# Patient Record
Sex: FEMALE | Race: WHITE | NOT HISPANIC OR LATINO | Employment: UNEMPLOYED | URBAN - METROPOLITAN AREA
[De-identification: names, ages, dates, MRNs, and addresses within clinical notes are randomized per-mention and may not be internally consistent; named-entity substitution may affect disease eponyms.]

---

## 2018-03-01 ENCOUNTER — AMBULATORY CONVERSION ENCOUNTER (OUTPATIENT)
Dept: FAMILY MEDICINE | Facility: CLINIC | Age: 53
End: 2018-03-01

## 2018-03-02 ENCOUNTER — AMBULATORY CONVERSION ENCOUNTER (OUTPATIENT)
Dept: FAMILY MEDICINE | Facility: CLINIC | Age: 53
End: 2018-03-02

## 2018-06-18 RX ORDER — PANTOPRAZOLE SODIUM 40 MG/1
TABLET, DELAYED RELEASE ORAL
Qty: 30 TABLET | Refills: 3 | Status: SHIPPED | OUTPATIENT
Start: 2018-06-18 | End: 2018-10-29 | Stop reason: SDUPTHER

## 2018-06-22 RX ORDER — POTASSIUM CHLORIDE 750 MG/1
10 TABLET, EXTENDED RELEASE ORAL DAILY
Qty: 90 TABLET | Refills: 1 | Status: SHIPPED | OUTPATIENT
Start: 2018-06-22 | End: 2018-12-14 | Stop reason: SDUPTHER

## 2018-06-22 RX ORDER — POTASSIUM CHLORIDE 750 MG/1
10 TABLET, EXTENDED RELEASE ORAL
COMMUNITY
Start: 2017-08-02 | End: 2018-06-22 | Stop reason: SDUPTHER

## 2018-07-09 ENCOUNTER — OFFICE VISIT (OUTPATIENT)
Dept: INTERNAL MEDICINE | Facility: CLINIC | Age: 53
End: 2018-07-09
Payer: MEDICARE

## 2018-07-09 VITALS
SYSTOLIC BLOOD PRESSURE: 100 MMHG | HEART RATE: 76 BPM | HEIGHT: 58 IN | OXYGEN SATURATION: 96 % | RESPIRATION RATE: 18 BRPM | WEIGHT: 111 LBS | TEMPERATURE: 98.3 F | DIASTOLIC BLOOD PRESSURE: 60 MMHG | BODY MASS INDEX: 23.3 KG/M2

## 2018-07-09 DIAGNOSIS — G62.9 NEUROPATHY: ICD-10-CM

## 2018-07-09 DIAGNOSIS — E78.5 HYPERLIPIDEMIA, UNSPECIFIED HYPERLIPIDEMIA TYPE: Primary | ICD-10-CM

## 2018-07-09 DIAGNOSIS — R73.9 HYPERGLYCEMIA: ICD-10-CM

## 2018-07-09 DIAGNOSIS — K21.9 GASTROESOPHAGEAL REFLUX DISEASE, ESOPHAGITIS PRESENCE NOT SPECIFIED: ICD-10-CM

## 2018-07-09 DIAGNOSIS — E55.9 VITAMIN D DEFICIENCY: ICD-10-CM

## 2018-07-09 PROCEDURE — 99214 OFFICE O/P EST MOD 30 MIN: CPT | Performed by: INTERNAL MEDICINE

## 2018-07-09 RX ORDER — GABAPENTIN 300 MG/1
900 CAPSULE ORAL 3 TIMES DAILY
COMMUNITY
Start: 2013-03-25

## 2018-07-09 RX ORDER — FLUTICASONE PROPIONATE 50 MCG
SPRAY, SUSPENSION (ML) NASAL
COMMUNITY
Start: 2017-04-03 | End: 2018-10-10 | Stop reason: SDUPTHER

## 2018-07-09 RX ORDER — VIT C/E/ZN/COPPR/LUTEIN/ZEAXAN 250MG-90MG
1000 CAPSULE ORAL DAILY
COMMUNITY
Start: 2017-12-26

## 2018-07-09 RX ORDER — HYDROCHLOROTHIAZIDE 12.5 MG/1
12.5 CAPSULE ORAL DAILY
COMMUNITY
Start: 2006-12-20 | End: 2019-02-04 | Stop reason: SDUPTHER

## 2018-07-09 RX ORDER — CETIRIZINE HYDROCHLORIDE 10 MG/1
1 TABLET ORAL DAILY
COMMUNITY
End: 2023-10-03

## 2018-07-09 RX ORDER — MULTIVIT WITH MINERALS/HERBS
400 TABLET ORAL DAILY
COMMUNITY
Start: 2018-01-02 | End: 2023-05-12

## 2018-07-09 RX ORDER — VALACYCLOVIR HYDROCHLORIDE 500 MG/1
500 TABLET, FILM COATED ORAL
COMMUNITY
Start: 2018-03-29 | End: 2019-02-04

## 2018-07-09 RX ORDER — MELOXICAM 7.5 MG/1
7.5 TABLET ORAL AS NEEDED
COMMUNITY
Start: 2018-01-02 | End: 2023-10-03

## 2018-07-09 ASSESSMENT — ENCOUNTER SYMPTOMS
CHILLS: 0
ABDOMINAL PAIN: 0
SORE THROAT: 0
BACK PAIN: 1
DIZZINESS: 0
NAUSEA: 0
COUGH: 0
EYE PAIN: 0
FATIGUE: 0
BLOOD IN STOOL: 0
NECK PAIN: 1
FEVER: 0
HEMATURIA: 0
VOMITING: 0
BRUISES/BLEEDS EASILY: 0
PALPITATIONS: 0
NECK STIFFNESS: 1
DYSURIA: 0
SHORTNESS OF BREATH: 0
HEADACHES: 0
NUMBNESS: 0
DIARRHEA: 0
CONFUSION: 0

## 2018-07-09 NOTE — PATIENT INSTRUCTIONS
PLAN      Get blood work in 2 months  Watch the diet and walk regularly  Follow up with Dr sumner  See me in 6  months

## 2018-07-09 NOTE — PROGRESS NOTES
Subjective      Patient ID: Rajwinder Wynne is a 52 y.o. female     HPI     Here for follow up on chronic conditions  Has been stressed as her father passed away from pulmonary fibrosis  She is staying with her mother and is still grieving  Has not been compliant with diet   Did not get blood work done  She saw her gyn dr sumner and will start HRT    Past Medical History:   Diagnosis Date   • Allergic rhinitis    • Cervical myelopathy (CMS/HCC) (HCC)    • Cervical radiculopathy    • Cervicogenic headache    • Cricopharyngeal dysphagia    • Food allergy    • GERD (gastroesophageal reflux disease)    • Lipid disorder    • Nephrolithiasis    • Osteoporosis        Past Surgical History:   Procedure Laterality Date   • ANTERIOR FUSION CERVICAL SPINE      c6-7   • CRICOPHARYNGEAL MYOTOMY     • LUMBAR FUSION      L5-S1   • MYOMECTOMY         Family History   Problem Relation Age of Onset   • Hyperlipidemia Mother    • Hypertension Mother    • Pulmonary fibrosis Father    • Colon cancer Maternal Grandfather    • Breast cancer Mother's Sister    • Ovarian cancer Father's Sister        Social History     Social History   • Marital status:      Spouse name: N/A   • Number of children: N/A   • Years of education: N/A     Occupational History   • Not on file.     Social History Main Topics   • Smoking status: Never Smoker   • Smokeless tobacco: Never Used   • Alcohol use Not on file   • Drug use: Unknown   • Sexual activity: Not on file     Other Topics Concern   • Not on file     Social History Narrative   • No narrative on file       Allergies   Allergen Reactions   • Shellfish Containing Products      Pumpkin seeds   • Adhesive      Steri strips   • Banana      also allergic to almonds, berries   • Erythromycin    • Erythromycin Base      Other reaction(s): GI side effects   • Iodine    • Levonorgestrel-Ethinyl Estrad    • Naprelan    • Naproxen    • Naproxen Sodium      Other reaction(s): lump in throat   • Penicillin  "G    • Penicillin V Potassium Hives   • Pumpkin Seed      Other reaction(s): throat gets tight   • Shellfish Derived Hives       Current Outpatient Prescriptions   Medication Sig Dispense Refill   • b complex vitamins tablet Take 400 mg by mouth daily.     • cetirizine (ZyrTEC) 10 mg tablet Take 1 tablet by mouth daily.     • cholecalciferol, vitamin D3, (VITAMIN D3) 1,000 unit capsule take 1 tablet by oral route  every day     • fluticasone (FLONASE) 50 mcg/actuation nasal spray SPRAY 2 BY NASAL ROUTE EVERY DAY IN EACH NOSTRIL     • gabapentin (NEURONTIN) 300 mg capsule Take 300 mg by mouth 3 (three) times a day.     • hydrochlorothiazide (MICROZIDE) 12.5 mg capsule Take 12.5 mg by mouth daily.     • meloxicam (MOBIC) 7.5 mg tablet Take 7.5 mg by mouth daily.     • mv-min-FA-Qg-Bq-gpgtqze-lutein 0.4-162-18 mg tablet Take 1 tablet by mouth daily.     • pantoprazole (PROTONIX) 40 mg EC tablet TAKE 1 TABLET (40MG) BY ORAL ROUTE EVERY DAY 30 tablet 3   • potassium chloride (KLOR-CON 10) 10 mEq CR tablet Take 1 tablet (10 mEq total) by mouth daily. 90 tablet 1   • valACYclovir (VALTREX) 500 mg tablet Take 500 mg by mouth.       No current facility-administered medications for this visit.        /60   Pulse 76   Temp 36.8 °C (98.3 °F) (Oral)   Resp 18   Ht 1.461 m (4' 9.5\")   Wt 50.3 kg (111 lb)   SpO2 96%   BMI 23.60 kg/m²       The following have been reviewed and updated as appropriate in this visit:  Allergies  Meds  Problems       Review of Systems   Constitutional: Negative for chills, fatigue and fever.   HENT: Negative for ear pain and sore throat.    Eyes: Negative for pain and visual disturbance.   Respiratory: Negative for cough and shortness of breath.    Cardiovascular: Negative for chest pain, palpitations and leg swelling.   Gastrointestinal: Negative for abdominal pain, blood in stool, diarrhea, nausea and vomiting.   Genitourinary: Negative for dysuria and hematuria.   Musculoskeletal: " Positive for back pain, neck pain and neck stiffness.   Skin: Negative for rash.   Allergic/Immunologic: Negative for environmental allergies.   Neurological: Negative for dizziness, numbness and headaches.   Hematological: Does not bruise/bleed easily.   Psychiatric/Behavioral: Negative for confusion.       Objective       Physical Exam   Constitutional: She is oriented to person, place, and time. She appears well-developed and well-nourished.   HENT:   Head: Normocephalic and atraumatic.   Right Ear: External ear normal.   Left Ear: External ear normal.   Nose: Nose normal.   Mouth/Throat: Oropharynx is clear and moist.   Eyes: Conjunctivae and EOM are normal. Pupils are equal, round, and reactive to light.   Neck: Normal range of motion. Neck supple. No thyromegaly present.   Cardiovascular: Normal rate, regular rhythm, normal heart sounds and intact distal pulses.    Pulmonary/Chest: Effort normal and breath sounds normal. No respiratory distress. She exhibits no tenderness.   Abdominal: Soft. Bowel sounds are normal. There is no tenderness. There is no guarding.   Musculoskeletal: Normal range of motion. She exhibits no tenderness.   Tenderness lumbar muscles  Tenderness and tightness cervical muscles   Lymphadenopathy:     She has no cervical adenopathy.   Neurological: She is alert and oriented to person, place, and time. No cranial nerve deficit or sensory deficit.   Skin: Skin is warm and dry. No rash noted.   Psychiatric: She has a normal mood and affect. Her behavior is normal.   Nursing note and vitals reviewed.      Assessment/Plan     Hyperlipidemia  Not controlled  Check fasting labs  Discussed diet changes  May need to be on statin    Hyperglycemia  Check fasting blood sugar and A1C    Neuropathy (CMS/HCC)  Persisting sx  She is on gabapentin 300 mg three times daily  Follow up with Dr Arnett    GERD (gastroesophageal reflux disease)  Fair control  Discussed diet and lifestyle changes    Vitamin D  deficiency  Check labs  Cont vitamin D3,1000 units daily        Maya Tesfaye MD  7/9/2018  9:36 PM

## 2018-07-10 ENCOUNTER — APPOINTMENT (OUTPATIENT)
Dept: LAB | Facility: HOSPITAL | Age: 53
End: 2018-07-10
Attending: PODIATRIST
Payer: MEDICARE

## 2018-07-10 ENCOUNTER — TRANSCRIBE ORDERS (OUTPATIENT)
Dept: LAB | Facility: HOSPITAL | Age: 53
End: 2018-07-10

## 2018-07-10 DIAGNOSIS — B35.1 TINEA UNGUIUM: ICD-10-CM

## 2018-07-10 LAB
ALT SERPL-CCNC: 17 IU/L (ref 11–54)
AST SERPL-CCNC: 21 IU/L (ref 15–41)
BASOPHILS # BLD: 0.07 K/UL (ref 0.01–0.1)
BASOPHILS NFR BLD: 0.9 %
DIFFERENTIAL METHOD BLD: ABNORMAL
EOSINOPHIL # BLD: 0.41 K/UL (ref 0.04–0.36)
EOSINOPHIL NFR BLD: 5.1 %
ERYTHROCYTE [DISTWIDTH] IN BLOOD BY AUTOMATED COUNT: 12.7 % (ref 11.7–14.4)
HCT VFR BLDCO AUTO: 38.7 % (ref 35–45)
HGB BLD-MCNC: 13.4 G/DL (ref 11.8–15.7)
IMM GRANULOCYTES # BLD AUTO: 0.01 K/UL (ref 0–0.08)
IMM GRANULOCYTES NFR BLD AUTO: 0.1 %
LYMPHOCYTES # BLD: 2.7 K/UL (ref 1.2–3.5)
LYMPHOCYTES NFR BLD: 33.4 %
MCH RBC QN AUTO: 33.7 PG (ref 28–33.2)
MCHC RBC AUTO-ENTMCNC: 34.6 G/DL (ref 32.2–35.5)
MCV RBC AUTO: 97.2 FL (ref 83–98)
MONOCYTES # BLD: 0.64 K/UL (ref 0.28–0.8)
MONOCYTES NFR BLD: 7.9 %
NEUTROPHILS # BLD: 4.25 K/UL (ref 1.7–7)
NEUTS SEG NFR BLD: 52.6 %
NRBC BLD-RTO: 0 %
PDW BLD AUTO: 12 FL (ref 9.4–12.3)
PLATELET # BLD AUTO: 358 K/UL (ref 150–369)
RBC # BLD AUTO: 3.98 M/UL (ref 3.93–5.22)
WBC # BLD AUTO: 8.08 K/UL (ref 3.8–10.5)

## 2018-07-10 PROCEDURE — 85025 COMPLETE CBC W/AUTO DIFF WBC: CPT

## 2018-07-10 PROCEDURE — 84450 TRANSFERASE (AST) (SGOT): CPT

## 2018-07-10 PROCEDURE — 36415 COLL VENOUS BLD VENIPUNCTURE: CPT

## 2018-07-10 PROCEDURE — 84460 ALANINE AMINO (ALT) (SGPT): CPT

## 2018-09-17 ENCOUNTER — DOCUMENTATION (OUTPATIENT)
Dept: INTERNAL MEDICINE | Facility: CLINIC | Age: 53
End: 2018-09-17

## 2018-09-17 PROBLEM — M81.0 AGE-RELATED OSTEOPOROSIS WITHOUT CURRENT PATHOLOGICAL FRACTURE: Status: ACTIVE | Noted: 2018-09-17

## 2018-09-17 NOTE — PROGRESS NOTES
Coding Clarification (ML) - [facility]  Note       DATE: 2018    RE: Rajwinder Wynne  : 1965      Under the direction of Maya Tesfaye MD, the following adjustments were made to this patients Problem List:      New Code: M81.0            Code Description: Age-related osteoporosis without current pathological fracture   Clarification Type EMR System Encounter Type Date of Service Provider   New Code [facility] Office Visit 2017 Maya Tesfaye   Rationale: The documentation from office visit 2017 states that the patient has age related osteoporosis without current pathological fracture (code M81.0).

## 2018-10-10 RX ORDER — FLUTICASONE PROPIONATE 50 MCG
2 SPRAY, SUSPENSION (ML) NASAL DAILY
Qty: 1 BOTTLE | Refills: 2 | Status: SHIPPED | OUTPATIENT
Start: 2018-10-10 | End: 2019-01-23 | Stop reason: SDUPTHER

## 2018-10-22 ENCOUNTER — TELEPHONE (OUTPATIENT)
Dept: INTERNAL MEDICINE | Facility: CLINIC | Age: 53
End: 2018-10-22

## 2018-10-22 DIAGNOSIS — R73.9 HYPERGLYCEMIA: ICD-10-CM

## 2018-10-22 DIAGNOSIS — E78.5 HYPERLIPIDEMIA, UNSPECIFIED HYPERLIPIDEMIA TYPE: Primary | ICD-10-CM

## 2018-10-22 DIAGNOSIS — E55.9 VITAMIN D DEFICIENCY: ICD-10-CM

## 2018-10-22 NOTE — TELEPHONE ENCOUNTER
PT says she was given some lab orders in July that she went to get done today, but the orders had  on 10.09.18.    PT requesting new orders faxed to number below.  She has a MAW scheduled w/ you on 18      CB# [phone]  [phone]

## 2018-10-23 ENCOUNTER — OFFICE VISIT (OUTPATIENT)
Dept: INTERNAL MEDICINE | Facility: CLINIC | Age: 53
End: 2018-10-23
Payer: MEDICARE

## 2018-10-23 VITALS
TEMPERATURE: 98.3 F | SYSTOLIC BLOOD PRESSURE: 100 MMHG | OXYGEN SATURATION: 98 % | HEIGHT: 58 IN | DIASTOLIC BLOOD PRESSURE: 60 MMHG | BODY MASS INDEX: 23.47 KG/M2 | WEIGHT: 111.8 LBS | HEART RATE: 78 BPM | RESPIRATION RATE: 18 BRPM

## 2018-10-23 DIAGNOSIS — E78.5 HYPERLIPIDEMIA, UNSPECIFIED HYPERLIPIDEMIA TYPE: ICD-10-CM

## 2018-10-23 DIAGNOSIS — J02.9 SORE THROAT: Primary | ICD-10-CM

## 2018-10-23 PROBLEM — M81.0 AGE-RELATED OSTEOPOROSIS WITHOUT CURRENT PATHOLOGICAL FRACTURE: Status: RESOLVED | Noted: 2018-09-17 | Resolved: 2018-10-23

## 2018-10-23 PROBLEM — E55.9 VITAMIN D DEFICIENCY: Status: RESOLVED | Noted: 2018-07-09 | Resolved: 2018-10-23

## 2018-10-23 PROBLEM — R73.9 HYPERGLYCEMIA: Status: RESOLVED | Noted: 2018-07-09 | Resolved: 2018-10-23

## 2018-10-23 PROBLEM — G62.9 NEUROPATHY: Status: RESOLVED | Noted: 2018-07-09 | Resolved: 2018-10-23

## 2018-10-23 LAB — S PYO AG THROAT QL: NEGATIVE

## 2018-10-23 PROCEDURE — 87880 STREP A ASSAY W/OPTIC: CPT | Mod: QW | Performed by: INTERNAL MEDICINE

## 2018-10-23 PROCEDURE — 99213 OFFICE O/P EST LOW 20 MIN: CPT | Performed by: INTERNAL MEDICINE

## 2018-10-23 RX ORDER — PROGESTERONE 100 MG/1
100 CAPSULE ORAL
Refills: 3 | COMMUNITY
Start: 2018-10-11 | End: 2019-06-18

## 2018-10-23 RX ORDER — ESTRADIOL 0.04 MG/D
0.04 FILM, EXTENDED RELEASE TRANSDERMAL
Refills: 3 | COMMUNITY
Start: 2018-10-07 | End: 2019-06-18

## 2018-10-23 RX ORDER — AZITHROMYCIN 250 MG/1
250 TABLET, FILM COATED ORAL DAILY
Qty: 6 TABLET | Refills: 0 | Status: SHIPPED | OUTPATIENT
Start: 2018-10-23 | End: 2018-12-28

## 2018-10-23 ASSESSMENT — ENCOUNTER SYMPTOMS
COUGH: 0
BRUISES/BLEEDS EASILY: 0
BLOOD IN STOOL: 0
FEVER: 0
DYSURIA: 0
HEMATURIA: 0
EYE PAIN: 0
BACK PAIN: 0
VOMITING: 0
CHILLS: 0
NUMBNESS: 0
SWOLLEN GLANDS: 1
DIARRHEA: 0
DIZZINESS: 0
CONFUSION: 0
PALPITATIONS: 0
SORE THROAT: 1
ABDOMINAL PAIN: 0
FATIGUE: 0
NAUSEA: 0
SHORTNESS OF BREATH: 0
HEADACHES: 1

## 2018-10-23 NOTE — PATIENT INSTRUCTIONS
PLAN    Start flonase nasal spray daily for 2 weeks  'take tylenol as needed  Salt water gargles   Take zpak if not better by next week  See me in dec

## 2018-10-23 NOTE — PROGRESS NOTES
Subjective      Patient ID: Rajwinder Wynne is a 52 y.o. female     Sore Throat    This is a new problem. The current episode started in the past 7 days. The problem has been unchanged. There has been no fever. The pain is at a severity of 5/10. Associated symptoms include headaches and swollen glands. Pertinent negatives include no abdominal pain, coughing, diarrhea, ear pain, shortness of breath or vomiting. She has tried acetaminophen for the symptoms.          Here with sore throat for a week and not getting better      Past Medical History:   Diagnosis Date   • Allergic rhinitis    • Cervical myelopathy (CMS/HCC) (HCC)    • Cervical radiculopathy    • Cervicogenic headache    • Cricopharyngeal dysphagia    • Food allergy    • GERD (gastroesophageal reflux disease)    • Lipid disorder    • Nephrolithiasis    • Osteoporosis        Past Surgical History:   Procedure Laterality Date   • ANTERIOR FUSION CERVICAL SPINE      c6-7   • CRICOPHARYNGEAL MYOTOMY     • LUMBAR FUSION      L5-S1   • MYOMECTOMY         Family History   Problem Relation Age of Onset   • Hyperlipidemia Mother    • Hypertension Mother    • Pulmonary fibrosis Father    • Colon cancer Maternal Grandfather    • Breast cancer Mother's Sister    • Ovarian cancer Father's Sister        Social History     Social History   • Marital status:      Spouse name: N/A   • Number of children: N/A   • Years of education: N/A     Occupational History   • Not on file.     Social History Main Topics   • Smoking status: Never Smoker   • Smokeless tobacco: Never Used   • Alcohol use Not on file   • Drug use: Unknown   • Sexual activity: Not on file     Other Topics Concern   • Not on file     Social History Narrative   • No narrative on file       Allergies   Allergen Reactions   • Shellfish Containing Products Anaphylaxis     Pumpkin seeds   • Adhesive      Steri strips   • [place] Other (see comments)   • Banana Other (see comments)     also allergic to  almonds, berries   • Berry Flavor Other (see comments)   • Erythromycin    • Erythromycin Base Other (see comments)     GI Upset  Other reaction(s): GI side effects   • Iodine Other (see comments)     shell fish allergy   • Latex Other (see comments)     patient not sure   • Levonorgestrel-Ethinyl Estrad    • Naprelan    • Naproxen Other (see comments)     dysphagia throat burning   • Naproxen Sodium      Other reaction(s): lump in throat   • Penicillin G    • Penicillin V Potassium Hives   • Penicillins Hives   • Pineapple Other (see comments)   • Pumpkin Seed      Other reaction(s): throat gets tight   • Shellfish Derived Hives       Current Outpatient Prescriptions   Medication Sig Dispense Refill   • b complex vitamins tablet Take 400 mg by mouth daily.     • cetirizine (ZyrTEC) 10 mg tablet Take 1 tablet by mouth daily.     • cholecalciferol, vitamin D3, (VITAMIN D3) 1,000 unit capsule take 1 tablet by oral route  every day     • estradiol (VIVELLE-DOT) 0.0375 mg/24 hr 0.0375 patches.  3   • fluticasone (FLONASE) 50 mcg/actuation nasal spray Administer 2 sprays into each nostril daily. 1 Bottle 2   • folic acid/multivit,iron, (CENTRUM ORAL) Take by mouth.     • gabapentin (NEURONTIN) 300 mg capsule Take 300 mg by mouth 3 (three) times a day.     • hydrochlorothiazide (MICROZIDE) 12.5 mg capsule Take 12.5 mg by mouth daily.     • meloxicam (MOBIC) 7.5 mg tablet Take 7.5 mg by mouth daily.     • pantoprazole (PROTONIX) 40 mg EC tablet TAKE 1 TABLET (40MG) BY ORAL ROUTE EVERY DAY 30 tablet 3   • potassium chloride (KLOR-CON 10) 10 mEq CR tablet Take 1 tablet (10 mEq total) by mouth daily. 90 tablet 1   • progesterone (PROMETRIUM) 100 mg capsule Take 100 mg by mouth once daily.  3   • azithromycin (ZITHROMAX) 250 mg tablet Take 1 tablet (250 mg total) by mouth daily for 5 days. Take 2 tablets the first day, then 1 tablet daily for 4 days. 6 tablet 0   • mv-min-FA-Hj-Rl-ralfifn-lutein 0.4-162-18 mg tablet Take  "1 tablet by mouth daily.     • valACYclovir (VALTREX) 500 mg tablet Take 500 mg by mouth.       No current facility-administered medications for this visit.        /60   Pulse 78   Temp 36.8 °C (98.3 °F) (Oral)   Resp 18   Ht 1.461 m (4' 9.5\")   Wt 50.7 kg (111 lb 12.8 oz)   SpO2 98%   BMI 23.77 kg/m²       The following have been reviewed and updated as appropriate in this visit:       Review of Systems   Constitutional: Negative for chills, fatigue and fever.   HENT: Positive for sore throat. Negative for ear pain.    Eyes: Negative for pain and visual disturbance.   Respiratory: Negative for cough and shortness of breath.    Cardiovascular: Negative for chest pain, palpitations and leg swelling.   Gastrointestinal: Negative for abdominal pain, blood in stool, diarrhea, nausea and vomiting.   Genitourinary: Negative for dysuria and hematuria.   Musculoskeletal: Negative for back pain.   Skin: Negative for rash.   Allergic/Immunologic: Negative for environmental allergies.   Neurological: Positive for headaches. Negative for dizziness and numbness.   Hematological: Does not bruise/bleed easily.   Psychiatric/Behavioral: Negative for confusion.       Objective       Physical Exam   Constitutional: She is oriented to person, place, and time. She appears well-developed and well-nourished.   HENT:   Head: Normocephalic and atraumatic.   Right Ear: External ear normal.   Left Ear: External ear normal.   Nose: Nose normal.   Mouth/Throat: Oropharynx is clear and moist.   Eyes: Conjunctivae and EOM are normal. Pupils are equal, round, and reactive to light.   Neck: Normal range of motion. Neck supple. No thyromegaly present.   Mildly swollen and tender submandibular glands   Cardiovascular: Normal rate, regular rhythm, normal heart sounds and intact distal pulses.    Pulmonary/Chest: Effort normal and breath sounds normal. No respiratory distress. She exhibits no tenderness.   Abdominal: Soft. Bowel sounds " are normal. There is no tenderness. There is no guarding.   Musculoskeletal: Normal range of motion. She exhibits no tenderness.   Lymphadenopathy:     She has no cervical adenopathy.   Neurological: She is alert and oriented to person, place, and time. No cranial nerve deficit or sensory deficit.   Skin: Skin is warm and dry. No rash noted.   Psychiatric: She has a normal mood and affect. Her behavior is normal.   Nursing note and vitals reviewed.      Assessment/Plan     Sore throat  Persisting symptoms  Rapid strep is negative  Discussed with her that it is likely a viral illness  She will start Advil 400 mg twice daily with food for 2 days  Salt water gargles 3 times daily  Rest and drink lots of fluids  She was given a prescription for Z-Bereket to take next week if she is not better    Hyperlipidemia  Elevated lipids in the past  Labs printed for her to get blood work prior to next appointment        Maya Tesfaye MD  10/23/2018  10:27 PM

## 2018-10-24 NOTE — ASSESSMENT & PLAN NOTE
Persisting symptoms  Rapid strep is negative  Discussed with her that it is likely a viral illness  She will start Advil 400 mg twice daily with food for 2 days  Salt water gargles 3 times daily  Rest and drink lots of fluids  She was given a prescription for Z-Bereket to take next week if she is not better

## 2018-10-26 ENCOUNTER — APPOINTMENT (OUTPATIENT)
Dept: LAB | Facility: HOSPITAL | Age: 53
End: 2018-10-26
Attending: INTERNAL MEDICINE
Payer: MEDICARE

## 2018-10-26 DIAGNOSIS — E55.9 VITAMIN D DEFICIENCY: ICD-10-CM

## 2018-10-26 DIAGNOSIS — R73.9 HYPERGLYCEMIA: ICD-10-CM

## 2018-10-26 DIAGNOSIS — E78.5 HYPERLIPIDEMIA, UNSPECIFIED HYPERLIPIDEMIA TYPE: ICD-10-CM

## 2018-10-26 LAB
25(OH)D3 SERPL-MCNC: 48 NG/ML (ref 30–100)
ALBUMIN SERPL-MCNC: 4.1 G/DL (ref 3.4–5)
ALP SERPL-CCNC: 63 IU/L (ref 35–126)
ALT SERPL-CCNC: 15 IU/L (ref 11–54)
ANION GAP SERPL CALC-SCNC: 8 MEQ/L (ref 3–15)
AST SERPL-CCNC: 21 IU/L (ref 15–41)
BASOPHILS # BLD: 0.09 K/UL (ref 0.01–0.1)
BASOPHILS NFR BLD: 1.4 %
BILIRUB SERPL-MCNC: 0.6 MG/DL (ref 0.3–1.2)
BUN SERPL-MCNC: 14 MG/DL (ref 8–20)
CALCIUM SERPL-MCNC: 9.7 MG/DL (ref 8.9–10.3)
CHLORIDE SERPL-SCNC: 103 MEQ/L (ref 98–109)
CHOLEST SERPL-MCNC: 223 MG/DL
CO2 SERPL-SCNC: 29 MEQ/L (ref 22–32)
CREAT SERPL-MCNC: 0.7 MG/DL (ref 0.6–1.1)
CRP SERPL-MCNC: <=6 MG/L
DIFFERENTIAL METHOD BLD: ABNORMAL
EOSINOPHIL # BLD: 0.39 K/UL (ref 0.04–0.36)
EOSINOPHIL NFR BLD: 6.1 %
ERYTHROCYTE [DISTWIDTH] IN BLOOD BY AUTOMATED COUNT: 13.2 % (ref 11.7–14.4)
EST. AVERAGE GLUCOSE BLD GHB EST-MCNC: 100 MG/DL
GFR SERPL CREATININE-BSD FRML MDRD: >60 ML/MIN/1.73M*2
GLUCOSE SERPL-MCNC: 86 MG/DL (ref 70–99)
HBA1C MFR BLD HPLC: 5.1 %
HCT VFR BLDCO AUTO: 42.5 % (ref 35–45)
HDLC SERPL-MCNC: 55 MG/DL
HDLC SERPL: 4.1 {RATIO}
HGB BLD-MCNC: 14 G/DL (ref 11.8–15.7)
IMM GRANULOCYTES # BLD AUTO: 0.01 K/UL (ref 0–0.08)
IMM GRANULOCYTES NFR BLD AUTO: 0.2 %
LDLC SERPL CALC-MCNC: 137 MG/DL
LYMPHOCYTES # BLD: 2.96 K/UL (ref 1.2–3.5)
LYMPHOCYTES NFR BLD: 46.2 %
MCH RBC QN AUTO: 33.2 PG (ref 28–33.2)
MCHC RBC AUTO-ENTMCNC: 32.9 G/DL (ref 32.2–35.5)
MCV RBC AUTO: 100.7 FL (ref 83–98)
MONOCYTES # BLD: 0.47 K/UL (ref 0.28–0.8)
MONOCYTES NFR BLD: 7.3 %
NEUTROPHILS # BLD: 2.49 K/UL (ref 1.7–7)
NEUTS SEG NFR BLD: 38.8 %
NONHDLC SERPL-MCNC: 168 MG/DL
NRBC BLD-RTO: 0 %
PDW BLD AUTO: 12.2 FL (ref 9.4–12.3)
PLATELET # BLD AUTO: 356 K/UL (ref 150–369)
POTASSIUM SERPL-SCNC: 4.2 MEQ/L (ref 3.6–5.1)
PROT SERPL-MCNC: 6.4 G/DL (ref 6–8.2)
RBC # BLD AUTO: 4.22 M/UL (ref 3.93–5.22)
SODIUM SERPL-SCNC: 140 MEQ/L (ref 136–144)
TRIGL SERPL-MCNC: 153 MG/DL (ref 30–149)
TSH SERPL DL<=0.05 MIU/L-ACNC: 3.41 MIU/L (ref 0.34–5.6)
WBC # BLD AUTO: 6.41 K/UL (ref 3.8–10.5)

## 2018-10-26 PROCEDURE — 86140 C-REACTIVE PROTEIN: CPT

## 2018-10-26 PROCEDURE — 85025 COMPLETE CBC W/AUTO DIFF WBC: CPT

## 2018-10-26 PROCEDURE — 82306 VITAMIN D 25 HYDROXY: CPT

## 2018-10-26 PROCEDURE — 80053 COMPREHEN METABOLIC PANEL: CPT

## 2018-10-26 PROCEDURE — 84443 ASSAY THYROID STIM HORMONE: CPT

## 2018-10-26 PROCEDURE — 80061 LIPID PANEL: CPT

## 2018-10-26 PROCEDURE — 83036 HEMOGLOBIN GLYCOSYLATED A1C: CPT

## 2018-10-26 PROCEDURE — 36415 COLL VENOUS BLD VENIPUNCTURE: CPT

## 2018-10-29 RX ORDER — PANTOPRAZOLE SODIUM 40 MG/1
TABLET, DELAYED RELEASE ORAL
Qty: 30 TABLET | Refills: 3 | Status: SHIPPED | OUTPATIENT
Start: 2018-10-29 | End: 2019-02-18 | Stop reason: SDUPTHER

## 2018-10-30 ENCOUNTER — CLINICAL SUPPORT (OUTPATIENT)
Dept: INTERNAL MEDICINE | Facility: CLINIC | Age: 53
End: 2018-10-30
Payer: MEDICARE

## 2018-10-30 ENCOUNTER — TELEPHONE (OUTPATIENT)
Dept: INTERNAL MEDICINE | Facility: CLINIC | Age: 53
End: 2018-10-30

## 2018-10-30 DIAGNOSIS — Z23 NEED FOR INFLUENZA VACCINATION: Primary | ICD-10-CM

## 2018-10-30 PROCEDURE — G0008 ADMIN INFLUENZA VIRUS VAC: HCPCS | Performed by: INTERNAL MEDICINE

## 2018-10-30 PROCEDURE — 90686 IIV4 VACC NO PRSV 0.5 ML IM: CPT | Performed by: INTERNAL MEDICINE

## 2018-10-30 NOTE — TELEPHONE ENCOUNTER
spoke with the patient and reviewed the test results  Advised her to watch her diet and she will see me in December  She requested a copy be mailed to her

## 2018-10-30 NOTE — LETTER
October 30, 2018     Rajwinder OCTAVIO Sherrell  [address]   [id number]  [place] [id number]      Dear Ms. Wynne:    Below are the results from your recent visit:    Resulted Orders   Comprehensive metabolic panel   Result Value Ref Range    Sodium 140 136 - 144 mEQ/L    Potassium 4.2 3.6 - 5.1 mEQ/L    Chloride 103 98 - 109 mEQ/L    CO2 29 22 - 32 mEQ/L    BUN 14 8 - 20 mg/dL    Creatinine 0.7 0.6 - 1.1 mg/dL    Glucose 86 70 - 99 mg/dL    Calcium 9.7 8.9 - 10.3 mg/dL    AST (SGOT) 21 15 - 41 IU/L    ALT (SGPT) 15 11 - 54 IU/L    Alkaline Phosphatase 63 35 - 126 IU/L    Total Protein 6.4 6.0 - 8.2 g/dL    Albumin 4.1 3.4 - 5.0 g/dL    Bilirubin, Total 0.6 0.3 - 1.2 mg/dL    eGFR >60.0 >=60.0 mL/min/1.73m*2    Anion Gap 8 3 - 15 mEQ/L   Hemoglobin A1c   Result Value Ref Range    Hemoglobin A1C 5.1 <5.7 %    Estimated Ave Glucose 100 mg/dL      Comment:        Estimate of average glucose concentration continuously over 24 hours for previous 2 to 3 months(Per ADA Recommendation).   Lipid panel   Result Value Ref Range    Triglycerides 153 (H) 30 - 149 mg/dL    Cholesterol 223 (H) <=200 mg/dL    HDL 55 >=55 mg/dL      Comment:      2001 NCEP GUIDELINES  <40 mg/dL Low  >=60 mg/dL High    LDL Calculated 137 (H) <=100 mg/dL      Comment:      ATP III GUIDELINES  Optimal: <100 mg/dL  Near/Above Optimal: 100-129 mg/dL  Borderline High: 130-159 mg/dL  High: 160-189 mg/dL  Very High: >190 mg/dL      Non-HDL, Calculated 168 mg/dL    RISK 4.1 <=5.0      Comment:      RISK FEMALE ([place] STUDY DATA)  1/2 Average 3.3  Average 4.4  2X Average 7.1  3X Average 11.0   TSH   Result Value Ref Range    TSH 3.41 0.34 - 5.60 mIU/L   Vitamin D 25 hydroxy   Result Value Ref Range    Vit D, 25-Hydroxy 48 30 - 100 ng/mL      Comment:        INTERPRETATION  VITAMIN D STATUS   25 OH VITAMIN D    Deficiency          <20 ng/mL    Insufficiency     20-29 ng/mL    Sufficiency       ng/mL    Toxicity           >100 ng/mL  This test measures a  total of both 25 OH D2 and 25 OH D3.   C-reactive protein   Result Value Ref Range    CRP <=6.00 <=7.48 mg/L   CBC   Result Value Ref Range    WBC 6.41 3.80 - 10.50 K/uL    RBC 4.22 3.93 - 5.22 M/uL    Hemoglobin 14.0 11.8 - 15.7 g/dL    Hematocrit 42.5 35.0 - 45.0 %    .7 (H) 83.0 - 98.0 fL    MCH 33.2 28.0 - 33.2 pg    MCHC 32.9 32.2 - 35.5 g/dL    RDW 13.2 11.7 - 14.4 %    Platelets 356 150 - 369 K/uL    MPV 12.2 9.4 - 12.3 fL   Diff Count   Result Value Ref Range    Differential Type Auto     nRBC 0.0 <=0.0 %    Immature Granulocytes 0.2 %    Neutrophils 38.8 %    Lymphocytes 46.2 %    Monocytes 7.3 %    Eosinophils 6.1 %    Basophils 1.4 %    Immature Granulocytes, Absolute 0.01 0.00 - 0.08 K/uL    Neutrophils, Absolute 2.49 1.70 - 7.00 K/uL    Lymphocytes, Absolute 2.96 1.20 - 3.50 K/uL    Monocytes, Absolute 0.47 0.28 - 0.80 K/uL    Eosinophils, Absolute 0.39 (H) 0.04 - 0.36 K/uL    Basophils, Absolute 0.09 0.01 - 0.10 K/uL     As discussed this is a copy of the test results  Will discuss more at your  appointment in Decemberl.         Sincerely,        Maya Tesfaye MD

## 2018-11-01 ENCOUNTER — TRANSCRIBE ORDERS (OUTPATIENT)
Dept: SCHEDULING | Age: 53
End: 2018-11-01

## 2018-11-01 DIAGNOSIS — D25.9 LEIOMYOMA OF UTERUS: Primary | ICD-10-CM

## 2018-11-15 ENCOUNTER — HOSPITAL ENCOUNTER (OUTPATIENT)
Dept: RADIOLOGY | Facility: HOSPITAL | Age: 53
Discharge: HOME | End: 2018-11-15
Attending: OBSTETRICS & GYNECOLOGY
Payer: MEDICARE

## 2018-11-15 DIAGNOSIS — D25.9 LEIOMYOMA OF UTERUS: ICD-10-CM

## 2018-11-15 PROCEDURE — 76856 US EXAM PELVIC COMPLETE: CPT

## 2018-12-14 RX ORDER — POTASSIUM CHLORIDE 750 MG/1
TABLET, EXTENDED RELEASE ORAL
Qty: 90 TABLET | Refills: 1 | Status: SHIPPED | OUTPATIENT
Start: 2018-12-14 | End: 2019-06-28 | Stop reason: SDUPTHER

## 2018-12-14 RX ORDER — HYDROCHLOROTHIAZIDE 25 MG/1
12.5 TABLET ORAL DAILY
Qty: 45 TABLET | Refills: 1 | Status: SHIPPED | OUTPATIENT
Start: 2018-12-14 | End: 2019-04-25 | Stop reason: SDUPTHER

## 2018-12-14 NOTE — TELEPHONE ENCOUNTER
Refill request    Hydrochlorothiazide 12.5 mg, oral, daily, 3 mo supply    PT wanted to note that she normally gets 25 mg tablets and cuts them in half (saves $$ on insurance).    [id number]

## 2018-12-28 ENCOUNTER — TELEPHONE (OUTPATIENT)
Dept: CARDIOLOGY | Facility: CLINIC | Age: 53
End: 2018-12-28

## 2018-12-28 ENCOUNTER — OFFICE VISIT (OUTPATIENT)
Dept: INTERNAL MEDICINE | Facility: CLINIC | Age: 53
End: 2018-12-28
Attending: INTERNAL MEDICINE
Payer: MEDICARE

## 2018-12-28 VITALS
RESPIRATION RATE: 18 BRPM | HEIGHT: 58 IN | HEART RATE: 75 BPM | TEMPERATURE: 98.6 F | OXYGEN SATURATION: 98 % | BODY MASS INDEX: 24.05 KG/M2 | SYSTOLIC BLOOD PRESSURE: 100 MMHG | WEIGHT: 114.6 LBS | DIASTOLIC BLOOD PRESSURE: 60 MMHG

## 2018-12-28 DIAGNOSIS — J30.9 ALLERGIC RHINITIS, UNSPECIFIED SEASONALITY, UNSPECIFIED TRIGGER: ICD-10-CM

## 2018-12-28 DIAGNOSIS — E78.5 HYPERLIPIDEMIA, UNSPECIFIED HYPERLIPIDEMIA TYPE: ICD-10-CM

## 2018-12-28 DIAGNOSIS — M54.12 CERVICAL RADICULOPATHY: ICD-10-CM

## 2018-12-28 DIAGNOSIS — Z00.00 MEDICARE ANNUAL WELLNESS VISIT, SUBSEQUENT: Primary | ICD-10-CM

## 2018-12-28 DIAGNOSIS — M81.0 OSTEOPOROSIS, UNSPECIFIED OSTEOPOROSIS TYPE, UNSPECIFIED PATHOLOGICAL FRACTURE PRESENCE: ICD-10-CM

## 2018-12-28 PROBLEM — J02.9 SORE THROAT: Status: RESOLVED | Noted: 2018-10-23 | Resolved: 2018-12-28

## 2018-12-28 PROCEDURE — G0439 PPPS, SUBSEQ VISIT: HCPCS | Performed by: INTERNAL MEDICINE

## 2018-12-28 ASSESSMENT — MINI COG
COMPLETED: YES
TOTAL SCORE: 5

## 2018-12-28 ASSESSMENT — VISUAL ACUITY
OD_CC: 20/25
OS_CC: 20/25

## 2018-12-28 NOTE — ASSESSMENT & PLAN NOTE
Reviewed health maintenance measures  She is current on GYN exam and mammograms colonoscopy was in 2016 and will be due in 2021 last DEXA scan was in 2016 and she will schedule a DEXA scan and follow-up with Dr. Crow  Eye and  dental exams advised  Safety precautions reviewed

## 2018-12-28 NOTE — PATIENT INSTRUCTIONS
PLAN      Start fluticasone nasal spray daily for 2 weeks  Call Dr Gaona for the calcium score  Schedule DEXA scan  Follow up with Dr Crow.  Cont the same meds  See me in 6 months                             Women's Personalized Prevention Plan Services (PPPS)      Preventive Services Checklist (Assumes Average Risk Unless Otherwise Noted)  Preventive Service Target Population and Frequency Last Done Date Due   Abd Aortic Aneurysm Screening Family history of AAA (covered once)     Alcohol Misuse Screening As necessary for those at risk (covered annually)     Breast Cancer Screening Mammogram every 1-2yrs 50-71yo; every 1-2yrs 35-50yo if patient desires after weighing potential harms and benefits (covered once 35-38yo, annually >=41yo)     Cholesterol Screening Both risk factors: 1) >=21yo and 2)  increased risk coronary artery disease (covered every 5 years)     Colorectal Cancer Screening >=51yo: Colonoscopy every 10 years, FIT annually or Cologuard every 3 years (up to 84yo for Cologuard)     Diabetes Screening Any 1 risk factor: hypertension, dyslipidemia, obesity, high glucose; or Any 2 risk factors: >=66 yo, overweight, family history diabetes, history gestational diabetes or delivery of infant >9lbs (covered every 6 months)     Glaucoma Screening Any 1 risk factor: 1) diabetes, 2) family history glaucoma, 3)  >=51yo, 4)  American >=64yo (covered annually)     Hepatitis C Screening Any 1 risk factor: 1) born between 4654-3199, 2) blood transfusion before 1992, 3) current or past injection drug use (covered once for average risk, annually for high risk)     Lung Cancer Screening Low-dose chest CT if all 3 risk factors: 1) 55-76yo, 2) smoker or quit within last 15y, 3) >=30 pack years (covered annually)     Osteoporosis Screening >=64yo (covered every 24 months)  <64yo if any 1 risk factor: 1) estrogen deficient and at risk for osteoporosis; 2) established osteoporosis on treatment; 3)  "x-rays show possible osteoporosis, osteopenia or vertebral fractures; 4) on steroid or planning to start; 5) primary hyperparathyroidism (covered as medically necessary)     Sexually Transmitted Diseases (STDs) As necessary chlamydia, gonorrhea, syphilis, hepatitis B (covered annually if not pregnant, more often in pregnancy)  HIV if any 1 risk factor present: 1) <14yo or >64yo and at increased risk, 2) 15-64yo and ask for it, or 3) pregnant (covered annually if not pregnant, up to 3x in pregnancy)     Vaccine: Hepatitis B As necessary if medium or high risk (series covered once)     Vaccine: Influenza All patients without contraindications (covered annually.)     Vaccine: Pneumococcal Pneumovax + Prevnar (both covered, >=11 months apart)     Vaccine: Shingrix (Shingles) >= 49yo without contraindications             (2 doses, 2 months apart)     Other Services               Women's Personalized Prevention Plan Services (PPPS)              Prints to [facility]                                         Health Risk Factors and Interventions  \"x\" Indicates risk is associated with this patient Risk Factor Recommended Interventions     Obesity     Hypertension     Diabetes     Fall Risk     Tobacco Use     Other:          "

## 2018-12-28 NOTE — ASSESSMENT & PLAN NOTE
Fair control  Schedule DEXA scan for follow-up  Also make an appointment to see Dr. Crow  Continue the calcium and vitamin D3 as discussed and try to do weightbearing exercises few times a week

## 2018-12-28 NOTE — ASSESSMENT & PLAN NOTE
History of elevated lipids  Discussed in detail that she will schedule calcium score for risk stratification  Discussed diet and lifestyle changes also discussed that she may need to start a statin  She will call Dr. Gaona and get a prescription for calcium score

## 2018-12-28 NOTE — TELEPHONE ENCOUNTER
Pt called, was delayed with getting calcium score this year and would like a new order. She would like a new order placed for Michaela

## 2018-12-28 NOTE — PROGRESS NOTES
Subjective     Rajwinder Wynne is a 53 y.o. female who presents for a subsequent annual wellness visit.     Here for a wellness exam and review of chronic conditions  She is current on GYN exam with Dr. Boucher and mammograms with Dr. Alexander  She had a pelvic ultrasound recently which showed the fibroids  She saw Dr. Arnett in August for the chronic neck pain and neuropathy symptoms  She has had sinus and nasal congestion in the last few days  She saw Dr. Dominic Gaona in January and was advised to get a calcium score but did not get that as she was very stressed since her father's death      Comprehensive Medical and Social History  Patient Active Problem List   Diagnosis   • Hyperlipidemia   • Sore throat   • Medicare annual wellness visit, subsequent     Past Medical History:   Diagnosis Date   • Allergic rhinitis    • Cervical myelopathy (CMS/HCC) ([facility])    • Cervical radiculopathy    • Cervicogenic headache    • Closed fracture of sesamoid bone of foot    • Cricopharyngeal dysphagia    • Fibroids    • Food allergy    • GERD (gastroesophageal reflux disease)    • Lipid disorder    • Nephrolithiasis    • Osteoporosis      Past Surgical History:   Procedure Laterality Date   • ANTERIOR FUSION CERVICAL SPINE      c6-7   • CRICOPHARYNGEAL MYOTOMY     • LUMBAR FUSION      L5-S1   • MYOMECTOMY       Allergies   Allergen Reactions   • Shellfish Containing Products Anaphylaxis     Pumpkin seeds   • Adhesive      Steri strips   • [place] Other (see comments)   • Banana Other (see comments)     also allergic to almonds, berries   • Berry Flavor Other (see comments)   • Erythromycin    • Erythromycin Base Other (see comments)     GI Upset  Other reaction(s): GI side effects   • Iodine Other (see comments)     shell fish allergy   • Latex Other (see comments)     patient not sure   • Levonorgestrel-Ethinyl Estrad    • Naprelan    • Naproxen Other (see comments)     dysphagia throat burning   • Naproxen Sodium      Other  reaction(s): lump in throat   • Penicillin G    • Penicillin V Potassium Hives   • Penicillins Hives   • Pineapple Other (see comments)   • Pumpkin Seed      Other reaction(s): throat gets tight   • Shellfish Derived Hives     Current Outpatient Prescriptions   Medication Sig Dispense Refill   • b complex vitamins tablet Take 400 mg by mouth daily.     • cetirizine (ZyrTEC) 10 mg tablet Take 1 tablet by mouth daily.     • cholecalciferol, vitamin D3, (VITAMIN D3) 1,000 unit capsule take 1 tablet by oral route  every day     • estradiol (VIVELLE-DOT) 0.0375 mg/24 hr 0.0375 patches.  3   • fluticasone (FLONASE) 50 mcg/actuation nasal spray Administer 2 sprays into each nostril daily. 1 Bottle 2   • folic acid/multivit,iron, (CENTRUM ORAL) Take by mouth.     • gabapentin (NEURONTIN) 300 mg capsule Take 2,700 mg by mouth 3 (three) times a day.       • hydrochlorothiazide (HYDRODIURIL) 25 mg tablet Take 0.5 tablets (12.5 mg total) by mouth daily. 45 tablet 1   • hydrochlorothiazide (MICROZIDE) 12.5 mg capsule Take 12.5 mg by mouth daily.     • KLOR-CON M10 10 mEq CR tablet TAKE 1 TABLET EVERY DAY 90 tablet 1   • meloxicam (MOBIC) 7.5 mg tablet Take 7.5 mg by mouth daily.     • mv-min-FA-Hh-Bc-ilagfuj-lutein 0.4-162-18 mg tablet Take 1 tablet by mouth daily.     • pantoprazole (PROTONIX) 40 mg EC tablet TAKE 1 TABLET BY MOUTH EVERY DAY 30 tablet 3   • progesterone (PROMETRIUM) 100 mg capsule Take 100 mg by mouth once daily.  3   • valACYclovir (VALTREX) 500 mg tablet Take 500 mg by mouth.       No current facility-administered medications for this visit.      Social History     Social History   • Marital status:      Spouse name: N/A   • Number of children: N/A   • Years of education: N/A     Social History Main Topics   • Smoking status: Never Smoker   • Smokeless tobacco: Never Used   • Alcohol use None   • Drug use: Unknown   • Sexual activity: Not Asked     Other Topics Concern   • None     Social History  "Narrative   • None       Objective   Vitals  Vitals:    12/28/18 0819   BP: 100/60   Pulse: 75   Resp: 18   Temp: 37 °C (98.6 °F)   TempSrc: Oral   SpO2: 98%   Weight: 52 kg (114 lb 9.6 oz)   Height: 1.461 m (4' 9.5\")     Body mass index is 24.37 kg/m².    Advanced Care Plan  Does patient have advance directive?: Yes       Patient has Advance Directive: Patient has Advance Directive, has not brought in                             PHQ  Over the Past 2 Weeks, Have You Felt Down, Depressed or Hopeless?: no  Over the Past 2 Weeks, Have You Felt Little Interest or Pleasure In Doing Things?: no                                          Mini Cog  Completed: Yes  Score: 5  Result: Negative    Get Up and Go  Result: Pass            STEADI Falls Risk  One or more falls in the last year: No           Has trouble stepping up onto a curb: No   Advised to use a cane or walker to get around safely: No   Often has to rush to the toilet: No   Feels unsteady when walking: No   Has lost some feeling in feet: No   Often feels sad or depressed: No   Steadies self on furniture while walking at home: No   Takes medication that makes him/her feel lightheaded or more tired than usual: No   Worried about falling: No   Takes medicine to sleep or improve mood: No   Needs to push with hands when rising from a chair: No   Falls screen completed: Yes       Hearing and Vision Screening   Visual Acuity Screening    Right eye Left eye Both eyes   Without correction:      With correction: 20/25 20/25 20/20         Assessment/Plan   Problem List Items Addressed This Visit        Other    Medicare annual wellness visit, subsequent - Primary     Reviewed health maintenance measures  She is current on GYN exam and mammograms colonoscopy was in 2016 and will be due in 2021 last DEXA scan was in 2016 and she will schedule a DEXA scan and follow-up with Dr. Crow  Eye and  dental exams advised  Safety precautions reviewed         Hyperlipidemia     History " of elevated lipids  Discussed in detail that she will schedule calcium score for risk stratification  Discussed diet and lifestyle changes also discussed that she may need to start a statin  She will call Dr. Gaona and get a prescription for calcium score         Osteoporosis     Fair control  Schedule DEXA scan for follow-up  Also make an appointment to see Dr. Crow  Continue the calcium and vitamin D3 as discussed and try to do weightbearing exercises few times a week         Relevant Orders    DEXA BONE DENSITY    Cervical radiculopathy     She has chronic pain from cervical radiculopathy and myelopathy  She sees Dr. Arnett  at [facility] and will continue to take the current meds         Allergic rhinitis     Fair control  She was advised to start fluticasone nasal spray 2 sprays daily for 2 weeks               See Patient Instructions (the written plan) which was given to the patient for PPPS and health risk factors with interventions.

## 2018-12-28 NOTE — ASSESSMENT & PLAN NOTE
She has chronic pain from cervical radiculopathy and myelopathy  She sees Dr. Arnett  at [facility] and will continue to take the current meds

## 2018-12-31 DIAGNOSIS — Z82.49 FAMILY HISTORY OF CORONARY ARTERIOSCLEROSIS: Primary | ICD-10-CM

## 2019-01-18 ENCOUNTER — HOSPITAL ENCOUNTER (OUTPATIENT)
Dept: RADIOLOGY | Facility: HOSPITAL | Age: 54
Discharge: HOME | End: 2019-01-18
Attending: INTERNAL MEDICINE
Payer: MEDICARE

## 2019-01-18 ENCOUNTER — TELEPHONE (OUTPATIENT)
Dept: CARDIOLOGY | Facility: CLINIC | Age: 54
End: 2019-01-18

## 2019-01-18 ENCOUNTER — HOSPITAL ENCOUNTER (OUTPATIENT)
Dept: RADIOLOGY | Facility: HOSPITAL | Age: 54
Discharge: HOME | End: 2019-01-18
Attending: NURSE PRACTITIONER
Payer: MEDICARE

## 2019-01-18 DIAGNOSIS — M81.0 OSTEOPOROSIS, UNSPECIFIED OSTEOPOROSIS TYPE, UNSPECIFIED PATHOLOGICAL FRACTURE PRESENCE: ICD-10-CM

## 2019-01-18 DIAGNOSIS — Z82.49 FAMILY HISTORY OF CORONARY ARTERIOSCLEROSIS: ICD-10-CM

## 2019-01-18 PROCEDURE — 75571 CT HRT W/O DYE W/CA TEST: CPT

## 2019-01-18 PROCEDURE — 77080 DXA BONE DENSITY AXIAL: CPT

## 2019-01-18 NOTE — TELEPHONE ENCOUNTER
I s/w pt.  Her CCS was 0 on recent study, low risk.    1.2 cm hepatic hypodensity noted, ? Cyst.  I asked that she f/u w/ her PMD and will forward the results.

## 2019-01-21 ENCOUNTER — TELEPHONE (OUTPATIENT)
Dept: INTERNAL MEDICINE | Facility: CLINIC | Age: 54
End: 2019-01-21

## 2019-01-21 ENCOUNTER — HOSPITAL ENCOUNTER (OUTPATIENT)
Dept: RADIOLOGY | Facility: HOSPITAL | Age: 54
Discharge: HOME | End: 2019-01-21
Attending: INTERNAL MEDICINE
Payer: MEDICARE

## 2019-01-21 DIAGNOSIS — Z87.442 HISTORY OF KIDNEY STONES: ICD-10-CM

## 2019-01-21 DIAGNOSIS — R10.9 ABDOMINAL PAIN, UNSPECIFIED ABDOMINAL LOCATION: ICD-10-CM

## 2019-01-21 DIAGNOSIS — R10.9 ABDOMINAL PAIN, UNSPECIFIED ABDOMINAL LOCATION: Primary | ICD-10-CM

## 2019-01-21 PROCEDURE — 74176 CT ABD & PELVIS W/O CONTRAST: CPT

## 2019-01-21 NOTE — TELEPHONE ENCOUNTER
----- Message from MARY Lopez sent at 1/18/2019  3:23 PM EST -----  Dear Dr. Tesfaye:  I wanted to forward recent CCS results of 0.  Incidental finding of 1.2 cm hepatic hypodensity, ? Cyst.  She will follow up with you in that regard.  Thank you,  MARY Lopez  ----- Message -----  From: Interface, Radiology Results In  Sent: 1/18/2019   1:14 PM  To: MARY Lopez

## 2019-01-21 NOTE — TELEPHONE ENCOUNTER
Spoke with the patient and reviewed the calcium score results which also shows the questionable cyst in the liver  The patient told me that she has been having right-sided abdominal pain and has history of kidney stones  We discussed that she will schedule a CT scan of the abdomen and pelvis without contrast today I advised her to drink lots of fluids and give her contact information for urology  Dr. Morocho/Dr. Derick Espinoza  We also reviewed the DEXA scan results and she will follow-up with her rheumatologist Dr. Crow

## 2019-01-21 NOTE — LETTER
January 21, 2019     Rajwinder CUNNINGHAM Sherrell  [address]  [address]  [place] [id number]      Dear Ms. Wynne:    Below are the results from your recent visit:    Resulted Orders   CT ABDOMEN PELVIS WITHOUT IV CONTRAST    Narrative    CLINICAL HISTORY: Abdominal pain history of renal stones.    CT DOSE:  One or more dose reduction techniques (e.g. automated exposure  control, adjustment of the mA and/or kV according to patient size, use of  iterative reconstruction technique) utilized for this examination.    COMMENT:  TECHNIQUE:  Axial images were obtained through the abdomen and pelvis without IV  or oral contrast.  Sagittal and coronal reconstructions were performed.    Comparison: None    The lung bases are clear.  There are no pleural effusions.  The liver,  spleen-splenule, pancreas, small and large bowel are intact with a small right  hepatic cyst noted..  The gallbladder is fluid-filled.  No gallstones are seen.  The adrenal glands are normal in size.  Both kidneys are normal in size.  Several right lower pole renal stones are seen ranging in size from 1 to 3 mm, a  total of three are visualized..  Bilateral renal cysts are seen limited by  noncontrast technique..  There is no hydronephrosis.  No free fluid or free air  is seen.  Vascular structures are intact with calcification, and the osseous  structures demonstrate degenerative/postsurgical changes.  There is no  lymphadenopathy.    Uterus is present.  Bladder is normal.   Pelvic calcifications are present,  which appear outside the ureter and bladder.      Impression    IMPRESSION:  Both kidneys are normal in size.  Several right lower pole renal stones are seen  ranging in size from 1 to 3 mm, a total of three are visualized..  Bilateral  renal cysts are seen limited by noncontrast technique..       As discussed this is a copy of the test results  Sincerely,          Maya Tesfaye MD

## 2019-01-21 NOTE — TELEPHONE ENCOUNTER
Spoke with the patient and reviewed the results of the CT scan which shows the several right lower pole renal stones ranging in size from 1-3mm  We also discussed the bilateral renal cysts and a small hepatic cyst  She has discomfort mostly on the left side but it is tolerable we discussed that she will drink lots of fluids she has an appointment to see Dr. Ildefonso Lam  urology next week  Advised her to go to the ER for worsening pain she requested a copy be faxed over to her house

## 2019-01-23 RX ORDER — FLUTICASONE PROPIONATE 50 MCG
SPRAY, SUSPENSION (ML) NASAL
Qty: 16 G | Refills: 3 | Status: SHIPPED | OUTPATIENT
Start: 2019-01-23 | End: 2019-04-27 | Stop reason: SDUPTHER

## 2019-02-04 ENCOUNTER — OFFICE VISIT (OUTPATIENT)
Dept: INTERNAL MEDICINE | Facility: CLINIC | Age: 54
End: 2019-02-04
Payer: MEDICARE

## 2019-02-04 ENCOUNTER — TELEPHONE (OUTPATIENT)
Dept: INTERNAL MEDICINE | Facility: CLINIC | Age: 54
End: 2019-02-04

## 2019-02-04 VITALS
OXYGEN SATURATION: 97 % | RESPIRATION RATE: 16 BRPM | HEART RATE: 95 BPM | TEMPERATURE: 99.6 F | DIASTOLIC BLOOD PRESSURE: 64 MMHG | BODY MASS INDEX: 24.59 KG/M2 | SYSTOLIC BLOOD PRESSURE: 102 MMHG | HEIGHT: 57 IN | WEIGHT: 114 LBS

## 2019-02-04 DIAGNOSIS — J30.9 ALLERGIC RHINITIS, UNSPECIFIED SEASONALITY, UNSPECIFIED TRIGGER: ICD-10-CM

## 2019-02-04 DIAGNOSIS — R68.89 FLU-LIKE SYMPTOMS: Primary | ICD-10-CM

## 2019-02-04 LAB
RAPID INFLUENZA A AGN: NORMAL
RAPID INFLUENZA B AGN: NORMAL

## 2019-02-04 PROCEDURE — 87804 INFLUENZA ASSAY W/OPTIC: CPT | Mod: QW | Performed by: NURSE PRACTITIONER

## 2019-02-04 PROCEDURE — 99213 OFFICE O/P EST LOW 20 MIN: CPT | Performed by: NURSE PRACTITIONER

## 2019-02-04 ASSESSMENT — ENCOUNTER SYMPTOMS
SINUS PRESSURE: 0
FLANK PAIN: 0
EYE ITCHING: 0
JOINT SWELLING: 0
FLU SYMPTOMS: 1
BLOOD IN STOOL: 0
NUMBNESS: 0
CONSTIPATION: 0
EYE REDNESS: 0
DYSURIA: 0
ANOREXIA: 0
SHORTNESS OF BREATH: 0
CHANGE IN BOWEL HABIT: 0
CHEST TIGHTNESS: 0
CHILLS: 1
RHINORRHEA: 1
FREQUENCY: 0
DIFFICULTY URINATING: 0
ARTHRALGIAS: 0
MYALGIAS: 1
DIARRHEA: 0
NECK STIFFNESS: 0
WHEEZING: 0
FEVER: 0
ABDOMINAL PAIN: 0
SEIZURES: 0
CONFUSION: 0
HEMATURIA: 0
BACK PAIN: 0
VISUAL CHANGE: 0
VERTIGO: 0
EYE DISCHARGE: 0
SINUS PAIN: 0
TREMORS: 0
NECK PAIN: 0
FATIGUE: 1
SWOLLEN GLANDS: 0
HEADACHES: 1
SORE THROAT: 1
PALPITATIONS: 0
VOMITING: 0
DIZZINESS: 0
NAUSEA: 1
COUGH: 1
WEAKNESS: 0
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
## 2019-02-04 NOTE — PROGRESS NOTES
Daily Progress Note      Subjective      Patient ID: Rajwinder Wynne is a 53 y.o. female who presents here for     Cough   Associated symptoms include chills, headaches, myalgias, postnasal drip, rhinorrhea and a sore throat. Pertinent negatives include no chest pain, ear pain, eye redness, fever, rash, shortness of breath or wheezing. There is no history of environmental allergies.   Fatigue   Associated symptoms include chills, congestion, coughing, fatigue, headaches, myalgias, nausea and a sore throat. Pertinent negatives include no abdominal pain, anorexia, arthralgias, change in bowel habit, chest pain, fever, joint swelling, neck pain, numbness, rash, swollen glands, urinary symptoms, vertigo, visual change, vomiting or weakness.   Flu Symptoms   This is a new problem. The current episode started yesterday. The problem occurs constantly. The problem has been unchanged. Associated symptoms include chills, congestion, coughing, fatigue, headaches, myalgias, nausea and a sore throat. Pertinent negatives include no abdominal pain, anorexia, arthralgias, change in bowel habit, chest pain, fever, joint swelling, neck pain, numbness, rash, swollen glands, urinary symptoms, vertigo, visual change, vomiting or weakness. She has tried NSAIDs for the symptoms. The treatment provided no relief.         Review of Systems   Constitutional: Positive for chills and fatigue. Negative for fever.   HENT: Positive for congestion, postnasal drip, rhinorrhea and sore throat. Negative for ear pain, sinus pain, sinus pressure, sneezing and tinnitus.    Eyes: Negative for discharge, redness and itching.   Respiratory: Positive for cough. Negative for chest tightness, shortness of breath and wheezing.    Cardiovascular: Negative for chest pain, palpitations and leg swelling.   Gastrointestinal: Positive for nausea. Negative for abdominal pain, anorexia, blood in stool, change in bowel habit, constipation, diarrhea and vomiting.    Endocrine: Negative for cold intolerance and heat intolerance.   Genitourinary: Negative for difficulty urinating, dysuria, flank pain, frequency, hematuria and urgency.   Musculoskeletal: Positive for myalgias. Negative for arthralgias, back pain, joint swelling, neck pain and neck stiffness.   Skin: Negative for rash.   Allergic/Immunologic: Negative for environmental allergies.   Neurological: Positive for headaches. Negative for dizziness, vertigo, tremors, seizures, weakness and numbness.   Psychiatric/Behavioral: Negative for confusion.       Current Outpatient Prescriptions   Medication Sig Dispense Refill   • b complex vitamins tablet Take 400 mg by mouth daily.     • cetirizine (ZyrTEC) 10 mg tablet Take 1 tablet by mouth daily.     • cholecalciferol, vitamin D3, (VITAMIN D3) 1,000 unit capsule take 1 tablet by oral route  every day     • estradiol (VIVELLE-DOT) 0.0375 mg/24 hr 0.0375 patches.  3   • fluticasone (FLONASE) 50 mcg/actuation nasal spray SPRAY 2 SPRAYS INTO EACH NOSTRIL EVERY DAY 16 g 3   • folic acid/multivit,iron, (CENTRUM ORAL) Take by mouth.     • gabapentin (NEURONTIN) 300 mg capsule Take 2,700 mg by mouth 3 (three) times a day.       • hydrochlorothiazide (HYDRODIURIL) 25 mg tablet Take 0.5 tablets (12.5 mg total) by mouth daily. 45 tablet 1   • KLOR-CON M10 10 mEq CR tablet TAKE 1 TABLET EVERY DAY 90 tablet 1   • pantoprazole (PROTONIX) 40 mg EC tablet TAKE 1 TABLET BY MOUTH EVERY DAY 30 tablet 3   • progesterone (PROMETRIUM) 100 mg capsule Take 100 mg by mouth once daily.  3   • meloxicam (MOBIC) 7.5 mg tablet Take 7.5 mg by mouth daily.     • mv-min-FA-Es-Lo-nrhajfk-lutein 0.4-162-18 mg tablet Take 1 tablet by mouth daily.       No current facility-administered medications for this visit.      Past Medical History:   Diagnosis Date   • Allergic rhinitis    • Cervical myelopathy (CMS/HCC) (H[facility])    • Cervical radiculopathy    • Cervicogenic headache    • Closed fracture of  sesamoid bone of foot    • Cricopharyngeal dysphagia    • Fibroids    • Food allergy    • GERD (gastroesophageal reflux disease)    • Lipid disorder    • Nephrolithiasis    • Osteoporosis      Family History   Problem Relation Age of Onset   • Hyperlipidemia Mother    • Hypertension Mother    • Pulmonary fibrosis Father    • Colon cancer Maternal Grandfather    • Breast cancer Mother's Sister    • Ovarian cancer Father's Sister      Past Surgical History:   Procedure Laterality Date   • ANTERIOR FUSION CERVICAL SPINE      c6-7   • CRICOPHARYNGEAL MYOTOMY     • LUMBAR FUSION      L5-S1   • MYOMECTOMY       Social History     Social History   • Marital status:      Spouse name: N/A   • Number of children: N/A   • Years of education: N/A     Occupational History   • Not on file.     Social History Main Topics   • Smoking status: Never Smoker   • Smokeless tobacco: Never Used   • Alcohol use Not on file   • Drug use: Unknown   • Sexual activity: Not on file     Other Topics Concern   • Not on file     Social History Narrative   • No narrative on file     Allergies   Allergen Reactions   • Shellfish Containing Products Anaphylaxis     Pumpkin seeds   • Adhesive      Steri strips   • [place] Other (see comments)   • Banana Other (see comments)     also allergic to almonds, berries   • Berry Flavor Other (see comments)   • Erythromycin    • Erythromycin Base Other (see comments)     GI Upset  Other reaction(s): GI side effects   • Iodine Other (see comments)     shell fish allergy   • Latex Other (see comments)     patient not sure   • Levonorgestrel-Ethinyl Estrad    • Naprelan    • Naproxen Other (see comments)     dysphagia throat burning   • Naproxen Sodium      Other reaction(s): lump in throat   • Penicillin G    • Penicillin V Potassium Hives   • Penicillins Hives   • Pineapple Other (see comments)   • Pumpkin Seed      Other reaction(s): throat gets tight   • Shellfish Derived Hives       Objective   Vitals:  "   02/04/19 1123   BP: 102/64   Pulse: 95   Resp: 16   Temp: 37.6 °C (99.6 °F)   TempSrc: Oral   SpO2: 97%   Weight: 51.7 kg (114 lb)   Height: 1.448 m (4' 9\")     Body mass index is 24.67 kg/m².      Physical Exam   Constitutional: She is oriented to person, place, and time. She appears well-developed and well-nourished.   HENT:   Head: Normocephalic.   Right Ear: Tympanic membrane, external ear and ear canal normal.   Left Ear: Tympanic membrane, external ear and ear canal normal.   Nose: Rhinorrhea present. Right sinus exhibits no maxillary sinus tenderness and no frontal sinus tenderness. Left sinus exhibits no maxillary sinus tenderness and no frontal sinus tenderness.   Mouth/Throat: Posterior oropharyngeal erythema present. No oropharyngeal exudate.   Eyes: Conjunctivae and EOM are normal. Pupils are equal, round, and reactive to light.   Neck: Normal range of motion. Neck supple.   Cardiovascular: Normal rate, regular rhythm and normal heart sounds.    Pulmonary/Chest: Effort normal and breath sounds normal.   Abdominal: Soft. Bowel sounds are normal.   Musculoskeletal: Normal range of motion.   Neurological: She is alert and oriented to person, place, and time.   Skin: Skin is warm and dry.   Psychiatric: She has a normal mood and affect. Her behavior is normal.       Assessment/Plan     Problem List Items Addressed This Visit        Other    Allergic rhinitis     Take zyrtec   Stay well hydrated           Other Visit Diagnoses     Flu-like symptoms    -  Primary    Rapid flu is neg  Mucinex for cough  Saline nasal spray  Warm salt water gargles  Push fluids  Call if no improvement      Relevant Orders    POCT Influenza A/B (Completed)            MARY Mcconnell  2/4/2019    "

## 2019-02-04 NOTE — PATIENT INSTRUCTIONS
Mucinex for cough  Take zyrtec   Saline nasal spray  Warm salt water gargles  Push fluids  Call if no improvement

## 2019-02-04 NOTE — TELEPHONE ENCOUNTER
Pt C/O of ear pain after her ear was examined by Amalia. She sated that she felt the slight discomfort in the office at her visit but she did not say anything. Pt wants to know if there is anything she could or should be doing. Pls advise.

## 2019-02-07 ENCOUNTER — TELEPHONE (OUTPATIENT)
Dept: INTERNAL MEDICINE | Facility: CLINIC | Age: 54
End: 2019-02-07

## 2019-02-07 NOTE — TELEPHONE ENCOUNTER
PT was seen earlier in the week w/ Amalia, requesting a phone call to follow-up.  Said her mucus has turned yellow-[id number], but otherwise she's feeling pretty much the same.    Cb# [phone]

## 2019-02-08 ENCOUNTER — OFFICE VISIT (OUTPATIENT)
Dept: INTERNAL MEDICINE | Facility: CLINIC | Age: 54
End: 2019-02-08
Payer: MEDICARE

## 2019-02-08 VITALS
SYSTOLIC BLOOD PRESSURE: 100 MMHG | RESPIRATION RATE: 18 BRPM | OXYGEN SATURATION: 98 % | WEIGHT: 114 LBS | BODY MASS INDEX: 24.59 KG/M2 | HEIGHT: 57 IN | HEART RATE: 89 BPM | DIASTOLIC BLOOD PRESSURE: 60 MMHG | TEMPERATURE: 98.3 F

## 2019-02-08 DIAGNOSIS — J06.9 UPPER RESPIRATORY TRACT INFECTION, UNSPECIFIED TYPE: Primary | ICD-10-CM

## 2019-02-08 PROBLEM — M81.0 OSTEOPOROSIS: Status: RESOLVED | Noted: 2018-12-28 | Resolved: 2019-02-08

## 2019-02-08 PROBLEM — E78.5 HYPERLIPIDEMIA: Status: RESOLVED | Noted: 2018-07-09 | Resolved: 2019-02-08

## 2019-02-08 PROBLEM — Z00.00 MEDICARE ANNUAL WELLNESS VISIT, SUBSEQUENT: Status: RESOLVED | Noted: 2018-12-28 | Resolved: 2019-02-08

## 2019-02-08 PROCEDURE — 99213 OFFICE O/P EST LOW 20 MIN: CPT | Performed by: INTERNAL MEDICINE

## 2019-02-08 RX ORDER — ALBUTEROL SULFATE 90 UG/1
2 INHALANT RESPIRATORY (INHALATION) EVERY 6 HOURS PRN
Qty: 1 INHALER | Refills: 0 | Status: SHIPPED | OUTPATIENT
Start: 2019-02-08 | End: 2019-03-26

## 2019-02-08 RX ORDER — BENZONATATE 100 MG/1
100 CAPSULE ORAL 2 TIMES DAILY PRN
Qty: 20 CAPSULE | Refills: 0 | Status: SHIPPED | OUTPATIENT
Start: 2019-02-08 | End: 2019-03-26

## 2019-02-08 RX ORDER — VALACYCLOVIR HYDROCHLORIDE 500 MG/1
TABLET, FILM COATED ORAL
COMMUNITY
Start: 2019-02-05 | End: 2019-06-18

## 2019-02-08 RX ORDER — AZITHROMYCIN 250 MG/1
250 TABLET, FILM COATED ORAL DAILY
Qty: 6 TABLET | Refills: 0 | Status: SHIPPED | OUTPATIENT
Start: 2019-02-08 | End: 2019-03-26

## 2019-02-08 ASSESSMENT — ENCOUNTER SYMPTOMS
PALPITATIONS: 0
FEVER: 0
DIZZINESS: 0
ABDOMINAL PAIN: 0
HEMATURIA: 0
BRUISES/BLEEDS EASILY: 0
DIARRHEA: 0
WHEEZING: 1
COUGH: 1
NAUSEA: 0
FATIGUE: 0
HEADACHES: 1
NUMBNESS: 0
DYSURIA: 0
CHILLS: 1
CONFUSION: 0
BLOOD IN STOOL: 0
BACK PAIN: 0
EYE PAIN: 0
SORE THROAT: 1
VOMITING: 0
SHORTNESS OF BREATH: 0

## 2019-02-08 NOTE — PROGRESS NOTES
Subjective      Patient ID: Rajwinder Wynne is a 53 y.o. female     Cough   This is a new problem. The current episode started 1 to 4 weeks ago. The problem has been gradually worsening. The cough is non-productive. Associated symptoms include chills, headaches, nasal congestion, postnasal drip, a sore throat and wheezing. Pertinent negatives include no chest pain, ear pain, fever, rash or shortness of breath. Associated symptoms comments: Flu like sx. The symptoms are aggravated by lying down. She has tried OTC cough suppressant for the symptoms. Her past medical history is significant for environmental allergies.     She was seen in the office on 2/4/19 for flulike symptoms and persisting cough and chest congestion negative for flu she was advised to take benzonatate Perles and Mucinex she has some improvement in the ear discomfort but continues to have cough and chest congestion and wheezing and feels that her symptoms are not getting better she feels tired and overwhelmed with cough      Past Medical History:   Diagnosis Date   • Allergic rhinitis    • Cervical myelopathy (CMS/HCC) (HCC)    • Cervical radiculopathy    • Cervicogenic headache    • Closed fracture of sesamoid bone of foot    • Cricopharyngeal dysphagia    • Fibroids    • Food allergy    • GERD (gastroesophageal reflux disease)    • Lipid disorder    • Nephrolithiasis    • Osteoporosis        Past Surgical History:   Procedure Laterality Date   • ANTERIOR FUSION CERVICAL SPINE      c6-7   • CRICOPHARYNGEAL MYOTOMY     • LUMBAR FUSION      L5-S1   • MYOMECTOMY         Family History   Problem Relation Age of Onset   • Hyperlipidemia Mother    • Hypertension Mother    • Pulmonary fibrosis Father    • Colon cancer Maternal Grandfather    • Breast cancer Mother's Sister    • Ovarian cancer Father's Sister        Social History     Social History   • Marital status:      Spouse name: N/A   • Number of children: N/A   • Years of education: N/A      Occupational History   • Not on file.     Social History Main Topics   • Smoking status: Never Smoker   • Smokeless tobacco: Never Used   • Alcohol use Not on file   • Drug use: Unknown   • Sexual activity: Not on file     Other Topics Concern   • Not on file     Social History Narrative   • No narrative on file       Allergies   Allergen Reactions   • Shellfish Containing Products Anaphylaxis     Pumpkin seeds   • Adhesive      Steri strips   • [place] Other (see comments)   • Banana Other (see comments)     also allergic to almonds, berries   • Berry Flavor Other (see comments)   • Erythromycin    • Erythromycin Base Other (see comments)     GI Upset  Other reaction(s): GI side effects   • Iodine Other (see comments)     shell fish allergy   • Latex Other (see comments)     patient not sure   • Levonorgestrel-Ethinyl Estrad    • Naprelan    • Naproxen Other (see comments)     dysphagia throat burning   • Naproxen Sodium      Other reaction(s): lump in throat   • Penicillin G    • Penicillin V Potassium Hives   • Penicillins Hives   • Pineapple Other (see comments)   • Pumpkin Seed      Other reaction(s): throat gets tight   • Shellfish Derived Hives       Current Outpatient Prescriptions   Medication Sig Dispense Refill   • b complex vitamins tablet Take 400 mg by mouth daily.     • cetirizine (ZyrTEC) 10 mg tablet Take 1 tablet by mouth daily.     • cholecalciferol, vitamin D3, (VITAMIN D3) 1,000 unit capsule take 1 tablet by oral route  every day     • estradiol (VIVELLE-DOT) 0.0375 mg/24 hr 0.0375 patches.  3   • fluticasone (FLONASE) 50 mcg/actuation nasal spray SPRAY 2 SPRAYS INTO EACH NOSTRIL EVERY DAY 16 g 3   • folic acid/multivit,iron, (CENTRUM ORAL) Take by mouth.     • gabapentin (NEURONTIN) 300 mg capsule Take 2,700 mg by mouth 3 (three) times a day.       • hydrochlorothiazide (HYDRODIURIL) 25 mg tablet Take 0.5 tablets (12.5 mg total) by mouth daily. 45 tablet 1   • KLOR-CON M10 10 mEq CR  "tablet TAKE 1 TABLET EVERY DAY 90 tablet 1   • meloxicam (MOBIC) 7.5 mg tablet Take 7.5 mg by mouth daily.     • mv-min-FA-Iw-Xg-bjutlzf-lutein 0.4-162-18 mg tablet Take 1 tablet by mouth daily.     • pantoprazole (PROTONIX) 40 mg EC tablet TAKE 1 TABLET BY MOUTH EVERY DAY 30 tablet 3   • progesterone (PROMETRIUM) 100 mg capsule Take 100 mg by mouth once daily.  3   • valACYclovir (VALTREX) 500 mg tablet      • albuterol HFA (VENTOLIN HFA) 90 mcg/actuation inhaler Inhale 2 puffs every 6 (six) hours as needed for wheezing. 1 Inhaler 0   • azithromycin (ZITHROMAX) 250 mg tablet Take 1 tablet (250 mg total) by mouth daily for 5 days. Take 2 tablets the first day, then 1 tablet daily for 4 days. 6 tablet 0   • benzonatate (TESSALON PERLES) 100 mg capsule Take 1 capsule (100 mg total) by mouth 2 (two) times a day as needed for cough for up to 10 days. 20 capsule 0     No current facility-administered medications for this visit.        /60   Pulse 89   Temp 36.8 °C (98.3 °F) (Oral)   Resp 18   Ht 1.448 m (4' 9\")   Wt 51.7 kg (114 lb)   SpO2 98%   BMI 24.67 kg/m²       The following have been reviewed and updated as appropriate in this visit:  Allergies  Meds  Problems       Review of Systems   Constitutional: Positive for chills. Negative for fatigue and fever.   HENT: Positive for postnasal drip and sore throat. Negative for ear pain.    Eyes: Negative for pain and visual disturbance.   Respiratory: Positive for cough and wheezing. Negative for shortness of breath.    Cardiovascular: Negative for chest pain, palpitations and leg swelling.   Gastrointestinal: Negative for abdominal pain, blood in stool, diarrhea, nausea and vomiting.   Genitourinary: Negative for dysuria and hematuria.   Musculoskeletal: Negative for back pain.   Skin: Negative for rash.   Allergic/Immunologic: Positive for environmental allergies.   Neurological: Positive for headaches. Negative for dizziness and numbness. "   Hematological: Does not bruise/bleed easily.   Psychiatric/Behavioral: Negative for confusion.       Objective       Physical Exam   Constitutional: She is oriented to person, place, and time. She appears well-developed and well-nourished.   Looks tired    HENT:   Head: Normocephalic and atraumatic.   Right Ear: External ear normal.   Left Ear: External ear normal.   Nose: Nose normal.   Dry oral mucosa   Eyes: Conjunctivae and EOM are normal. Pupils are equal, round, and reactive to light.   Neck: Normal range of motion. Neck supple. No thyromegaly present.   Cardiovascular: Normal rate, regular rhythm, normal heart sounds and intact distal pulses.    Pulmonary/Chest: Effort normal. No respiratory distress. She exhibits no tenderness.   Coarse breath sounds  Scattered anterior occasional wheeze   Abdominal: Soft. Bowel sounds are normal. There is no tenderness. There is no guarding.   Musculoskeletal: Normal range of motion. She exhibits no tenderness.   Lymphadenopathy:     She has no cervical adenopathy.   Neurological: She is alert and oriented to person, place, and time. No cranial nerve deficit or sensory deficit.   Skin: Skin is warm and dry. No rash noted.   Psychiatric: She has a normal mood and affect. Her behavior is normal.   Nursing note and vitals reviewed.      Assessment/Plan     Upper respiratory tract infection  Persisting symptoms  I advised her to start benzonatate Perles 100 mg p.o. twice daily with food for 7 days  She was advised to start albuterol inhaler 1-2 puffs every 6 hours as needed I gave her a prescription for azithromycin to start if she is not better by next week  Rest and drink lots of fluids she will call me with an update on Monday    Allergic rhinitis  She was advised to continue the Zyrtec and the fluticasone nasal spray for 2 weeks          Maya Tesfaye MD  2/8/2019  7:39 PM

## 2019-02-08 NOTE — PATIENT INSTRUCTIONS
PLAN    Drink lots of fluids  Start benzonatate capsules twice daily  Use the albuterol inhaler every 6 hrs as needed  Use the zpak with food if not better by Monday

## 2019-02-09 NOTE — ASSESSMENT & PLAN NOTE
Persisting symptoms  I advised her to start benzonatate Perles 100 mg p.o. twice daily with food for 7 days  She was advised to start albuterol inhaler 1-2 puffs every 6 hours as needed I gave her a prescription for azithromycin to start if she is not better by next week  Rest and drink lots of fluids she will call me with an update on Monday

## 2019-02-18 RX ORDER — PANTOPRAZOLE SODIUM 40 MG/1
TABLET, DELAYED RELEASE ORAL
Qty: 30 TABLET | Refills: 3 | Status: SHIPPED | OUTPATIENT
Start: 2019-02-18 | End: 2019-05-22 | Stop reason: SDUPTHER

## 2019-03-23 ENCOUNTER — APPOINTMENT (EMERGENCY)
Dept: RADIOLOGY | Facility: HOSPITAL | Age: 54
End: 2019-03-23
Attending: EMERGENCY MEDICINE
Payer: MEDICARE

## 2019-03-23 ENCOUNTER — HOSPITAL ENCOUNTER (OUTPATIENT)
Facility: HOSPITAL | Age: 54
Setting detail: OBSERVATION
Discharge: HOME | End: 2019-03-24
Attending: EMERGENCY MEDICINE | Admitting: HOSPITALIST
Payer: MEDICARE

## 2019-03-23 DIAGNOSIS — G45.9 TIA (TRANSIENT ISCHEMIC ATTACK): ICD-10-CM

## 2019-03-23 DIAGNOSIS — R47.81 SLURRED SPEECH: Primary | ICD-10-CM

## 2019-03-23 LAB
GLUCOSE BLD-MCNC: 84 MG/DL (ref 70–99)
POCT TEST: NORMAL

## 2019-03-23 PROCEDURE — 83735 ASSAY OF MAGNESIUM: CPT | Performed by: EMERGENCY MEDICINE

## 2019-03-23 PROCEDURE — 85025 COMPLETE CBC W/AUTO DIFF WBC: CPT | Performed by: EMERGENCY MEDICINE

## 2019-03-23 PROCEDURE — 99285 EMERGENCY DEPT VISIT HI MDM: CPT | Mod: 25

## 2019-03-23 PROCEDURE — 93005 ELECTROCARDIOGRAM TRACING: CPT

## 2019-03-23 PROCEDURE — 93005 ELECTROCARDIOGRAM TRACING: CPT | Performed by: EMERGENCY MEDICINE

## 2019-03-23 PROCEDURE — 81001 URINALYSIS AUTO W/SCOPE: CPT | Performed by: EMERGENCY MEDICINE

## 2019-03-23 PROCEDURE — 36415 COLL VENOUS BLD VENIPUNCTURE: CPT | Performed by: EMERGENCY MEDICINE

## 2019-03-23 PROCEDURE — 80307 DRUG TEST PRSMV CHEM ANLYZR: CPT | Performed by: EMERGENCY MEDICINE

## 2019-03-23 PROCEDURE — 70450 CT HEAD/BRAIN W/O DYE: CPT

## 2019-03-23 PROCEDURE — 84484 ASSAY OF TROPONIN QUANT: CPT | Performed by: EMERGENCY MEDICINE

## 2019-03-23 PROCEDURE — 80053 COMPREHEN METABOLIC PANEL: CPT | Performed by: EMERGENCY MEDICINE

## 2019-03-23 PROCEDURE — 82550 ASSAY OF CK (CPK): CPT | Performed by: EMERGENCY MEDICINE

## 2019-03-23 PROCEDURE — 85610 PROTHROMBIN TIME: CPT | Performed by: EMERGENCY MEDICINE

## 2019-03-23 ASSESSMENT — ENCOUNTER SYMPTOMS
SHORTNESS OF BREATH: 0
SPEECH DIFFICULTY: 1
FEVER: 0
ABDOMINAL PAIN: 0
VOMITING: 0
NAUSEA: 0

## 2019-03-24 ENCOUNTER — APPOINTMENT (OUTPATIENT)
Dept: RADIOLOGY | Facility: HOSPITAL | Age: 54
Setting detail: OBSERVATION
End: 2019-03-24
Attending: HOSPITALIST
Payer: MEDICARE

## 2019-03-24 VITALS
DIASTOLIC BLOOD PRESSURE: 83 MMHG | HEIGHT: 57 IN | SYSTOLIC BLOOD PRESSURE: 131 MMHG | OXYGEN SATURATION: 98 % | HEART RATE: 66 BPM | TEMPERATURE: 98.4 F | RESPIRATION RATE: 16 BRPM | WEIGHT: 106 LBS | BODY MASS INDEX: 22.87 KG/M2

## 2019-03-24 PROBLEM — R47.81 SLURRED SPEECH: Status: ACTIVE | Noted: 2019-03-24

## 2019-03-24 PROBLEM — I10 ESSENTIAL HYPERTENSION: Status: ACTIVE | Noted: 2019-03-24

## 2019-03-24 LAB
ALBUMIN SERPL-MCNC: 3.9 G/DL (ref 3.4–5)
ALP SERPL-CCNC: 76 IU/L (ref 35–126)
ALT SERPL-CCNC: 15 IU/L (ref 11–54)
AMPHET UR QL SCN: NORMAL
ANION GAP SERPL CALC-SCNC: 10 MEQ/L (ref 3–15)
ANION GAP SERPL CALC-SCNC: 11 MEQ/L (ref 3–15)
ANION GAP SERPL CALC-SCNC: 8 MEQ/L (ref 3–15)
AST SERPL-CCNC: 22 IU/L (ref 15–41)
ATRIAL RATE: 71
BACTERIA URNS QL MICRO: ABNORMAL /HPF
BARBITURATES UR QL SCN: NORMAL
BASOPHILS # BLD: 0.08 K/UL (ref 0.01–0.1)
BASOPHILS NFR BLD: 1 %
BENZODIAZ UR QL SCN: NORMAL
BILIRUB SERPL-MCNC: 0.3 MG/DL (ref 0.3–1.2)
BILIRUB UR QL STRIP.AUTO: NEGATIVE MG/DL
BUN SERPL-MCNC: 12 MG/DL (ref 8–20)
BUN SERPL-MCNC: 12 MG/DL (ref 8–20)
BUN SERPL-MCNC: 17 MG/DL (ref 8–20)
CALCIUM SERPL-MCNC: 9.1 MG/DL (ref 8.9–10.3)
CALCIUM SERPL-MCNC: 9.4 MG/DL (ref 8.9–10.3)
CALCIUM SERPL-MCNC: 9.4 MG/DL (ref 8.9–10.3)
CANNABINOIDS UR QL SCN: NORMAL
CHLORIDE SERPL-SCNC: 106 MEQ/L (ref 98–109)
CHLORIDE SERPL-SCNC: 106 MEQ/L (ref 98–109)
CHLORIDE SERPL-SCNC: 108 MEQ/L (ref 98–109)
CHOLEST SERPL-MCNC: 223 MG/DL
CK SERPL-CCNC: 78 U/L (ref 15–200)
CLARITY UR REFRACT.AUTO: ABNORMAL
CO2 SERPL-SCNC: 23 MEQ/L (ref 22–32)
CO2 SERPL-SCNC: 24 MEQ/L (ref 22–32)
CO2 SERPL-SCNC: 25 MEQ/L (ref 22–32)
COCAINE UR QL SCN: NORMAL
COLOR UR AUTO: YELLOW
CREAT SERPL-MCNC: 0.6 MG/DL
CREAT SERPL-MCNC: 0.6 MG/DL
CREAT SERPL-MCNC: 0.7 MG/DL
DIFFERENTIAL METHOD BLD: ABNORMAL
EOSINOPHIL # BLD: 0.47 K/UL (ref 0.04–0.36)
EOSINOPHIL NFR BLD: 6.1 %
ERYTHROCYTE [DISTWIDTH] IN BLOOD BY AUTOMATED COUNT: 13.2 % (ref 11.7–14.4)
ERYTHROCYTE [DISTWIDTH] IN BLOOD BY AUTOMATED COUNT: 13.3 % (ref 11.7–14.4)
GFR SERPL CREATININE-BSD FRML MDRD: >60 ML/MIN/1.73M*2
GLUCOSE SERPL-MCNC: 103 MG/DL (ref 70–99)
GLUCOSE SERPL-MCNC: 96 MG/DL (ref 70–99)
GLUCOSE SERPL-MCNC: 99 MG/DL (ref 70–99)
GLUCOSE UR STRIP.AUTO-MCNC: NEGATIVE MG/DL
HCT VFR BLDCO AUTO: 39.4 %
HCT VFR BLDCO AUTO: 40.3 %
HDLC SERPL-MCNC: 66 MG/DL
HDLC SERPL: 3.4 {RATIO}
HGB BLD-MCNC: 13.5 G/DL
HGB BLD-MCNC: 13.5 G/DL
HGB UR QL STRIP.AUTO: NEGATIVE
HYALINE CASTS #/AREA URNS LPF: ABNORMAL /LPF
IMM GRANULOCYTES # BLD AUTO: 0.02 K/UL (ref 0–0.08)
IMM GRANULOCYTES NFR BLD AUTO: 0.3 %
INR PPP: 0.9 INR
KETONES UR STRIP.AUTO-MCNC: NEGATIVE MG/DL
LDLC SERPL CALC-MCNC: 143 MG/DL
LEUKOCYTE ESTERASE UR QL STRIP.AUTO: NEGATIVE
LYMPHOCYTES # BLD: 2.28 K/UL (ref 1.2–3.5)
LYMPHOCYTES NFR BLD: 29.6 %
MAGNESIUM SERPL-MCNC: 2.2 MG/DL (ref 1.8–2.5)
MCH RBC QN AUTO: 32.9 PG (ref 28–33.2)
MCH RBC QN AUTO: 33.4 PG (ref 28–33.2)
MCHC RBC AUTO-ENTMCNC: 33.5 G/DL (ref 32.2–35.5)
MCHC RBC AUTO-ENTMCNC: 34.3 G/DL (ref 32.2–35.5)
MCV RBC AUTO: 97.5 FL (ref 83–98)
MCV RBC AUTO: 98.3 FL (ref 83–98)
MONOCYTES # BLD: 0.7 K/UL (ref 0.28–0.8)
MONOCYTES NFR BLD: 9.1 %
NEUTROPHILS # BLD: 4.16 K/UL (ref 1.7–7)
NEUTS SEG NFR BLD: 53.9 %
NITRITE UR QL STRIP.AUTO: NEGATIVE
NONHDLC SERPL-MCNC: 157 MG/DL
NRBC BLD-RTO: 0 %
OPIATES UR QL SCN: NORMAL
P AXIS: 63
PCP UR QL SCN: NORMAL
PDW BLD AUTO: 10.7 FL (ref 9.4–12.3)
PDW BLD AUTO: 10.9 FL (ref 9.4–12.3)
PH UR STRIP.AUTO: 7 [PH]
PLATELET # BLD AUTO: 306 K/UL
PLATELET # BLD AUTO: 342 K/UL
POTASSIUM SERPL-SCNC: 3.6 MEQ/L (ref 3.6–5.1)
POTASSIUM SERPL-SCNC: 3.8 MEQ/L (ref 3.6–5.1)
POTASSIUM SERPL-SCNC: 4 MEQ/L (ref 3.6–5.1)
PR INTERVAL: 134
PROT SERPL-MCNC: 6.1 G/DL (ref 6–8.2)
PROT UR QL STRIP.AUTO: NEGATIVE
PROTHROMBIN TIME: 12.2 SEC (ref 12.2–14.5)
QRS DURATION: 76
QT INTERVAL: 388
QTC CALCULATION(BAZETT): 421
R AXIS: 6
RBC # BLD AUTO: 4.04 M/UL (ref 3.93–5.22)
RBC # BLD AUTO: 4.1 M/UL (ref 3.93–5.22)
RBC #/AREA URNS HPF: ABNORMAL /HPF
SODIUM SERPL-SCNC: 140 MEQ/L (ref 136–144)
SODIUM SERPL-SCNC: 140 MEQ/L (ref 136–144)
SODIUM SERPL-SCNC: 141 MEQ/L (ref 136–144)
SP GR UR REFRACT.AUTO: 1.02
SQUAMOUS URNS QL MICRO: 1 /HPF
T WAVE AXIS: 50
TRIGL SERPL-MCNC: 72 MG/DL (ref 30–149)
TROPONIN I SERPL-MCNC: <0.03 NG/ML
UROBILINOGEN UR STRIP-ACNC: 0.2 EU/DL
VENTRICULAR RATE: 71
WBC # BLD AUTO: 7.41 K/UL
WBC # BLD AUTO: 7.71 K/UL
WBC #/AREA URNS HPF: ABNORMAL /HPF

## 2019-03-24 PROCEDURE — 80061 LIPID PANEL: CPT | Performed by: HOSPITALIST

## 2019-03-24 PROCEDURE — 200200 PR NO CHARGE: Performed by: HOSPITALIST

## 2019-03-24 PROCEDURE — 99217 PR OBSERVATION CARE DISCHARGE MANAGEMENT: CPT | Performed by: INTERNAL MEDICINE

## 2019-03-24 PROCEDURE — 70544 MR ANGIOGRAPHY HEAD W/O DYE: CPT | Mod: 59

## 2019-03-24 PROCEDURE — 70551 MRI BRAIN STEM W/O DYE: CPT

## 2019-03-24 PROCEDURE — G0378 HOSPITAL OBSERVATION PER HR: HCPCS

## 2019-03-24 PROCEDURE — 70547 MR ANGIOGRAPHY NECK W/O DYE: CPT

## 2019-03-24 PROCEDURE — 36415 COLL VENOUS BLD VENIPUNCTURE: CPT | Performed by: HOSPITALIST

## 2019-03-24 PROCEDURE — 63700000 HC SELF-ADMINISTRABLE DRUG: Performed by: HOSPITALIST

## 2019-03-24 PROCEDURE — 85027 COMPLETE CBC AUTOMATED: CPT | Performed by: HOSPITALIST

## 2019-03-24 PROCEDURE — 80048 BASIC METABOLIC PNL TOTAL CA: CPT | Performed by: HOSPITALIST

## 2019-03-24 PROCEDURE — 93010 ELECTROCARDIOGRAM REPORT: CPT | Performed by: INTERNAL MEDICINE

## 2019-03-24 PROCEDURE — 63700000 HC SELF-ADMINISTRABLE DRUG: Performed by: INTERNAL MEDICINE

## 2019-03-24 RX ORDER — GABAPENTIN 300 MG/1
300 CAPSULE ORAL 3 TIMES DAILY
Status: DISCONTINUED | OUTPATIENT
Start: 2019-03-24 | End: 2019-03-24

## 2019-03-24 RX ORDER — IBUPROFEN 200 MG
16-32 TABLET ORAL AS NEEDED
Status: DISCONTINUED | OUTPATIENT
Start: 2019-03-24 | End: 2019-03-24 | Stop reason: HOSPADM

## 2019-03-24 RX ORDER — ALBUTEROL SULFATE 0.83 MG/ML
2.5 SOLUTION RESPIRATORY (INHALATION) EVERY 6 HOURS PRN
Status: DISCONTINUED | OUTPATIENT
Start: 2019-03-24 | End: 2019-03-24 | Stop reason: HOSPADM

## 2019-03-24 RX ORDER — DEXTROSE 40 %
15-30 GEL (GRAM) ORAL AS NEEDED
Status: DISCONTINUED | OUTPATIENT
Start: 2019-03-24 | End: 2019-03-24 | Stop reason: HOSPADM

## 2019-03-24 RX ORDER — GABAPENTIN 300 MG/1
600 CAPSULE ORAL 3 TIMES DAILY
Status: DISCONTINUED | OUTPATIENT
Start: 2019-03-24 | End: 2019-03-24 | Stop reason: HOSPADM

## 2019-03-24 RX ORDER — POTASSIUM CHLORIDE 750 MG/1
40 TABLET, FILM COATED, EXTENDED RELEASE ORAL ONCE
Status: COMPLETED | OUTPATIENT
Start: 2019-03-24 | End: 2019-03-24

## 2019-03-24 RX ORDER — PANTOPRAZOLE SODIUM 40 MG/1
40 TABLET, DELAYED RELEASE ORAL DAILY
Status: DISCONTINUED | OUTPATIENT
Start: 2019-03-24 | End: 2019-03-24 | Stop reason: HOSPADM

## 2019-03-24 RX ORDER — HYDROCHLOROTHIAZIDE 12.5 MG/1
12.5 TABLET ORAL DAILY
Status: DISCONTINUED | OUTPATIENT
Start: 2019-03-24 | End: 2019-03-24 | Stop reason: HOSPADM

## 2019-03-24 RX ORDER — DEXTROSE 50 % IN WATER (D50W) INTRAVENOUS SYRINGE
25 AS NEEDED
Status: DISCONTINUED | OUTPATIENT
Start: 2019-03-24 | End: 2019-03-24 | Stop reason: HOSPADM

## 2019-03-24 RX ADMIN — POTASSIUM CHLORIDE 40 MEQ: 750 TABLET, FILM COATED, EXTENDED RELEASE ORAL at 12:46

## 2019-03-24 RX ADMIN — GABAPENTIN 600 MG: 300 CAPSULE ORAL at 14:47

## 2019-03-24 RX ADMIN — PANTOPRAZOLE SODIUM 40 MG: 40 TABLET, DELAYED RELEASE ORAL at 09:07

## 2019-03-24 RX ADMIN — GABAPENTIN 300 MG: 300 CAPSULE ORAL at 09:07

## 2019-03-24 RX ADMIN — HYDROCHLOROTHIAZIDE 12.5 MG: 12.5 TABLET ORAL at 09:07

## 2019-03-24 ASSESSMENT — COGNITIVE AND FUNCTIONAL STATUS - GENERAL
MOVING TO AND FROM BED TO CHAIR: 4 - NONE
DRESSING REGULAR UPPER BODY CLOTHING: 4 - NONE
DRESSING REGULAR LOWER BODY CLOTHING: 4 - NONE
CLIMB 3 TO 5 STEPS WITH RAILING: 4 - NONE
HELP NEEDED FOR BATHING: 4 - NONE
STANDING UP FROM CHAIR USING ARMS: 4 - NONE
WALKING IN HOSPITAL ROOM: 4 - NONE
EATING MEALS: 4 - NONE
HELP NEEDED FOR PERSONAL GROOMING: 4 - NONE
TOILETING: 4 - NONE

## 2019-03-24 NOTE — ED PROVIDER NOTES
"HPI     Chief Complaint   Patient presents with   • Slurred Speech       52 y/o F w/ history of cervicogenic headaches, cervical radiculopathy presents to ED for concern of episode of slow slurred speech which occurred about 5 hours pta (1830), this episode lasted about one hour before resolving spontaneously. Pt was at dinner w/ family when she suddenly began to have \"slurred and slowed\" speech, she was able to identify and use correct wording during this and at this time states her speech has returned to normal. Pt denies any other associated sx's, recent trauma, or family history of blood clots/CVA.             History provided by:  Patient  Other   Location:  Speech  Quality:  \"slurred, slowed\"  Severity:  Moderate  Onset quality:  Sudden  Duration:  1 hour  Timing:  Constant  Progression:  Resolved  Chronicity:  New  Relieved by:  Nothing  Worsened by:  Nothing  Associated symptoms: no abdominal pain, no chest pain, no fever, no nausea, no shortness of breath and no vomiting         Patient History     Past Medical History:   Diagnosis Date   • Allergic rhinitis    • Cervical myelopathy (CMS/HCC) (HCC)    • Cervical radiculopathy    • Cervicogenic headache    • Closed fracture of sesamoid bone of foot    • Cricopharyngeal dysphagia    • Fibroids    • Food allergy    • GERD (gastroesophageal reflux disease)    • Lipid disorder    • Nephrolithiasis    • Osteoporosis        Past Surgical History:   Procedure Laterality Date   • ANTERIOR FUSION CERVICAL SPINE      c6-7   • CRICOPHARYNGEAL MYOTOMY     • KNEE SURGERY     • LUMBAR FUSION      L5-S1   • MYOMECTOMY     • THROAT SURGERY         Family History   Problem Relation Age of Onset   • Hyperlipidemia Mother    • Hypertension Mother    • Pulmonary fibrosis Father    • Colon cancer Maternal Grandfather    • Breast cancer Mother's Sister    • Ovarian cancer Father's Sister        Social History   Substance Use Topics   • Smoking status: Never Smoker   • Smokeless " "tobacco: Never Used   • Alcohol use No       Systems Reviewed from Nursing Triage:          Review of Systems     Review of Systems   Constitutional: Negative for fever.   Respiratory: Negative for shortness of breath.    Cardiovascular: Negative for chest pain.   Gastrointestinal: Negative for abdominal pain, nausea and vomiting.   Neurological: Positive for speech difficulty.   All other systems reviewed and are negative.       Physical Exam     ED Triage Vitals [03/23/19 2246]   Temp Heart Rate Resp BP SpO2   36.1 °C (97 °F) 83 18 (!) 184/99 99 %      Temp Source Heart Rate Source Patient Position BP Location FiO2 (%) (Set)   Oral -- Sitting Right upper arm --                     Patient Vitals for the past 24 hrs:   BP Temp Temp src Pulse Resp SpO2 Height Weight   03/23/19 2246 (!) 184/99 36.1 °C (97 °F) Oral 83 18 99 % 1.448 m (4' 9\") 48.1 kg (106 lb)           Physical Exam   Constitutional: She is oriented to person, place, and time. She appears well-developed. No distress.   HENT:   Head: Normocephalic and atraumatic.   Eyes: Conjunctivae and EOM are normal. Pupils are equal, round, and reactive to light. No scleral icterus.   Neck: Neck supple.   Cardiovascular: Normal rate and regular rhythm.    Pulmonary/Chest: Effort normal and breath sounds normal. No respiratory distress.   Abdominal: Soft. There is no tenderness.   Neurological: She is alert and oriented to person, place, and time. She has normal strength. She displays normal reflexes. No cranial nerve deficit or sensory deficit. She exhibits normal muscle tone. Coordination normal.   Normal cerebellar testing  Cranial nerves 2-12 intact   Skin: Skin is warm and dry. She is not diaphoretic.   Psychiatric: She has a normal mood and affect.   Nursing note and vitals reviewed.           ECG 12 lead  Date/Time: 3/23/2019 10:58 PM  Performed by: EL BROWN  Authorized by: EL BROWN     ECG reviewed by ED Physician in the absence " of a cardiologist: yes    Interpretation:     Interpretation: non-specific    Rate:     ECG rate:  71    ECG rate assessment: normal    Rhythm:     Rhythm: sinus rhythm          ED Course & MDM     Labs Reviewed   CBC AND DIFFERENTIAL    Narrative:     The following orders were created for panel order CBC and differential.  Procedure                               Abnormality         Status                     ---------                               -----------         ------                     CBC[[id number]]                                                                          Diff Count[[id number]]                                                                     Please view results for these tests on the individual orders.   PROTIME-INR   COMPREHENSIVE METABOLIC PANEL   CK, TOTAL (NO REFLEX)   MAGNESIUM   TROPONIN I   URINALYSIS REFLEX CULTURE    Narrative:     The following orders were created for panel order Urinalysis w/ reflex culture.  Procedure                               Abnormality         Status                     ---------                               -----------         ------                     UA Reflex to Culture (Mac...[[id number]]                                                   Please view results for these tests on the individual orders.   DRUG SCREEN PANEL, URINE   CBC   DIFF COUNT   UA REFLEX CULTURE (MACROSCOPIC)   POCT GLUCOSE   POCT GLUCOSE (BEAKER)       Result Value    POCT Bedside Glucose 84      POC Test POC         ECG 12 lead         CT HEAD WITHOUT IV CONTRAST    (Results Pending)              CT HEAD WITHOUT IV CONTRAST   ED Interpretation   History: slurred speach      Preliminary Results:           Comparison: MRI brain dated 2/1/2013.        No acute intracranial parenchymal hemorrhage, mass effect, midline shift, or extra-axial fluid collection. Gray-white matter differentiation is maintained. No acute large vascular territorial infarct.      Interpreted by: Keegan Hanson  DO, Mar 24, 2019 12:21 AM      ECG 12 lead         ECG 12 lead    (Results Pending)   ECG 12 lead    (Results Pending)     Results for orders placed or performed during the hospital encounter of 03/23/19   Protime-INR   Result Value Ref Range    PT 12.2 12.2 - 14.5 sec    INR 0.9   INR   Comprehensive metabolic panel   Result Value Ref Range    Sodium 140 136 - 144 mEQ/L    Potassium 4.0 3.6 - 5.1 mEQ/L    Chloride 106 98 - 109 mEQ/L    CO2 23 22 - 32 mEQ/L    BUN 17 8 - 20 mg/dL    Creatinine 0.7 0.6 - 1.1 mg/dL    Glucose 103 (H) 70 - 99 mg/dL    Calcium 9.4 8.9 - 10.3 mg/dL    AST (SGOT) 22 15 - 41 IU/L    ALT (SGPT) 15 11 - 54 IU/L    Alkaline Phosphatase 76 35 - 126 IU/L    Total Protein 6.1 6.0 - 8.2 g/dL    Albumin 3.9 3.4 - 5.0 g/dL    Bilirubin, Total 0.3 0.3 - 1.2 mg/dL    eGFR >60.0 >=60.0 mL/min/1.73m*2    Anion Gap 11 3 - 15 mEQ/L   CK, Total   Result Value Ref Range    Total CK 78 15 - 200 U/L   Magnesium   Result Value Ref Range    Magnesium 2.2 1.8 - 2.5 mg/dL   Troponin I   Result Value Ref Range    Troponin I <0.03 <0.05 ng/mL   Urine drug screen (UDS)   Result Value Ref Range    PCP Scrn, Ur None Detected None Detected    Benzodiazepine Ur Qual None Detected None Detected    Cocaine Screen, Urine None Detected None Detected    Amphetamine+Methamphetamine Screen, Ur None Detected None Detected    Cannabinoid Screen, Urine None Detected None Detected    Opiate Scrn, Ur None Detected None Detected    Barbiturate Screen, Ur None Detected None Detected   CBC   Result Value Ref Range    WBC 7.71 3.80 - 10.50 K/uL    RBC 4.10 3.93 - 5.22 M/uL    Hemoglobin 13.5 11.8 - 15.7 g/dL    Hematocrit 40.3 35.0 - 45.0 %    MCV 98.3 (H) 83.0 - 98.0 fL    MCH 32.9 28.0 - 33.2 pg    MCHC 33.5 32.2 - 35.5 g/dL    RDW 13.3 11.7 - 14.4 %    Platelets 342 150 - 369 K/uL    MPV 10.9 9.4 - 12.3 fL   Diff Count   Result Value Ref Range    Differential Type Auto     nRBC 0.0 <=0.0 %    Immature Granulocytes 0.3 %     "Neutrophils 53.9 %    Lymphocytes 29.6 %    Monocytes 9.1 %    Eosinophils 6.1 %    Basophils 1.0 %    Immature Granulocytes, Absolute 0.02 0.00 - 0.08 K/uL    Neutrophils, Absolute 4.16 1.70 - 7.00 K/uL    Lymphocytes, Absolute 2.28 1.20 - 3.50 K/uL    Monocytes, Absolute 0.70 0.28 - 0.80 K/uL    Eosinophils, Absolute 0.47 (H) 0.04 - 0.36 K/uL    Basophils, Absolute 0.08 0.01 - 0.10 K/uL   UA Reflex to Culture (Macroscopic)   Result Value Ref Range    Color, Urine Yellow Yellow    Clarity, Urine Turbid (A) Clear    Specific Gravity, Urine 1.020 1.002 - 1.030    pH, Urine 7.0 4.5 - 8.0    Leukocyte Esterase Negative Negative    Nitrite, Urine Negative Negative    Protein, Urine Negative Negative    Glucose, Urine Negative Negative mg/dL    Ketones, Urine Negative Negative mg/dL    Urobilinogen, Urine 0.2 <2.0 EU/dL EU/dL    Bilirubin, Urine Negative Negative mg/dL    Blood, Urine Negative Negative   UA Microscopic   Result Value Ref Range    RBC, Urine 0 TO 4 0 TO 4 /HPF    WBC, Urine 0 TO 3 0 TO 3 /HPF    Squamous Epithelial +1 (A) None Seen /hpf    Hyaline Cast None Seen None Seen /lpf    Bacteria, Urine Rare (A) None Seen /HPF   ECG 12 lead   Result Value Ref Range    Ventricular rate 71     Atrial rate 71     MA Interval 134     QRS duration 76     QT Interval 388     QTC Calculation(Bazett) 421     P Axis 63     R Axis 6     T Wave Axis 50    POCT Glucose   Result Value Ref Range    POCT Bedside Glucose 84 70 - 99 mg/dL    POC Test POC          MDM  Scribe Attestation  By signing my name below, I, Cheryl Germania, attest that this documentation has been prepared under the direction and in the presence of Dr. Faustin.    3/23/2019 11:31 PM    Iblade DO personally dictated hpi/exam/mdm to scribe and agree w. above           ED Course as of Mar 24 0129   Sun Mar 24, 2019   0012 53-year-old female who is otherwise very healthy presents today with an episode of \"slurred speech\", and \"feeling foggy\" which " "lasted for approximately an hour to an hour and a half.  Patient states his been no trauma, has no other weakness, has a completely normal neurologic exam here.  Patient states that \"she felt high\".  Cerebellar is intact here speech is intact there is no motor or sensory deficits, cranial nerves II through XII are also intact.  Plan to get CAT scan and TIA workup, but based on patient's symptoms, her ABCD 2 score is 4  [RL]      ED Course User Index  [RL] Elizabeth Faustin DO         Clinical Impressions as of Mar 24 0129   TIA (transient ischemic attack)        Cheryl Solo  03/23/19 2332       Cheryl Solo  03/23/19 2344       Elizabeth Faustin DO  03/24/19 0129    "

## 2019-03-24 NOTE — ASSESSMENT & PLAN NOTE
Continue gabapentin 600 mg TID  Recommend speaking with PCP for alternative radiculopathy therapy eg TCA

## 2019-03-24 NOTE — H&P
"   Hospital Medicine Service -  History & Physical        CHIEF COMPLAINT   Slurred/slowed speech   HISTORY OF PRESENT ILLNESS      Rajwinder Wynne is a 53 y.o. female with a past medical history of HTN, borderline HLD, cervical radiculopathy, cervical spinal fusion, and GERD who presents with a chief complaint of slow/slurred speech. The patient is accompanied by her significant other who helps provide elements of this HPI. The patient states that she was in her usual state of health until earlier this evening, when she went out to an italian restaurant for dinner with her significant other. They ate pizza and pasta. She denies drinking any alcohol. She states that she during dinner she began having slurred and \"slowed\" speech. She denies any problems with word finding, but states that she felt as if she were \"high\" and perhaps dizzy at one point but without any vertiginous symptoms. She denies any syncope or presyncope. She denies any exacerbating or relieving factors and tells me that her symptoms resolved within 1.5 hours of starting on it's own. She denies any history of tobacco abuse, alcohol abuse, or the use of any illicits. She presented to the ED for further evaluation. At the time of my evaluation she denies new focal neurologic deficits.     PAST MEDICAL AND SURGICAL HISTORY      Past Medical History:   Diagnosis Date   • Allergic rhinitis    • Cervical myelopathy (CMS/HCC) (HCC)    • Cervical radiculopathy    • Cervicogenic headache    • Closed fracture of sesamoid bone of foot    • Cricopharyngeal dysphagia    • Fibroids    • Food allergy    • GERD (gastroesophageal reflux disease)    • Lipid disorder    • Nephrolithiasis    • Osteoporosis        Past Surgical History:   Procedure Laterality Date   • ANTERIOR FUSION CERVICAL SPINE      c6-7   • CRICOPHARYNGEAL MYOTOMY     • KNEE SURGERY     • LUMBAR FUSION      L5-S1   • MYOMECTOMY     • THROAT SURGERY         PCP: Maya Tesfaye MD    MEDICATIONS "       Current Facility-Administered Medications:   •  albuterol nebulizer solution 2.5 mg, 2.5 mg, nebulization, q6h PRN, Derick Webster MD  •  glucose chewable tablet 16-32 g of dextrose, 16-32 g of dextrose, oral, PRN **OR** dextrose 40 % oral gel 15-30 g of dextrose, 15-30 g of dextrose, oral, PRN **OR** glucagon (GLUCAGEN) injection 1 mg, 1 mg, intramuscular, PRN **OR** dextrose in water injection 12.5 g, 25 mL, intravenous, PRN, Derick Webster MD  •  gabapentin (NEURONTIN) capsule 300 mg, 300 mg, oral, TID, Derick Webster MD  •  hydrochlorothiazide (HYDRODIURIL) tablet 12.5 mg, 12.5 mg, oral, Daily, Derick Webster MD  •  pantoprazole (PROTONIX) tablet,delayed release (DR/EC) 40 mg, 40 mg, oral, Daily, Derick Webster MD         ALLERGIES      Shellfish containing products; Adhesive; Mays; Banana; Berry flavor; Erythromycin; Erythromycin base; Iodine; Latex; Levonorgestrel-ethinyl estrad; Naprelan; Naproxen; Naproxen sodium; Penicillin g; Penicillin v potassium; Penicillins; Pineapple; Pumpkin seed; and Shellfish derived    FAMILY HISTORY      Family History   Problem Relation Age of Onset   • Hyperlipidemia Mother    • Hypertension Mother    • Pulmonary fibrosis Father    • Colon cancer Maternal Grandfather    • Breast cancer Mother's Sister    • Ovarian cancer Father's Sister        SOCIAL HISTORY      Social History     Social History   • Marital status:      Spouse name: N/A   • Number of children: N/A   • Years of education: N/A     Social History Main Topics   • Smoking status: Never Smoker   • Smokeless tobacco: Never Used   • Alcohol use No   • Drug use: No   • Sexual activity: Not Asked     Other Topics Concern   • None     Social History Narrative   • None       REVIEW OF SYSTEMS        No rhinorrhea, sore throat, otalgia, postnasal drip.  No visual changes, headaches, dizziness, focal weakness.  No dysphagia.  No cough or shortness of breath or dyspnea on exertion.  No chest pain,  palpitations, leg edema, orthopnea, paroxysmal nocturnal dyspnea.  No hemoptysis.  No melena or bright red blood per rectum.  No abdominal pain, nausea or vomiting, diarrhea or constipation.  No dysuria or hematuria.  No fever, chills, rigors.  No rashes. No depression or anxiety. No suicidal thoughts or homicidal thoughts.       PHYSICAL EXAMINATION      Temp:  [36.1 °C (97 °F)] 36.1 °C (97 °F)  Heart Rate:  [70-83] 70  Resp:  [16-18] 16  BP: (147-184)/(85-99) 147/86  Body mass index is 22.94 kg/m².    Physical Exam   Constitutional: She is oriented to person, place, and time. She appears well-developed and well-nourished. No distress.   HENT:   Head: Normocephalic and atraumatic.   Right Ear: External ear normal.   Left Ear: External ear normal.   Nose: Nose normal.   Mouth/Throat: Oropharynx is clear and moist. No oropharyngeal exudate.   Eyes: Conjunctivae and EOM are normal. Pupils are equal, round, and reactive to light. Right eye exhibits no discharge. Left eye exhibits no discharge. No scleral icterus.   Neck: Normal range of motion. Neck supple. No JVD present.   Cardiovascular: Normal rate, regular rhythm, normal heart sounds and intact distal pulses.  Exam reveals no gallop and no friction rub.    No murmur heard.  Pulmonary/Chest: Effort normal and breath sounds normal. No stridor. No respiratory distress. She has no wheezes. She has no rales. She exhibits no tenderness.   Abdominal: Soft. Bowel sounds are normal. She exhibits no distension and no mass. There is no tenderness.   Musculoskeletal: Normal range of motion. She exhibits no edema, tenderness or deformity.   Neurological: She is alert and oriented to person, place, and time. She has normal strength. She displays no tremor and normal reflexes. No cranial nerve deficit or sensory deficit. She exhibits normal muscle tone. Coordination normal.   CN II - XII grossly intact.    No facial droop    Tongue and uvula midline.    PERRLA EOMI visual fields  full    Speech is clear with normal franklin and tone. She is fluent and conversant.    No pronator drift.    Strength is full in all extremities.    Sensation to light touch is normal throughout.    FTN intact. Heel to shin intact.       Skin: She is not diaphoretic.   Nursing note and vitals reviewed.      LABS / IMAGING / EKG        Labs  Recent Results (from the past 24 hour(s))   ECG 12 lead    Collection Time: 03/23/19 10:57 PM   Result Value Ref Range    Ventricular rate 71     Atrial rate 71     MO Interval 134     QRS duration 76     QT Interval 388     QTC Calculation(Bazett) 421     P Axis 63     R Axis 6     T Wave Axis 50    POCT Glucose    Collection Time: 03/23/19 10:59 PM   Result Value Ref Range    POCT Bedside Glucose 84 70 - 99 mg/dL    POC Test POC    Protime-INR    Collection Time: 03/23/19 11:41 PM   Result Value Ref Range    PT 12.2 12.2 - 14.5 sec    INR 0.9   INR   Comprehensive metabolic panel    Collection Time: 03/23/19 11:41 PM   Result Value Ref Range    Sodium 140 136 - 144 mEQ/L    Potassium 4.0 3.6 - 5.1 mEQ/L    Chloride 106 98 - 109 mEQ/L    CO2 23 22 - 32 mEQ/L    BUN 17 8 - 20 mg/dL    Creatinine 0.7 0.6 - 1.1 mg/dL    Glucose 103 (H) 70 - 99 mg/dL    Calcium 9.4 8.9 - 10.3 mg/dL    AST (SGOT) 22 15 - 41 IU/L    ALT (SGPT) 15 11 - 54 IU/L    Alkaline Phosphatase 76 35 - 126 IU/L    Total Protein 6.1 6.0 - 8.2 g/dL    Albumin 3.9 3.4 - 5.0 g/dL    Bilirubin, Total 0.3 0.3 - 1.2 mg/dL    eGFR >60.0 >=60.0 mL/min/1.73m*2    Anion Gap 11 3 - 15 mEQ/L   CK, Total    Collection Time: 03/23/19 11:41 PM   Result Value Ref Range    Total CK 78 15 - 200 U/L   Magnesium    Collection Time: 03/23/19 11:41 PM   Result Value Ref Range    Magnesium 2.2 1.8 - 2.5 mg/dL   Troponin I    Collection Time: 03/23/19 11:41 PM   Result Value Ref Range    Troponin I <0.03 <0.05 ng/mL   CBC    Collection Time: 03/23/19 11:41 PM   Result Value Ref Range    WBC 7.71 3.80 - 10.50 K/uL    RBC 4.10 3.93 - 5.22  M/uL    Hemoglobin 13.5 11.8 - 15.7 g/dL    Hematocrit 40.3 35.0 - 45.0 %    MCV 98.3 (H) 83.0 - 98.0 fL    MCH 32.9 28.0 - 33.2 pg    MCHC 33.5 32.2 - 35.5 g/dL    RDW 13.3 11.7 - 14.4 %    Platelets 342 150 - 369 K/uL    MPV 10.9 9.4 - 12.3 fL   Diff Count    Collection Time: 03/23/19 11:41 PM   Result Value Ref Range    Differential Type Auto     nRBC 0.0 <=0.0 %    Immature Granulocytes 0.3 %    Neutrophils 53.9 %    Lymphocytes 29.6 %    Monocytes 9.1 %    Eosinophils 6.1 %    Basophils 1.0 %    Immature Granulocytes, Absolute 0.02 0.00 - 0.08 K/uL    Neutrophils, Absolute 4.16 1.70 - 7.00 K/uL    Lymphocytes, Absolute 2.28 1.20 - 3.50 K/uL    Monocytes, Absolute 0.70 0.28 - 0.80 K/uL    Eosinophils, Absolute 0.47 (H) 0.04 - 0.36 K/uL    Basophils, Absolute 0.08 0.01 - 0.10 K/uL   Urine drug screen (UDS)    Collection Time: 03/23/19 11:43 PM   Result Value Ref Range    PCP Scrn, Ur None Detected None Detected    Benzodiazepine Ur Qual None Detected None Detected    Cocaine Screen, Urine None Detected None Detected    Amphetamine+Methamphetamine Screen, Ur None Detected None Detected    Cannabinoid Screen, Urine None Detected None Detected    Opiate Scrn, Ur None Detected None Detected    Barbiturate Screen, Ur None Detected None Detected   UA Reflex to Culture (Macroscopic)    Collection Time: 03/23/19 11:43 PM   Result Value Ref Range    Color, Urine Yellow Yellow    Clarity, Urine Turbid (A) Clear    Specific Gravity, Urine 1.020 1.002 - 1.030    pH, Urine 7.0 4.5 - 8.0    Leukocyte Esterase Negative Negative    Nitrite, Urine Negative Negative    Protein, Urine Negative Negative    Glucose, Urine Negative Negative mg/dL    Ketones, Urine Negative Negative mg/dL    Urobilinogen, Urine 0.2 <2.0 EU/dL EU/dL    Bilirubin, Urine Negative Negative mg/dL    Blood, Urine Negative Negative   UA Microscopic    Collection Time: 03/23/19 11:43 PM   Result Value Ref Range    RBC, Urine 0 TO 4 0 TO 4 /HPF    WBC, Urine 0  TO 3 0 TO 3 /HPF    Squamous Epithelial +1 (A) None Seen /hpf    Hyaline Cast None Seen None Seen /lpf    Bacteria, Urine Rare (A) None Seen /HPF     I have personally reviewed the patient's labs as listed above.    Imaging  I have personally reviewed the patient's imaging studies. CT head without any acute intracranial abnormalities.    ECG/Telemetry  I have personally reviewed the patient's EKG. NSR rate 71 QTc 421    ASSESSMENT AND PLAN           * Slurred speech   Assessment & Plan    Observation on telemetry.    Without any focal deficits at the time of my evaluation, however concerning for TIA.    Consult with neurology.    MRI of the head and neck ordered. Please follow up.     Follow up A1c and lipid panel for further risk factor modification purposes.        Essential hypertension   Assessment & Plan    Continue HCTZ 12.5 mg daily.    Monitor blood pressures.        Cervical radiculopathy   Assessment & Plan    Continue gabapentin 300 mg TID.              VTE Assessment: Padua VTE Score: 0  VTE Prophylaxis Plan: ambulation  Code Status: Full Code  Estimated Discharge Date: 3/25/2019  Disposition Planning: pending evaluation for TIA; home     Derick Webster MD  3/24/2019

## 2019-03-24 NOTE — PLAN OF CARE
Problem: Patient Care Overview  Goal: Plan of Care Review  Outcome: Ongoing (interventions implemented as appropriate)   03/24/19 0401   Coping/Psychosocial   Plan Of Care Reviewed With patient;friend   Plan of Care Review   Progress improving     Goal: Discharge Needs Assessment  Outcome: Ongoing (interventions implemented as appropriate)   03/24/19 0401   DC Needs Assessment   Concerns To Be Addressed no discharge needs identified   Readmission Within The Last 30 Days no previous admission in last 30 days   Provider Choice List(s) Given no   Anticipated Discharge Disposition home without services   Equipment Needed After Discharge none   Current Discharge Risk lives alone   Current Health   Anticipated Changes Related to Illness none   Activity/Self Care ROS   Equipment Currently Used at Home none     Goal: Interprofessional Rounds/Family Conf  Outcome: Ongoing (interventions implemented as appropriate)   03/24/19 0401   Interdisciplinary Rounds/Family Conf   Participants nursing;patient

## 2019-03-24 NOTE — DISCHARGE SUMMARY
Hospital Medicine Service -  Inpatient Discharge Summary        BRIEF OVERVIEW   Admitting Provider: Derick Webster MD  Attending Provider: Cullen Barajas MD Attending phys phone: [phone]    PCP: Maya Tesfaye [phone]    Admission Date: 3/23/2019  Discharge Date: 3/24/2019     DISCHARGE DIAGNOSES      Primary Discharge Diagnosis  Slurred speech    Secondary Discharge Diagnoses  Active Hospital Problems    Diagnosis Date Noted   • Slurred speech 03/24/2019     Priority: High   • Cervical radiculopathy 05/15/2014     Priority: Medium   • Essential hypertension 03/24/2019      Resolved Hospital Problems    Diagnosis Date Noted Date Resolved   No resolved problems to display.       Problem List on Day of Discharge  * Slurred speech   Assessment & Plan    - DDx: TIA vs medication adverse effect (patient reports taking extra gabapentin)  - CT head negative for acute pathology  - MRI/MRA for TIA evaluation  - 10 year ASCVD risk low, would not need asa/statin if TIA eval negative given suspected medication effect  - recommend speaking with PCP for alternative radiculopathy therapy eg TCA        Cervical radiculopathy   Assessment & Plan    Continue gabapentin 600 mg TID  Recommend speaking with PCP for alternative radiculopathy therapy eg TCA        Essential hypertension   Assessment & Plan    Continue HCTZ 12.5 mg daily.    Monitor blood pressures.          SUMMARY OF HOSPITALIZATION      Presenting Problem/History of Present Illness  Slurred speech    This is a 53 y.o. year-old female admitted on 3/23/2019 with TIA (transient ischemic attack) [G45.9]  Slurred speech [R47.81].    Hospital Course  53 y.o. female with a past medical history of HTN, borderline HLD, cervical radiculopathy, cervical spinal fusion, and GERD who presents with a chief complaint of slow/slurred speech. The patient states that she was in her usual state of health until earlier this evening, when she went out to an italian  "restaurant for dinner with her significant other. They ate pizza and pasta. She denies drinking any alcohol. She states that she during dinner she began having slurred and \"slowed\" speech. She denies any problems with word finding, but states that she felt as if she were \"high\" and perhaps dizzy at one point but without any vertiginous symptoms. She denies any syncope or presyncope. She denies any exacerbating or relieving factors and tells me that her symptoms resolved within 1.5 hours of starting on it's own. She does report taking an extra dose of Gabapentin earlier for neuropathy.     On evaluation, CT head was negative for acute pathology. UA was negative for UTI. Given symptoms, evaluation for TIA performed with MRI/MRA head/neck, which were negative for stroke or significant vascular lesions. As such, her symptoms were thought to be potentially secondary to adverse effects from her additional Gabapentin. She was advised to follow with her primary to discuss other therapies for neuropathy including TCAs. Given alternative diagnosis and low 10 year ASCVD risk score, the decision was made in conjunction with the patient to not initiate aspirin and statin for primary prevention. This may be revisited as an outpatient.     MRI did reveal a small area which potentially represents a cavernoma. This likely did not cause her symptoms. Recommend that repeat MRI brain with contrast in 3 months.    Exam on Day of Discharge  Physical Exam   General:  Awake, alert.  No acute distress.  HEENT:  PERRL/EOMI; Sclerae anicteric.  Moist mucus membranes.   Neck: Supple.  Lungs:  Clear to ascultation bilaterally.  No wheezes, rales, or ronchi.  Cardiovascular:  Regular rate and rhythm.  No murmurs.  Abdomen:  Soft, non tender, non distended.  Positive bowel sounds.  Extremities:  No edema bilaterally.  Neuro: Non-focal.  Walking and moving all ext.  Psych: AAOx3; cooperative    Consults During Admission  IP CONSULT TO " NEUROLOGY    DISCHARGE MEDICATIONS        Medication List      CONTINUE taking these medications    albuterol HFA 90 mcg/actuation inhaler  Commonly known as:  VENTOLIN HFA  Inhale 2 puffs every 6 (six) hours as needed for wheezing.     b complex vitamins tablet  Take 400 mg by mouth daily.     CENTRUM ORAL  Take by mouth.     cetirizine 10 mg tablet  Commonly known as:  ZyrTEC  Take 1 tablet by mouth daily.     estradiol 0.0375 mg/24 hr  Commonly known as:  VIVELLE-DOT  0.0375 patches.     fluticasone propionate 50 mcg/actuation nasal spray  Commonly known as:  FLONASE  SPRAY 2 SPRAYS INTO EACH NOSTRIL EVERY DAY     gabapentin 300 mg capsule  Commonly known as:  NEURONTIN  Take 600 mg by mouth 3 (three) times a day.     hydrochlorothiazide 25 mg tablet  Commonly known as:  HYDRODIURIL  Take 0.5 tablets (12.5 mg total) by mouth daily.     KLOR-CON M10 10 mEq CR tablet  Generic drug:  potassium chloride  TAKE 1 TABLET EVERY DAY     MOBIC 7.5 mg tablet  Generic drug:  meloxicam  Take 7.5 mg by mouth daily.     mv-min-FA-Gq-He-iqkanqq-lutein 0.4-162-18 mg tablet  Take 1 tablet by mouth daily.     pantoprazole 40 mg EC tablet  Commonly known as:  PROTONIX  TAKE 1 TABLET BY MOUTH EVERY DAY     progesterone 100 mg capsule  Commonly known as:  PROMETRIUM  Take 100 mg by mouth once daily.     valACYclovir 500 mg tablet  Commonly known as:  VALTREX     VITAMIN D3 1,000 unit capsule  Generic drug:  cholecalciferol (vitamin D3)  take 1 tablet by oral route  every day                     PROCEDURES / LABS / IMAGING      Operative Procedures  None    Other Procedures  none    Pertinent Labs      Pertinent Imaging  Ct Head Without Iv Contrast    Result Date: 3/24/2019  IMPRESSION: Unremarkable noncontrast CT of the brain.      MRI BRAIN IMPRESSION:  1. No evidence of acute infarction.  2. Small focus of susceptibility in the left parietal lobe without surrounding  edema, which likely represents a cavernoma, however this is  new compared to  2/1/2013 and a late subacute to chronic microhemorrhage is not excluded.  Short-term follow-up MRI brain without and with intravenous contrast in 3 months  is recommended.  3. No high-grade stenosis of the major intracranial arteries.  4. No high-grade stenosis of the cervical carotid or vertebral arteries.    OUTPATIENT  FOLLOW-UP / REFERRALS / PENDING TESTS        Outpatient Follow-Up Appointments            In 3 months Maya Tesafye MD [facility] in [place]          Referrals  No orders of the defined types were placed in this encounter.      Test Results Pending at Discharge  Unresulted Labs     None          Important Issues to Address in Follow-Up  Follow up with PCP  Repeat MRI Brain with contrast in 3 months    DISCHARGE DISPOSITION      Disposition: Home     Code Status At Discharge: Full Code    Physician Order for Life-Sustaining Treatment Document Status      No documents found

## 2019-03-24 NOTE — ASSESSMENT & PLAN NOTE
- DDx: TIA vs medication adverse effect (patient reports taking extra gabapentin)  - CT head negative for acute pathology  - MRI/MRA for TIA evaluation negative  - 10 year ASCVD risk low, would not need asa/statin if TIA eval negative given suspected medication effect  - recommend speaking with PCP for alternative radiculopathy therapy eg TCA  - MRI did reveal 4 mm possible cavernoma, discussed with radiology and patient, unlikely to explain sx, rec repeat MRI brain w/ contrast in 3 months

## 2019-03-25 ENCOUNTER — TELEPHONE (OUTPATIENT)
Dept: CARDIOLOGY | Facility: CLINIC | Age: 54
End: 2019-03-25

## 2019-03-25 ENCOUNTER — TELEPHONE (OUTPATIENT)
Dept: INTERNAL MEDICINE | Facility: CLINIC | Age: 54
End: 2019-03-25

## 2019-03-25 NOTE — TELEPHONE ENCOUNTER
I s/w pt.    Review of Head CT and MRI/MRA with no acute findings.  ? Area of cavernoma.  She is to f/u w/ PMD tomorrow for further evaluation and need for additional imaging.  She was given several names of Neurologist here at [facility].  Will defer to her PMD.

## 2019-03-25 NOTE — TELEPHONE ENCOUNTER
Dr Gaona Patient- Patient calling states she was just in [facility] with slurred speech 3/23-3/24.  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 She would like to discuss with Dr Gaona.    Speech better now    Please call patient at  Thank you

## 2019-03-26 ENCOUNTER — OFFICE VISIT (OUTPATIENT)
Dept: INTERNAL MEDICINE | Facility: CLINIC | Age: 54
End: 2019-03-26
Payer: MEDICARE

## 2019-03-26 VITALS
OXYGEN SATURATION: 98 % | HEART RATE: 69 BPM | HEIGHT: 57 IN | BODY MASS INDEX: 24.51 KG/M2 | SYSTOLIC BLOOD PRESSURE: 100 MMHG | TEMPERATURE: 98.4 F | RESPIRATION RATE: 18 BRPM | DIASTOLIC BLOOD PRESSURE: 60 MMHG | WEIGHT: 113.6 LBS

## 2019-03-26 DIAGNOSIS — M54.12 CERVICAL RADICULOPATHY: ICD-10-CM

## 2019-03-26 DIAGNOSIS — E78.00 HYPERCHOLESTEROLEMIA: ICD-10-CM

## 2019-03-26 DIAGNOSIS — I10 ESSENTIAL HYPERTENSION: ICD-10-CM

## 2019-03-26 DIAGNOSIS — D18.00 CAVERNOMA: ICD-10-CM

## 2019-03-26 DIAGNOSIS — R47.81 SLURRED SPEECH: Primary | ICD-10-CM

## 2019-03-26 PROBLEM — J06.9 UPPER RESPIRATORY TRACT INFECTION: Status: RESOLVED | Noted: 2019-02-08 | Resolved: 2019-03-26

## 2019-03-26 PROCEDURE — 99496 TRANSJ CARE MGMT HIGH F2F 7D: CPT | Performed by: INTERNAL MEDICINE

## 2019-03-26 ASSESSMENT — ENCOUNTER SYMPTOMS
COUGH: 0
NAUSEA: 0
HEMATURIA: 0
NUMBNESS: 0
BACK PAIN: 0
NECK PAIN: 1
HEADACHES: 0
NECK STIFFNESS: 1
ABDOMINAL PAIN: 0
PALPITATIONS: 0
DIARRHEA: 0
DIZZINESS: 0
SHORTNESS OF BREATH: 0
CHILLS: 0
BLOOD IN STOOL: 0
EYE PAIN: 0
DYSURIA: 0
FEVER: 0
CONFUSION: 0
SORE THROAT: 0
FATIGUE: 0
BRUISES/BLEEDS EASILY: 0
VOMITING: 0

## 2019-03-26 NOTE — PATIENT INSTRUCTIONS
PLAN        Consult neurology  See Dr Trujillo at [facility]  Drink lots of fluids  Cont gabapentin  Repeat MRI /MRA  See me in 3 months

## 2019-03-26 NOTE — PROGRESS NOTES
Subjective      Patient ID: Rajwinder Wynne is a 53 y.o. female     HPI     Here for follow up for hospital admission for slurred speech at [facility]  She was admitted on 3/23/19 and discharged on 3/24/19  She had CT brain that was negative  She had MRI/MRA that showed a small area in the left parietal lobe which was new and was thought to be likely a cavernoma  She was advsied to follow up with neurology as outpatient  She takes gabapentin for cervical radiculopathy and has taken extra dosing in  the past without  side effects    Past Medical History:   Diagnosis Date   • Allergic rhinitis    • Cervical myelopathy (CMS/HCC) (HCC)    • Cervical radiculopathy    • Cervicogenic headache    • Closed fracture of sesamoid bone of foot    • Cricopharyngeal dysphagia    • Fibroids    • Food allergy    • GERD (gastroesophageal reflux disease)    • Lipid disorder    • Nephrolithiasis    • Osteoporosis        Past Surgical History:   Procedure Laterality Date   • ANTERIOR FUSION CERVICAL SPINE      c6-7   • CRICOPHARYNGEAL MYOTOMY     • KNEE SURGERY     • LUMBAR FUSION      L5-S1   • MYOMECTOMY     • THROAT SURGERY         Family History   Problem Relation Age of Onset   • Hyperlipidemia Mother    • Hypertension Mother    • Pulmonary fibrosis Father    • Colon cancer Maternal Grandfather    • Breast cancer Mother's Sister    • Ovarian cancer Father's Sister        Social History     Social History   • Marital status:      Spouse name: N/A   • Number of children: N/A   • Years of education: N/A     Occupational History   • Not on file.     Social History Main Topics   • Smoking status: Never Smoker   • Smokeless tobacco: Never Used   • Alcohol use No   • Drug use: No   • Sexual activity: Not on file     Other Topics Concern   • Not on file     Social History Narrative   • No narrative on file       Allergies   Allergen Reactions   • Shellfish Containing Products Anaphylaxis     Pumpkin seeds   • Adhesive      Steri  strips   • [place] Other (see comments)   • Liriano Flavor Other (see comments)   • Erythromycin    • Erythromycin Base Other (see comments)     GI Upset  Other reaction(s): GI side effects   • Latex Other (see comments)     patient not sure   • Banana Other (see comments)     also allergic to almonds, berries   • Iodine Other (see comments)     shell fish allergy   • Levonorgestrel-Ethinyl Estrad    • Naprelan    • Naproxen Other (see comments)     dysphagia throat burning   • Naproxen Sodium      Other reaction(s): lump in throat   • Penicillin G    • Penicillin V Potassium Hives   • Penicillins Hives   • Pineapple Other (see comments)   • Pumpkin Seed      Other reaction(s): throat gets tight   • Shellfish Derived Hives       Current Outpatient Prescriptions   Medication Sig Dispense Refill   • b complex vitamins tablet Take 400 mg by mouth daily.     • cetirizine (ZyrTEC) 10 mg tablet Take 1 tablet by mouth daily.     • cholecalciferol, vitamin D3, (VITAMIN D3) 1,000 unit capsule take 1 tablet by oral route  every day     • estradiol (VIVELLE-DOT) 0.0375 mg/24 hr 0.0375 patches.  3   • fluticasone (FLONASE) 50 mcg/actuation nasal spray SPRAY 2 SPRAYS INTO EACH NOSTRIL EVERY DAY 16 g 3   • folic acid/multivit,iron, (CENTRUM ORAL) Take by mouth.     • gabapentin (NEURONTIN) 300 mg capsule Take 600 mg by mouth 3 (three) times a day.       • hydrochlorothiazide (HYDRODIURIL) 25 mg tablet Take 0.5 tablets (12.5 mg total) by mouth daily. 45 tablet 1   • KLOR-CON M10 10 mEq CR tablet TAKE 1 TABLET EVERY DAY 90 tablet 1   • meloxicam (MOBIC) 7.5 mg tablet Take 7.5 mg by mouth daily.     • mv-min-FA-Bq-Kg-xasekdt-lutein 0.4-162-18 mg tablet Take 1 tablet by mouth daily.     • pantoprazole (PROTONIX) 40 mg EC tablet TAKE 1 TABLET BY MOUTH EVERY DAY 30 tablet 3   • progesterone (PROMETRIUM) 100 mg capsule Take 100 mg by mouth once daily.  3   • valACYclovir (VALTREX) 500 mg tablet        No current facility-administered  "medications for this visit.        /60   Pulse 69   Temp 36.9 °C (98.4 °F) (Oral)   Resp 18   Ht 1.448 m (4' 9\")   Wt 51.5 kg (113 lb 9.6 oz)   SpO2 98%   BMI 24.58 kg/m²       The following have been reviewed and updated as appropriate in this visit:  Allergies  Meds  Problems       Review of Systems   Constitutional: Negative for chills, fatigue and fever.   HENT: Negative for ear pain and sore throat.    Eyes: Negative for pain and visual disturbance.   Respiratory: Negative for cough and shortness of breath.    Cardiovascular: Negative for chest pain, palpitations and leg swelling.   Gastrointestinal: Negative for abdominal pain, blood in stool, diarrhea, nausea and vomiting.   Genitourinary: Negative for dysuria and hematuria.   Musculoskeletal: Positive for neck pain and neck stiffness. Negative for back pain.   Skin: Negative for rash.   Allergic/Immunologic: Negative for environmental allergies.   Neurological: Negative for dizziness, numbness and headaches.   Hematological: Does not bruise/bleed easily.   Psychiatric/Behavioral: Negative for confusion.       Objective       Physical Exam   Constitutional: She is oriented to person, place, and time. She appears well-developed and well-nourished.   HENT:   Head: Normocephalic and atraumatic.   Right Ear: External ear normal.   Left Ear: External ear normal.   Nose: Nose normal.   Mouth/Throat: Oropharynx is clear and moist.   Eyes: Conjunctivae and EOM are normal. Pupils are equal, round, and reactive to light.   Neck: Normal range of motion. Neck supple. No thyromegaly present.   Cardiovascular: Normal rate, regular rhythm, normal heart sounds and intact distal pulses.    Pulmonary/Chest: Effort normal and breath sounds normal. No respiratory distress. She exhibits no tenderness.   Abdominal: Soft. Bowel sounds are normal. There is no tenderness. There is no guarding.   Musculoskeletal: Normal range of motion. She exhibits no tenderness. "   Tightness of right cervical muscles   Lymphadenopathy:     She has no cervical adenopathy.   Neurological: She is alert and oriented to person, place, and time. No cranial nerve deficit or sensory deficit.   Skin: Skin is warm and dry. No rash noted.   Psychiatric: She has a normal mood and affect. Her behavior is normal.   Nursing note and vitals reviewed.      Assessment/Plan     Slurred speech  Hospital follow up  admission for slurred speech  Date of discharge is 3/24/19  reviewed the hospital  records and studies done  She will consult neurology Dr martel or dr Velázquez/dr tom  She also has an appt to see cardiology Dr Gaona  Discussed she should consider starting a statin  Follow up after the above      Cavernoma  New finding on MRI  She is getting an appt to consult Dr Trujillo at [place]  discussed she will repeat MRI/MRA in 3 months for follow up    Essential hypertension  BP is stable  She will cont HCTZ 12.5 mg daily    Cervical radiculopathy  Persisting symptoms  She will continue gabapentin 600 mg three times daily  Follow up with neurology    Hypercholesterolemia  Not controlled  discussed she should consider starting a statin  as discussed in the past visits  She will see dr Jimenez Tesfaye MD  3/26/2019  9:11 PM

## 2019-03-27 NOTE — ASSESSMENT & PLAN NOTE
Persisting symptoms  She will continue gabapentin 600 mg three times daily  Follow up with neurology

## 2019-03-27 NOTE — ASSESSMENT & PLAN NOTE
Hospital follow up  admission for slurred speech  Date of discharge is 3/24/19  reviewed the hospital  records and studies done  She will consult neurology Dr martel or dr Velázquez/dr tom  She also has an appt to see cardiology Dr Gaona  Discussed she should consider starting a statin  Follow up after the above

## 2019-03-27 NOTE — ASSESSMENT & PLAN NOTE
Not controlled  discussed she should consider starting a statin  as discussed in the past visits  She will see dr Gaona

## 2019-03-27 NOTE — ASSESSMENT & PLAN NOTE
New finding on MRI  She is getting an appt to consult Dr Trujillo at [place]  discussed she will repeat MRI/MRA in 3 months for follow up

## 2019-04-18 ENCOUNTER — OFFICE VISIT (OUTPATIENT)
Dept: NEUROLOGY | Facility: CLINIC | Age: 54
End: 2019-04-18
Payer: MEDICARE

## 2019-04-18 VITALS — SYSTOLIC BLOOD PRESSURE: 100 MMHG | DIASTOLIC BLOOD PRESSURE: 78 MMHG

## 2019-04-18 DIAGNOSIS — R10.9 ABDOMINAL PAIN, UNSPECIFIED ABDOMINAL LOCATION: ICD-10-CM

## 2019-04-18 DIAGNOSIS — Q28.3 CEREBRAL CAVERNOMA: Primary | ICD-10-CM

## 2019-04-18 DIAGNOSIS — Z87.442 HISTORY OF KIDNEY STONES: ICD-10-CM

## 2019-04-18 PROCEDURE — 99205 OFFICE O/P NEW HI 60 MIN: CPT | Performed by: PSYCHIATRY & NEUROLOGY

## 2019-04-19 NOTE — PROGRESS NOTES
"Patient ID: Rajwinder Wynne                              : 1965  MRN: [id number]                                            VISIT DATE: 2019    PRIMARY CARE PROVIDER: Maya Tesfaye MD    CONSULTING PHYSICIAN: Dio Garber DO    CHIEF COMPLAINT: Speech Problem and Cavernous Malformation    HISTORY OF PRESENT ILLNESS:  Dear Dr. Tesfaye,    I had the pleasure of seeing your patient Ms. Rajwinder Wynne at the [facility] neurology clinic for a consultation.    As you know, Ms. Wynne is a 53 year old left handed female with a past medical history of HTN, HLD, degenerative spine disease s/p 2x cervical spine surgery (1996, 10/2009), s/p 1x lumbar spine surgery and migraine headache who was in her USOH when on 3/23/2019 she had an episode of slurred speech while at an [employer] restaurant.  She was seated and eating a pizza at the time.  Noted her speech was slow and slurred.  She \"felt high\".  Felt unbalanced. Had a headache which should not specify the quality of.  Estimated severity 5 out of 10.  Denied facial droop, vision loss, arm/leg weakness nor gait ataxia.  She did later notice some horizontal diplopia.  Speech disturbance largely resolved in about 1.5 hours.  She apparently had taken an extra dose of gabapentin earlier that day for neuropathy (usually takes 600 mg TID).  She then went to [facility] ER where initial vital signs included /99, HR 83 bpm and temp 97 F.  Stat CT head noncontrast showed no CT evidence of acute intracranial pathology.  Was admitted for observation.  An MRI brain, MRA head/neck noncontrast was obtained the next day, which showed no MRI evidence of acute ischemic stroke although there was a focus of susceptibility in the left parietal lobe consistent with a cavernoma.  Was discharged home from the ER.    Notes a mild headache still today, which is not unusual for her.  Speech is back to baseline.  Has still has intermittent mild diplopia.    I reviewed her outside medical " records personally.  She recently saw neurosurgeon Dr. RUBY Trujillo at the [facility].  Her regular neurologist is Dr. Ford Arnett.  She has also seen neurologist Dr. Vu Hilliard several years ago.    MEDICATIONS:   Current Outpatient Prescriptions:   •  b complex vitamins tablet, Take 400 mg by mouth daily., Disp: , Rfl:   •  cetirizine (ZyrTEC) 10 mg tablet, Take 1 tablet by mouth daily., Disp: , Rfl:   •  cholecalciferol, vitamin D3, (VITAMIN D3) 1,000 unit capsule, take 1 tablet by oral route  every day, Disp: , Rfl:   •  fluticasone (FLONASE) 50 mcg/actuation nasal spray, SPRAY 2 SPRAYS INTO EACH NOSTRIL EVERY DAY, Disp: 16 g, Rfl: 3  •  folic acid/multivit,iron, (CENTRUM ORAL), Take by mouth., Disp: , Rfl:   •  gabapentin (NEURONTIN) 300 mg capsule, Take 600 mg by mouth 3 (three) times a day.  , Disp: , Rfl:   •  hydrochlorothiazide (HYDRODIURIL) 25 mg tablet, Take 0.5 tablets (12.5 mg total) by mouth daily., Disp: 45 tablet, Rfl: 1  •  KLOR-CON M10 10 mEq CR tablet, TAKE 1 TABLET EVERY DAY, Disp: 90 tablet, Rfl: 1  •  meloxicam (MOBIC) 7.5 mg tablet, Take 7.5 mg by mouth daily., Disp: , Rfl:   •  mv-min-FA-Gr-Rj-epmxmxg-lutein 0.4-162-18 mg tablet, Take 1 tablet by mouth daily., Disp: , Rfl:   •  pantoprazole (PROTONIX) 40 mg EC tablet, TAKE 1 TABLET BY MOUTH EVERY DAY, Disp: 30 tablet, Rfl: 3  •  progesterone (PROMETRIUM) 100 mg capsule, Take 100 mg by mouth once daily., Disp: , Rfl: 3  •  estradiol (VIVELLE-DOT) 0.0375 mg/24 hr, 0.0375 patches., Disp: , Rfl: 3  •  valACYclovir (VALTREX) 500 mg tablet, , Disp: , Rfl:     PAST MEDICAL HISTORY:  has a past medical history of Allergic rhinitis; Cervical myelopathy (CMS/HCC) (HCC); Cervical radiculopathy; Cervicogenic headache; Closed fracture of sesamoid bone of foot; Cricopharyngeal dysphagia; Fibroids; Food allergy; GERD (gastroesophageal reflux disease); Lipid disorder; Nephrolithiasis; and Osteoporosis.    PAST SURGICAL  HISTORY:  has a past surgical history that includes Anterior fusion cervical spine; Cricopharyngeal myotomy; Lumbar fusion; Myomectomy; Knee surgery; Throat surgery; Tibial sesamoid excision; and Bunionectomy.    SOCIAL HISTORY:  reports that she has never smoked. She has never used smokeless tobacco. She reports that she does not drink alcohol or use drugs. . She has a boyfriend. She is on disability. She has a college level of education.    FAMILY HISTORY: family history includes Breast cancer in her mother's sister; Colon cancer in her maternal grandfather; Hyperlipidemia in her mother; Hypertension in her mother; Ovarian cancer in her father's sister; Pulmonary fibrosis in her father.    ALLERGIES: is allergic to shellfish containing products; adhesive; almond; berry flavor; erythromycin; erythromycin base; latex; banana; iodine; levonorgestrel-ethinyl estrad; naprelan; naproxen; naproxen sodium; penicillin g; penicillin v potassium; penicillins; pineapple; pumpkin seed; and shellfish derived.     REVIEW OF SYSTEMS: Neck pain, back pain, nausea, diplopia, dizziness, trouble talking and headache.  All other systems reviewed and negative except as noted in the HPI.    PHYSICAL EXAM:  /78   GENERAL APPEARANCE: Well appearing, well nourished and normally developed female.  Not in acute distress.  Appears stated age.  HEAD: Normocephalic, atraumatic. No forehead, maxillary nor temporal tenderness to palpation/tympany.  EYES:  Sclerae white. Conjunctivae clear.  FUNDOSCOPIC: Flat optic discs. No papilledema. No retinal hemorrhages.  NECK:  No bruits auscultated over the carotid regions. Neck was supple.  CARDIOVASCULAR:  Regular rate and rhythm. S1, S2 auscultated. No murmurs, rubs or gallops.  RESPIRATORY: Lungs clear to auscultation. No crackles, rhonchi or wheezing.  EXTREMITIES: No clubbing, no cyanosis nor edema.  MUSCULOSKELETAL: Normal muscle bulk and tone.  No spasticity nor rigidity.  No  tremor.  SKIN:  Dry, intact. No rashes noted. Warm to touch.  PSYCHIATRIC: Calm and cooperative with appropriate insight    NEUROLOGICAL EXAM:  MENTAL STATUS: Alert, awake and oriented x 4, intact naming of simple objects, spelled WORLD backwards on first attempt, 3/3 immediate+delayed recall without cues. Spontaneous fluent speech output.  CRANIAL NERVES:  Full visual fields to finger-counting. Pupils are equal, round and reactive to light. Extraocular movements are intact. No nystagmus. Symmetric smile. Uvula and tongue is midline. No dysarthria. No dysphonia.  MOTOR STRENGTH:  5/5 biceps, triceps, deltoid, finger spread, opponens pollicis,  hip flexor, knee extension, knee flexion and foot dorsiflexion bilaterally. No pronator drift noted.  REFLEXES:  2+ biceps, brachioradialis and triceps bilaterally. 2+ patellar, 1+ Achilles reflexes bilaterally.   SENSATION: Face, arms and legs were intact to light touch, cold temperature and vibration sensation.  COORDINATION/GAIT:  Finger-to-nose-to-finger was intact, no dysmetria.  Rapid alternating movements to finger tapping was intact, no dysdiadochokinesia.  Intact station and gait with a normal arm swing and stride length. No listing or ataxia was seen.      LABORATORY STUDIES:  10/26/2018: HbA1c 5.1%, TSH 3.41, Vitamin D 25 OH 48, CRP < 6  3/24/2019: Chol 223, TGL 72, HDL 66,   CBC Results       03/24/19 03/23/19 10/26/18                    0929 2341 0727         WBC 7.41 7.71 6.41         RBC 4.04 4.10 4.22         HGB 13.5 13.5 14.0         HCT 39.4 40.3 42.5         MCV 97.5 98.3 (H) 100.7 (H)         MCH 33.4 (H) 32.9 33.2         MCHC 34.3 33.5 32.9          342 356                   BMP Results       03/24/19 03/24/19 03/23/19                    0932 0706 2341          141 140         K 3.6 3.8 4.0         Cl 106 108 106         CO2 24 25 23         Glucose 99 96 103 (H)         BUN 12 12 17         Creatinine 0.6 0.6 0.7         Calcium 9.4  9.1 9.4         Anion Gap 10 8 11         EGFR &gt;60.0 &gt;60.0 &gt;60.0         Comment for K at 2341 on 03/23/19:    Results obtained on plasma. Plasma Potassium values may be up to 0.4 mEQ/L less than serum values. The differences may be greater for patients with high platelet or white cell counts.      PT/PTT Results       03/23/19                          2341           PT 12.2           INR 0.9           Comment for INR at 2341 on 03/23/19:    INR has no defined significance when PT is within Reference Range.      UA Results       03/23/19                          2343           Color Yellow           Clarity Turbid (A)           Glucose Negative           Bilirubin Negative           Ketones Negative           Sp Grav 1.020           Blood Negative           Ph 7.0           Protein Negative           Urobilinogen 0.2           Nitrite Negative           Leuk Est Negative           WBC 0 TO 3           RBC 0 TO 4           Bacteria Rare (A)            Labs were reviewed by me personally.      IMAGING STUDIES:     CT head noncontrast 3/24/2019:  IMPRESSION: Unremarkable noncontrast CT of the brain.    MRI brain, MRA head/neck noncontrast 3/24/2019:  CLINICAL HISTORY:  Slurred speech.  Clinical concern for stroke.     COMMENT:     Comparison: CT head 3/23/2019, MRI brain 2/1/2013     Technique: Multiplanar MR imaging of the brain was performed without intravenous  contrast. 3D time of flight MRA of the head was performed. 2D time of flight MRA  of the neck and 3D time of flight MRA of the carotid artery bifurcations were  performed.     Findings:  Ventricles and sulci are normal in size and configuration. No diffusion evidence  of acute infarction. No extra axial collection, mass-effect, or edema.  There is  a focus of susceptibility in the left parietal lobe, which show central T1 and  T2 hyperintense signal and a peripheral T2 hypointense rim, which is new  compared 2/1/2013. Cerebellar tonsils are in normal  location. The sella appears  unremarkable. The major intracranial flow voids at the skull base are normal in  appearance.  Mild mucosal thickening in the ethmoid air cells. Bone marrow  signal is within normal limits.  Partially imaged is susceptibility artifact in  the anterior cervical spine related to cervical fusion.     MRA Head:  There is normal antegrade flow in the major intracranial arteries. No flow gap  to suggest high grade stenosis. No evidence of medium-large aneurysm (MRA is not  as sensitive in detection of aneurysms less than 4 mm).     MRA Neck:  There is normal antegrade flow in the bilateral common carotid, cervical  internal carotid and vertebral arteries. No flow gap to suggest high grade  stenosis. (Flow gap correlates with greater than 70% stenosis based on NASCET  criteria).  -  CAROTID IMAGING CRITERIA:  Measurement of carotid stenosis is based on  parameters that correlate the residual internal carotid lumen diameter with  NASCET-based stenosis levels.  --  IMPRESSION:  1. No evidence of acute infarction.  2. Small focus of susceptibility in the left parietal lobe without surrounding  edema, which likely represents a cavernoma, however this is new compared to  2/1/2013 and a late subacute to chronic microhemorrhage is not excluded.  Short-term follow-up MRI brain without and with intravenous contrast in 3 months  is recommended.  3. No high-grade stenosis of the major intracranial arteries.  4. No high-grade stenosis of the cervical carotid or vertebral arteries.     CT/MRI/MRA scans were visualized by me personally.      CARDIOVASCULAR STUDIES:    12-lead EKG 3/23/2019: HR 71 bpm, NSR.      ASSESSMENT:  A 53 year old left handed female who had an episode of slurred speech, diplopia and headache.  MRI brain scan showed a small area of susceptibility in the left parietal lobe region consistent with a cavernoma.  The cause for her episode is unclear at this time.  Differential diagnosis  includes TIA (especially as the episode occurred in the setting of uncontrolled hypertension), focal onset seizure with alteration of awareness, medication side effect (extra gabapentin dose that day) and/or atypical migraine with aura.      RECOMMENDATIONS:    Slurred speech  - I have ordered a routine EEG study to rule out electrographic tendency towards epilepsy.  - Stroke precautions should be observed including immediately calling 9-11 and/or reporting to the nearest emergency room if development of any concerning stroke like symptoms which include but are not limited to: facial weakness, speech disturbance, vision loss, asymmetric limb weakness or gait ataxia.    Abnormal MRI brain scan, probable small cavernoma in the left parietal lobe region  - The pathophysiology of cavernoma was discussed at length with the patient.  - She is following with neurosurgery at the [facility] and is tentatively scheduled for a follow-up MRI brain scan in 2 months for surveillance purposes.    Degenerative spine disease  - Pain management.  - Avoid excessive sedating medications due to possible drug side effect.    Hypertension  - SBP goal < 140 mm Hg from a neurology standpoint.    I have asked Ms. Wynne to follow-up in the neurology clinic in 2-3 months.    Thank you for allowing me to participate in the care of your patient.  Please feel free to contact me at any time if you have any further questions.    39 minutes total face-to-face encounter time, of which greater than 50% of time was spent counseling/coordination of care.    Dio Garber DO  Neurologist  [facility]

## 2019-04-25 RX ORDER — HYDROCHLOROTHIAZIDE 25 MG/1
12.5 TABLET ORAL DAILY
Qty: 45 TABLET | Refills: 1 | Status: SHIPPED | OUTPATIENT
Start: 2019-04-25 | End: 2019-04-26 | Stop reason: SDUPTHER

## 2019-04-26 RX ORDER — HYDROCHLOROTHIAZIDE 25 MG/1
25 TABLET ORAL DAILY
Qty: 90 TABLET | Refills: 1 | Status: SHIPPED | OUTPATIENT
Start: 2019-04-26 | End: 2019-10-04 | Stop reason: SDUPTHER

## 2019-04-29 RX ORDER — FLUTICASONE PROPIONATE 50 MCG
SPRAY, SUSPENSION (ML) NASAL
Qty: 16 G | Refills: 3 | Status: SHIPPED | OUTPATIENT
Start: 2019-04-29 | End: 2019-10-15 | Stop reason: SDUPTHER

## 2019-05-22 RX ORDER — PANTOPRAZOLE SODIUM 40 MG/1
TABLET, DELAYED RELEASE ORAL
Qty: 30 TABLET | Refills: 2 | Status: SHIPPED | OUTPATIENT
Start: 2019-05-22 | End: 2019-08-27 | Stop reason: SDUPTHER

## 2019-06-03 ENCOUNTER — TRANSCRIBE ORDERS (OUTPATIENT)
Dept: SCHEDULING | Age: 54
End: 2019-06-03

## 2019-06-03 DIAGNOSIS — Q28.2 ARTERIOVENOUS MALFORMATION OF CEREBRAL VESSELS: ICD-10-CM

## 2019-06-03 DIAGNOSIS — Q92.2 CHROMOSOME XQ27.3-Q28 DUPLICATION SYNDROME: Primary | ICD-10-CM

## 2019-06-03 DIAGNOSIS — Q28.0 ARTERIOVENOUS MALFORMATION OF PRECEREBRAL VESSELS: ICD-10-CM

## 2019-06-11 ENCOUNTER — HOSPITAL ENCOUNTER (OUTPATIENT)
Dept: RADIOLOGY | Facility: HOSPITAL | Age: 54
Discharge: HOME | End: 2019-06-11
Attending: NEUROLOGICAL SURGERY
Payer: MEDICARE

## 2019-06-11 DIAGNOSIS — Q28.3 CAVERNOUS MALFORMATION: Primary | ICD-10-CM

## 2019-06-11 PROCEDURE — 70551 MRI BRAIN STEM W/O DYE: CPT

## 2019-06-18 ENCOUNTER — OFFICE VISIT (OUTPATIENT)
Dept: INTERNAL MEDICINE | Facility: CLINIC | Age: 54
End: 2019-06-18
Payer: MEDICARE

## 2019-06-18 VITALS
BODY MASS INDEX: 24.51 KG/M2 | RESPIRATION RATE: 18 BRPM | OXYGEN SATURATION: 98 % | HEIGHT: 57 IN | HEART RATE: 66 BPM | DIASTOLIC BLOOD PRESSURE: 60 MMHG | SYSTOLIC BLOOD PRESSURE: 110 MMHG | TEMPERATURE: 98.1 F | WEIGHT: 113.6 LBS

## 2019-06-18 DIAGNOSIS — I10 ESSENTIAL HYPERTENSION: ICD-10-CM

## 2019-06-18 DIAGNOSIS — H92.02 LEFT EAR PAIN: Primary | ICD-10-CM

## 2019-06-18 DIAGNOSIS — Q28.3 CEREBRAL CAVERNOMA: ICD-10-CM

## 2019-06-18 PROBLEM — E78.00 HYPERCHOLESTEROLEMIA: Status: RESOLVED | Noted: 2018-07-09 | Resolved: 2019-06-18

## 2019-06-18 PROBLEM — R47.81 SLURRED SPEECH: Status: RESOLVED | Noted: 2019-03-24 | Resolved: 2019-06-18

## 2019-06-18 PROBLEM — D18.00 CAVERNOMA: Status: RESOLVED | Noted: 2019-03-26 | Resolved: 2019-06-18

## 2019-06-18 PROCEDURE — 99214 OFFICE O/P EST MOD 30 MIN: CPT | Performed by: INTERNAL MEDICINE

## 2019-06-18 RX ORDER — CIPROFLOXACIN AND DEXAMETHASONE 3; 1 MG/ML; MG/ML
2 SUSPENSION/ DROPS AURICULAR (OTIC) 2 TIMES DAILY
Qty: 7.5 ML | Refills: 0 | Status: SHIPPED | OUTPATIENT
Start: 2019-06-18 | End: 2019-06-18 | Stop reason: SDUPTHER

## 2019-06-18 RX ORDER — CIPROFLOXACIN AND DEXAMETHASONE 3; 1 MG/ML; MG/ML
2 SUSPENSION/ DROPS AURICULAR (OTIC) 2 TIMES DAILY
Qty: 7.5 ML | Refills: 0 | Status: SHIPPED | OUTPATIENT
Start: 2019-06-18 | End: 2019-10-15 | Stop reason: SDUPTHER

## 2019-06-18 ASSESSMENT — ENCOUNTER SYMPTOMS
BACK PAIN: 0
FEVER: 0
BRUISES/BLEEDS EASILY: 0
PALPITATIONS: 0
DIZZINESS: 0
SINUS PRESSURE: 1
HEADACHES: 0
SHORTNESS OF BREATH: 0
FATIGUE: 0
BLOOD IN STOOL: 0
COUGH: 0
HEMATURIA: 0
DYSURIA: 0
NAUSEA: 0
EYE PAIN: 0
VOMITING: 0
CHILLS: 0
DIARRHEA: 0
ABDOMINAL PAIN: 0
NUMBNESS: 0
CONFUSION: 0
SORE THROAT: 0

## 2019-06-18 NOTE — PATIENT INSTRUCTIONS
PLAN    Start cipro ear drops twice daily for 7 days  Take tylenol or mobic drink lots of water  No q tips

## 2019-06-18 NOTE — PROGRESS NOTES
Subjective      Patient ID: Rajwinder Wynne is a 53 y.o. female     Earache / Otalgia    There is pain in the left ear. This is a new problem. The current episode started in the past 7 days. Progression since onset: persisting,worse in the last few days. There has been no fever. The pain is at a severity of 5/10. The pain is moderate. Pertinent negatives include no abdominal pain, coughing, diarrhea, headaches, rash, sore throat or vomiting. Associated symptoms comments: Ear and sinus congestion. She has tried NSAIDs for the symptoms. The treatment provided mild relief.      Here for persisting left ear discomfort  She has had symptoms for over a week worse in the last few days    Past Medical History:   Diagnosis Date   • Allergic rhinitis    • Cerebral cavernoma     parietal cavernoma   • Cervical myelopathy (CMS/HCC) (HCC)    • Cervical radiculopathy    • Cervicogenic headache    • Closed fracture of sesamoid bone of foot    • Cricopharyngeal dysphagia    • Fibroids    • Food allergy    • GERD (gastroesophageal reflux disease)    • Lipid disorder    • Nephrolithiasis    • Osteoporosis        Past Surgical History:   Procedure Laterality Date   • ANTERIOR FUSION CERVICAL SPINE      c6-7   • BUNIONECTOMY     • CRICOPHARYNGEAL MYOTOMY     • KNEE SURGERY     • LUMBAR FUSION      L5-S1   • MYOMECTOMY     • THROAT SURGERY     • TIBIAL SESAMOID EXCISION         Family History   Problem Relation Age of Onset   • Hyperlipidemia Mother    • Hypertension Mother    • Pulmonary fibrosis Father    • Colon cancer Maternal Grandfather    • Breast cancer Mother's Sister    • Ovarian cancer Father's Sister        Social History     Social History   • Marital status:      Spouse name: N/A   • Number of children: N/A   • Years of education: N/A     Occupational History   • Not on file.     Social History Main Topics   • Smoking status: Never Smoker   • Smokeless tobacco: Never Used   • Alcohol use No   • Drug use: No   •  Sexual activity: Not on file     Other Topics Concern   • Not on file     Social History Narrative   • No narrative on file       Allergies   Allergen Reactions   • Liriano Flavor Other (see comments)   • Shellfish Containing Products Anaphylaxis     Pumpkin seeds   • Adhesive      Steri strips   • [place] Other (see comments)   • Banana Other (see comments)     also allergic to almonds, berries   • Erythromycin    • Erythromycin Base Other (see comments)     GI Upset  Other reaction(s): GI side effects   • Iodine Other (see comments)     shell fish allergy   • Latex Other (see comments)     patient not sure   • Levonorgestrel-Ethinyl Estrad    • Naprelan    • Naproxen Other (see comments)     dysphagia throat burning   • Naproxen Sodium      Other reaction(s): lump in throat   • Penicillin G    • Penicillin V Potassium Hives   • Penicillins Hives   • Pineapple Other (see comments)   • Pumpkin Seed      Other reaction(s): throat gets tight   • Shellfish Derived Hives       Current Outpatient Prescriptions   Medication Sig Dispense Refill   • b complex vitamins tablet Take 400 mg by mouth daily.     • cetirizine (ZyrTEC) 10 mg tablet Take 1 tablet by mouth daily.     • cholecalciferol, vitamin D3, (VITAMIN D3) 1,000 unit capsule take 1 tablet by oral route  every day     • fluticasone propionate (FLONASE) 50 mcg/actuation nasal spray SPRAY 2 SPRAYS INTO EACH NOSTRIL EVERY DAY 16 g 3   • folic acid/multivit,iron, (CENTRUM ORAL) Take by mouth.     • gabapentin (NEURONTIN) 300 mg capsule Take 900 mg by mouth 3 (three) times a day.       • hydrochlorothiazide (HYDRODIURIL) 25 mg tablet Take 1 tablet (25 mg total) by mouth daily. 90 tablet 1   • KLOR-CON M10 10 mEq CR tablet TAKE 1 TABLET EVERY DAY 90 tablet 1   • meloxicam (MOBIC) 7.5 mg tablet Take 7.5 mg by mouth daily.     • pantoprazole (PROTONIX) 40 mg EC tablet TAKE 1 TABLET BY MOUTH EVERY DAY 30 tablet 2   • ciprofloxacin-dexamethasone (CIPRODEX) 0.3-0.1 % otic  "suspension Administer 2 drops into each ear 2 (two) times a day for 7 days. 7.5 mL 0     No current facility-administered medications for this visit.        /60   Pulse 66   Temp 36.7 °C (98.1 °F) (Oral)   Resp 18   Ht 1.448 m (4' 9\")   Wt 51.5 kg (113 lb 9.6 oz)   SpO2 98%   BMI 24.58 kg/m²       The following have been reviewed and updated as appropriate in this visit:  Allergies  Meds  Problems       Review of Systems   Constitutional: Negative for chills, fatigue and fever.   HENT: Positive for ear pain and sinus pressure. Negative for sore throat.    Eyes: Negative for pain and visual disturbance.   Respiratory: Negative for cough and shortness of breath.    Cardiovascular: Negative for chest pain, palpitations and leg swelling.   Gastrointestinal: Negative for abdominal pain, blood in stool, diarrhea, nausea and vomiting.   Genitourinary: Negative for dysuria and hematuria.   Musculoskeletal: Negative for back pain.   Skin: Negative for rash.   Allergic/Immunologic: Negative for environmental allergies.   Neurological: Negative for dizziness, numbness and headaches.   Hematological: Does not bruise/bleed easily.   Psychiatric/Behavioral: Negative for confusion.       Objective       Physical Exam   Constitutional: She is oriented to person, place, and time. She appears well-developed and well-nourished.   HENT:   Head: Normocephalic and atraumatic.   Right Ear: External ear normal.   Nose: Nose normal.   Mouth/Throat: Oropharynx is clear and moist.     Redness left ear canal  Maxillary and frontal sinus tenderness       Eyes: Pupils are equal, round, and reactive to light. Conjunctivae and EOM are normal.   Neck: Normal range of motion. Neck supple. No thyromegaly present.   Cardiovascular: Normal rate, regular rhythm, normal heart sounds and intact distal pulses.    Pulmonary/Chest: Effort normal and breath sounds normal. No respiratory distress. She exhibits no tenderness.   Abdominal: Soft. " Bowel sounds are normal. There is no tenderness. There is no guarding.   Musculoskeletal: Normal range of motion. She exhibits no tenderness.   Lymphadenopathy:     She has no cervical adenopathy.   Neurological: She is alert and oriented to person, place, and time. No cranial nerve deficit or sensory deficit.   Skin: Skin is warm and dry. No rash noted.   Psychiatric: She has a normal mood and affect. Her behavior is normal.   Nursing note and vitals reviewed.      Assessment/Plan     Left ear pain   persisting symptoms  Early otitis  Advised her to start Ciprodex eardrops 2 drops twice daily for 7 days  She was advised to take Tylenol 650 twice daily  She will continue the fluticasone nasal spray  Drink lots of fluids   call me with an update next week    Essential hypertension  BP is stable  She will cont HCTZ 12.5 mg daily    Cerebral cavernoma  Fair control  She saw Dr. Dio Garber at [facility]  She will follow-up with Dr. Trujillo/at [place] and get follow up[ MRI        Maya Tesfaye MD  6/18/2019  7:57 PM

## 2019-06-18 NOTE — ASSESSMENT & PLAN NOTE
persisting symptoms  Early otitis  Advised her to start Ciprodex eardrops 2 drops twice daily for 7 days  She was advised to take Tylenol 650 twice daily  She will continue the fluticasone nasal spray  Drink lots of fluids   call me with an update next week

## 2019-06-18 NOTE — ASSESSMENT & PLAN NOTE
Fair control  She saw Dr. Dio Garber at [facility]  She will follow-up with Dr. Trujillo/at [place] and get follow up[ MRI

## 2019-06-28 ENCOUNTER — OFFICE VISIT (OUTPATIENT)
Dept: INTERNAL MEDICINE | Facility: CLINIC | Age: 54
End: 2019-06-28
Payer: MEDICARE

## 2019-06-28 VITALS
TEMPERATURE: 98.4 F | HEART RATE: 67 BPM | RESPIRATION RATE: 18 BRPM | WEIGHT: 113.2 LBS | DIASTOLIC BLOOD PRESSURE: 60 MMHG | HEIGHT: 57 IN | BODY MASS INDEX: 24.42 KG/M2 | OXYGEN SATURATION: 98 % | SYSTOLIC BLOOD PRESSURE: 100 MMHG

## 2019-06-28 DIAGNOSIS — Q28.3 CEREBRAL CAVERNOMA: ICD-10-CM

## 2019-06-28 DIAGNOSIS — E78.5 HYPERLIPIDEMIA, UNSPECIFIED HYPERLIPIDEMIA TYPE: ICD-10-CM

## 2019-06-28 DIAGNOSIS — J01.90 ACUTE SINUSITIS, RECURRENCE NOT SPECIFIED, UNSPECIFIED LOCATION: ICD-10-CM

## 2019-06-28 DIAGNOSIS — R73.9 HYPERGLYCEMIA: ICD-10-CM

## 2019-06-28 DIAGNOSIS — E55.9 VITAMIN D DEFICIENCY: ICD-10-CM

## 2019-06-28 DIAGNOSIS — I10 ESSENTIAL HYPERTENSION: ICD-10-CM

## 2019-06-28 PROBLEM — H92.02 LEFT EAR PAIN: Status: RESOLVED | Noted: 2019-06-18 | Resolved: 2019-06-28

## 2019-06-28 LAB
25(OH)D3 SERPL-MCNC: 42 NG/ML (ref 30–100)
ALBUMIN SERPL-MCNC: 4.3 G/DL (ref 3.4–5)
ALP SERPL-CCNC: 74 IU/L (ref 35–126)
ALT SERPL-CCNC: 19 IU/L (ref 11–54)
ANION GAP SERPL CALC-SCNC: 10 MEQ/L (ref 3–15)
AST SERPL-CCNC: 23 IU/L (ref 15–41)
BASOPHILS # BLD: 0.07 K/UL (ref 0.01–0.1)
BASOPHILS NFR BLD: 1.1 %
BILIRUB SERPL-MCNC: 0.7 MG/DL (ref 0.3–1.2)
BUN SERPL-MCNC: 13 MG/DL (ref 8–20)
CALCIUM SERPL-MCNC: 10.8 MG/DL (ref 8.9–10.3)
CHLORIDE SERPL-SCNC: 101 MEQ/L (ref 98–109)
CHOLEST SERPL-MCNC: 234 MG/DL
CO2 SERPL-SCNC: 30 MEQ/L (ref 22–32)
CREAT SERPL-MCNC: 0.7 MG/DL
CRP SERPL-MCNC: <=6 MG/L
DIFFERENTIAL METHOD BLD: ABNORMAL
EOSINOPHIL # BLD: 0.52 K/UL (ref 0.04–0.36)
EOSINOPHIL NFR BLD: 8.1 %
ERYTHROCYTE [DISTWIDTH] IN BLOOD BY AUTOMATED COUNT: 13.5 % (ref 11.7–14.4)
EST. AVERAGE GLUCOSE BLD GHB EST-MCNC: 108 MG/DL
GFR SERPL CREATININE-BSD FRML MDRD: >60 ML/MIN/1.73M*2
GLUCOSE SERPL-MCNC: 89 MG/DL (ref 70–99)
HBA1C MFR BLD HPLC: 5.4 %
HCT VFR BLDCO AUTO: 42.8 %
HDLC SERPL-MCNC: 65 MG/DL
HDLC SERPL: 3.6 {RATIO}
HGB BLD-MCNC: 13.9 G/DL
IMM GRANULOCYTES # BLD AUTO: 0.01 K/UL (ref 0–0.08)
IMM GRANULOCYTES NFR BLD AUTO: 0.2 %
LDLC SERPL CALC-MCNC: 141 MG/DL
LYMPHOCYTES # BLD: 2.65 K/UL (ref 1.2–3.5)
LYMPHOCYTES NFR BLD: 41.1 %
MCH RBC QN AUTO: 33.3 PG (ref 28–33.2)
MCHC RBC AUTO-ENTMCNC: 32.5 G/DL (ref 32.2–35.5)
MCV RBC AUTO: 102.4 FL (ref 83–98)
MONOCYTES # BLD: 0.53 K/UL (ref 0.28–0.8)
MONOCYTES NFR BLD: 8.2 %
NEUTROPHILS # BLD: 2.67 K/UL (ref 1.7–7)
NEUTS SEG NFR BLD: 41.3 %
NONHDLC SERPL-MCNC: 169 MG/DL
NRBC BLD-RTO: 0 %
PDW BLD AUTO: 12.5 FL (ref 9.4–12.3)
PLATELET # BLD AUTO: 326 K/UL
POTASSIUM SERPL-SCNC: 4.4 MEQ/L (ref 3.6–5.1)
PROT SERPL-MCNC: 6.8 G/DL (ref 6–8.2)
RBC # BLD AUTO: 4.18 M/UL (ref 3.93–5.22)
SODIUM SERPL-SCNC: 141 MEQ/L (ref 136–144)
TRIGL SERPL-MCNC: 139 MG/DL (ref 30–149)
TSH SERPL DL<=0.05 MIU/L-ACNC: 2.07 MIU/L (ref 0.34–5.6)
WBC # BLD AUTO: 6.45 K/UL

## 2019-06-28 PROCEDURE — 86140 C-REACTIVE PROTEIN: CPT | Performed by: INTERNAL MEDICINE

## 2019-06-28 PROCEDURE — 80061 LIPID PANEL: CPT | Performed by: INTERNAL MEDICINE

## 2019-06-28 PROCEDURE — 83036 HEMOGLOBIN GLYCOSYLATED A1C: CPT | Performed by: INTERNAL MEDICINE

## 2019-06-28 PROCEDURE — 82306 VITAMIN D 25 HYDROXY: CPT | Performed by: INTERNAL MEDICINE

## 2019-06-28 PROCEDURE — 84443 ASSAY THYROID STIM HORMONE: CPT | Performed by: INTERNAL MEDICINE

## 2019-06-28 PROCEDURE — 80053 COMPREHEN METABOLIC PANEL: CPT | Performed by: INTERNAL MEDICINE

## 2019-06-28 PROCEDURE — 99214 OFFICE O/P EST MOD 30 MIN: CPT | Performed by: INTERNAL MEDICINE

## 2019-06-28 PROCEDURE — 85025 COMPLETE CBC W/AUTO DIFF WBC: CPT | Performed by: INTERNAL MEDICINE

## 2019-06-28 RX ORDER — AZITHROMYCIN 250 MG/1
250 TABLET, FILM COATED ORAL DAILY
Qty: 6 TABLET | Refills: 0 | Status: SHIPPED | OUTPATIENT
Start: 2019-06-28 | End: 2019-10-15

## 2019-06-28 RX ORDER — POTASSIUM CHLORIDE 750 MG/1
TABLET, EXTENDED RELEASE ORAL
Qty: 90 TABLET | Refills: 1 | Status: SHIPPED | OUTPATIENT
Start: 2019-06-28 | End: 2019-12-19 | Stop reason: SDUPTHER

## 2019-06-28 ASSESSMENT — ENCOUNTER SYMPTOMS
CONFUSION: 0
BLOOD IN STOOL: 0
NECK PAIN: 1
ABDOMINAL PAIN: 0
SINUS PAIN: 1
CHILLS: 0
HEMATURIA: 0
NUMBNESS: 0
EYE PAIN: 0
FEVER: 0
COUGH: 0
FATIGUE: 0
PALPITATIONS: 0
DYSURIA: 0
SORE THROAT: 0
SHORTNESS OF BREATH: 0
SINUS PRESSURE: 1
DIZZINESS: 0
DIARRHEA: 0
BRUISES/BLEEDS EASILY: 0
NAUSEA: 0
HEADACHES: 0
BACK PAIN: 0
VOMITING: 0

## 2019-06-28 NOTE — PROGRESS NOTES
Subjective      Patient ID: Rajwinder Wynne is a 53 y.o. female     HPI     Here for 6 month follow up  She still has persisting ear congestion,sinus and nasal congestion and feels the ear drops helped the pain  She saw dr Trujillo and was told she did not have TIA and will get a follow up MRI  She is worried about her mother's surgery for osteoma  She has not been compliant with her diet  Wants to get blood work today      Past Medical History:   Diagnosis Date   • Allergic rhinitis    • Cerebral cavernoma     parietal cavernoma   • Cervical myelopathy (CMS/HCC) (HCC)    • Cervical radiculopathy    • Cervicogenic headache    • Closed fracture of sesamoid bone of foot    • Cricopharyngeal dysphagia    • Fibroids    • Food allergy    • GERD (gastroesophageal reflux disease)    • Lipid disorder    • Nephrolithiasis    • Osteoporosis        Past Surgical History:   Procedure Laterality Date   • ANTERIOR FUSION CERVICAL SPINE      c6-7   • BUNIONECTOMY     • CRICOPHARYNGEAL MYOTOMY     • KNEE SURGERY     • LUMBAR FUSION      L5-S1   • MYOMECTOMY     • THROAT SURGERY     • TIBIAL SESAMOID EXCISION         Family History   Problem Relation Age of Onset   • Hyperlipidemia Mother    • Hypertension Mother    • Pulmonary fibrosis Father    • Colon cancer Maternal Grandfather    • Breast cancer Mother's Sister    • Ovarian cancer Father's Sister        Social History     Social History   • Marital status:      Spouse name: N/A   • Number of children: N/A   • Years of education: N/A     Occupational History   • Not on file.     Social History Main Topics   • Smoking status: Never Smoker   • Smokeless tobacco: Never Used   • Alcohol use No   • Drug use: No   • Sexual activity: Not on file     Other Topics Concern   • Not on file     Social History Narrative   • No narrative on file       Allergies   Allergen Reactions   • Liriano Flavor Other (see comments)   • Shellfish Containing Products Anaphylaxis     Pumpkin seeds   •  Adhesive      Steri strips   • [place] Other (see comments)   • Banana Other (see comments)     also allergic to almonds, berries   • Erythromycin    • Erythromycin Base Other (see comments)     GI Upset  Other reaction(s): GI side effects   • Iodine Other (see comments)     shell fish allergy   • Latex Other (see comments)     patient not sure   • Levonorgestrel-Ethinyl Estrad    • Naprelan    • Naproxen Other (see comments)     dysphagia throat burning   • Naproxen Sodium      Other reaction(s): lump in throat   • Penicillin G    • Penicillin V Potassium Hives   • Penicillins Hives   • Pineapple Other (see comments)   • Pumpkin Seed      Other reaction(s): throat gets tight   • Shellfish Derived Hives       Current Outpatient Prescriptions   Medication Sig Dispense Refill   • b complex vitamins tablet Take 400 mg by mouth daily.     • cetirizine (ZyrTEC) 10 mg tablet Take 1 tablet by mouth daily.     • cholecalciferol, vitamin D3, (VITAMIN D3) 1,000 unit capsule take 1 tablet by oral route  every day     • ciprofloxacin-dexamethasone (CIPRODEX) 0.3-0.1 % otic suspension Administer 2 drops into each ear 2 (two) times a day for 7 days. 7.5 mL 0   • fluticasone propionate (FLONASE) 50 mcg/actuation nasal spray SPRAY 2 SPRAYS INTO EACH NOSTRIL EVERY DAY 16 g 3   • folic acid/multivit,iron, (CENTRUM ORAL) Take by mouth.     • gabapentin (NEURONTIN) 300 mg capsule Take 900 mg by mouth 3 (three) times a day.       • hydrochlorothiazide (HYDRODIURIL) 25 mg tablet Take 1 tablet (25 mg total) by mouth daily. 90 tablet 1   • KLOR-CON M10 10 mEq CR tablet TAKE 1 TABLET BY MOUTH EVERY DAY 90 tablet 1   • meloxicam (MOBIC) 7.5 mg tablet Take 7.5 mg by mouth daily.     • pantoprazole (PROTONIX) 40 mg EC tablet TAKE 1 TABLET BY MOUTH EVERY DAY 30 tablet 2   • azithromycin (ZITHROMAX) 250 mg tablet Take 1 tablet (250 mg total) by mouth daily for 5 days. Take 2 tablets the first day, then 1 tablet daily for 4 days. 6 tablet 0  "    No current facility-administered medications for this visit.        /60   Pulse 67   Temp 36.9 °C (98.4 °F) (Oral)   Resp 18   Ht 1.448 m (4' 9\")   Wt 51.3 kg (113 lb 3.2 oz)   SpO2 98%   BMI 24.50 kg/m²       The following have been reviewed and updated as appropriate in this visit:  Allergies  Meds  Problems       Review of Systems   Constitutional: Negative for chills, fatigue and fever.   HENT: Positive for postnasal drip, sinus pain and sinus pressure. Negative for ear pain and sore throat.    Eyes: Negative for pain and visual disturbance.   Respiratory: Negative for cough and shortness of breath.    Cardiovascular: Negative for chest pain, palpitations and leg swelling.   Gastrointestinal: Negative for abdominal pain, blood in stool, diarrhea, nausea and vomiting.   Genitourinary: Negative for dysuria and hematuria.   Musculoskeletal: Positive for neck pain. Negative for back pain.   Skin: Negative for rash.   Allergic/Immunologic: Negative for environmental allergies.   Neurological: Negative for dizziness, numbness and headaches.   Hematological: Does not bruise/bleed easily.   Psychiatric/Behavioral: Negative for confusion.       Objective       Physical Exam   Constitutional: She is oriented to person, place, and time. She appears well-developed and well-nourished.   HENT:   Head: Normocephalic and atraumatic.   Right Ear: External ear normal.   Left Ear: External ear normal.   Nose: Nose normal.   Mouth/Throat: Oropharynx is clear and moist.   Maxillary and frontal sinus tenderness  Nasal turbinates swollen and congested   Eyes: Pupils are equal, round, and reactive to light. Conjunctivae and EOM are normal.   Neck: Normal range of motion. Neck supple. No thyromegaly present.   Cardiovascular: Normal rate, regular rhythm, normal heart sounds and intact distal pulses.    Pulmonary/Chest: Effort normal and breath sounds normal. No respiratory distress. She exhibits no tenderness. "   Abdominal: Soft. Bowel sounds are normal. There is no tenderness. There is no guarding.   Musculoskeletal: Normal range of motion. She exhibits no tenderness.   Tightness of cervical muscles   Lymphadenopathy:     She has no cervical adenopathy.   Neurological: She is alert and oriented to person, place, and time. No cranial nerve deficit or sensory deficit.   Skin: Skin is warm and dry. No rash noted.   Psychiatric: She has a normal mood and affect. Her behavior is normal.   Nursing note and vitals reviewed.      Assessment/Plan     Acute sinusitis  Persisting symptoms  Start zapk with food'  use the ear drops for 3 days  Saline nasal spray daily  Drink lots of fluids  Follow up if not better    Essential hypertension  BP is stable  She will cont HCTZ 12.5 mg daily    Hyperlipidemia  Not controlled  Check labs  Statin therapy was discussed in the past  She will see dr Gaona    Hyperglycemia  Check fasting blood sugar and A1C    Cerebral cavernoma  Stable  Follow up with Dr Trujillo in 3 months    Vitamin D deficiency  Check labs        Maya Tesfaye MD  6/28/2019  7:18 PM

## 2019-06-28 NOTE — ASSESSMENT & PLAN NOTE
Persisting symptoms  Start zapk with food'  use the ear drops for 3 days  Saline nasal spray daily  Drink lots of fluids  Follow up if not better

## 2019-06-28 NOTE — PATIENT INSTRUCTIONS
PLAN  Get blood work today  Start zpak with food  Also use the ciprodex for 3 days  Cont the meds  See me in 6 months

## 2019-07-01 ENCOUNTER — TELEPHONE (OUTPATIENT)
Dept: INTERNAL MEDICINE | Facility: CLINIC | Age: 54
End: 2019-07-01

## 2019-07-01 DIAGNOSIS — E78.5 HYPERLIPIDEMIA, UNSPECIFIED HYPERLIPIDEMIA TYPE: Primary | ICD-10-CM

## 2019-07-01 NOTE — TELEPHONE ENCOUNTER
Spoke with the patient and reviewed the test results in detail she was advised to change her diet, cut out carbs and also drink lots of fluids and eliminate calcium pills she will repeat the lab in 6 weeks

## 2019-07-01 NOTE — LETTER
July 1, 2019     Rajwinder OCTAVIO Sherrell  [address]   [address]  [place] [id number]      Dear Ms. Wynne:    Below are the results from your recent visit:    Resulted Orders   Comprehensive metabolic panel   Result Value Ref Range    Sodium 141 136 - 144 mEQ/L    Potassium 4.4 3.6 - 5.1 mEQ/L    Chloride 101 98 - 109 mEQ/L    CO2 30 22 - 32 mEQ/L    BUN 13 8 - 20 mg/dL    Creatinine 0.7 0.6 - 1.1 mg/dL    Glucose 89 70 - 99 mg/dL    Calcium 10.8 (H) 8.9 - 10.3 mg/dL    AST (SGOT) 23 15 - 41 IU/L    ALT (SGPT) 19 11 - 54 IU/L    Alkaline Phosphatase 74 35 - 126 IU/L    Total Protein 6.8 6.0 - 8.2 g/dL    Albumin 4.3 3.4 - 5.0 g/dL    Bilirubin, Total 0.7 0.3 - 1.2 mg/dL    eGFR >60.0 >=60.0 mL/min/1.73m*2    Anion Gap 10 3 - 15 mEQ/L   Hemoglobin A1c   Result Value Ref Range    Hemoglobin A1C 5.4 <5.7 %    Estimated Ave Glucose 108 mg/dL      Comment:        Estimate of average glucose concentration continuously over 24 hours for previous 2 to 3 months(Per ADA Recommendation).   Lipid panel   Result Value Ref Range    Triglycerides 139 30 - 149 mg/dL    Cholesterol 234 (H) <=200 mg/dL    HDL 65 >=55 mg/dL      Comment:      2001 NCEP GUIDELINES  <40 mg/dL Low  >=60 mg/dL High    LDL Calculated 141 (H) <=100 mg/dL      Comment:      ATP III GUIDELINES  Optimal: <100 mg/dL  Near/Above Optimal: 100-129 mg/dL  Borderline High: 130-159 mg/dL  High: 160-189 mg/dL  Very High: >190 mg/dL      Non-HDL, Calculated 169 mg/dL    RISK 3.6 <=5.0      Comment:      RISK FEMALE ([place] STUDY DATA)  1/2 Average 3.3  Average 4.4  2X Average 7.1  3X Average 11.0   TSH   Result Value Ref Range    TSH 2.07 0.34 - 5.60 mIU/L   Vitamin D 25 hydroxy   Result Value Ref Range    Vit D, 25-Hydroxy 42 30 - 100 ng/mL      Comment:        INTERPRETATION  VITAMIN D STATUS   25 OH VITAMIN D    Deficiency          <20 ng/mL    Insufficiency     20-29 ng/mL    Sufficiency       ng/mL    Toxicity           >100 ng/mL  This test measures a  total of both 25 OH D2 and 25 OH D3.   C-reactive protein   Result Value Ref Range    CRP <=6.00 <=7.48 mg/L   CBC   Result Value Ref Range    WBC 6.45 3.80 - 10.50 K/uL    RBC 4.18 3.93 - 5.22 M/uL    Hemoglobin 13.9 11.8 - 15.7 g/dL    Hematocrit 42.8 35.0 - 45.0 %    .4 (H) 83.0 - 98.0 fL    MCH 33.3 (H) 28.0 - 33.2 pg    MCHC 32.5 32.2 - 35.5 g/dL    RDW 13.5 11.7 - 14.4 %    Platelets 326 150 - 369 K/uL    MPV 12.5 (H) 9.4 - 12.3 fL   Diff Count   Result Value Ref Range    Differential Type Auto     nRBC 0.0 <=0.0 %    Immature Granulocytes 0.2 %    Neutrophils 41.3 %    Lymphocytes 41.1 %    Monocytes 8.2 %    Eosinophils 8.1 %    Basophils 1.1 %    Immature Granulocytes, Absolute 0.01 0.00 - 0.08 K/uL    Neutrophils, Absolute 2.67 1.70 - 7.00 K/uL    Lymphocytes, Absolute 2.65 1.20 - 3.50 K/uL    Monocytes, Absolute 0.53 0.28 - 0.80 K/uL    Eosinophils, Absolute 0.52 (H) 0.04 - 0.36 K/uL    Basophils, Absolute 0.07 0.01 - 0.10 K/uL     As discussed this is a copy of the test results  Please repeat the labs in 6 to 8 weeks     Sincerely,          Maya Tesfaye MD

## 2019-08-27 RX ORDER — PANTOPRAZOLE SODIUM 40 MG/1
TABLET, DELAYED RELEASE ORAL
Qty: 90 TABLET | Refills: 0 | Status: SHIPPED | OUTPATIENT
Start: 2019-08-27 | End: 2020-05-18

## 2019-10-02 ENCOUNTER — APPOINTMENT (OUTPATIENT)
Dept: LAB | Facility: HOSPITAL | Age: 54
End: 2019-10-02
Attending: INTERNAL MEDICINE
Payer: MEDICARE

## 2019-10-02 DIAGNOSIS — E78.5 HYPERLIPIDEMIA, UNSPECIFIED HYPERLIPIDEMIA TYPE: ICD-10-CM

## 2019-10-02 LAB
ALBUMIN SERPL-MCNC: 4.4 G/DL (ref 3.4–5)
ALP SERPL-CCNC: 76 IU/L (ref 35–126)
ALT SERPL-CCNC: 23 IU/L (ref 11–54)
ANION GAP SERPL CALC-SCNC: 10 MEQ/L (ref 3–15)
AST SERPL-CCNC: 25 IU/L (ref 15–41)
BILIRUB SERPL-MCNC: 0.8 MG/DL (ref 0.3–1.2)
BUN SERPL-MCNC: 15 MG/DL (ref 8–20)
CALCIUM SERPL-MCNC: 10.5 MG/DL (ref 8.9–10.3)
CHLORIDE SERPL-SCNC: 102 MEQ/L (ref 98–109)
CHOLEST SERPL-MCNC: 278 MG/DL
CO2 SERPL-SCNC: 29 MEQ/L (ref 22–32)
CREAT SERPL-MCNC: 0.7 MG/DL
GFR SERPL CREATININE-BSD FRML MDRD: >60 ML/MIN/1.73M*2
GLUCOSE SERPL-MCNC: 83 MG/DL (ref 70–99)
HDLC SERPL-MCNC: 70 MG/DL
HDLC SERPL: 4 {RATIO}
LDLC SERPL CALC-MCNC: 186 MG/DL
NONHDLC SERPL-MCNC: 208 MG/DL
POTASSIUM SERPL-SCNC: 4 MEQ/L (ref 3.6–5.1)
PROT SERPL-MCNC: 6.8 G/DL (ref 6–8.2)
SODIUM SERPL-SCNC: 141 MEQ/L (ref 136–144)
TRIGL SERPL-MCNC: 109 MG/DL (ref 30–149)

## 2019-10-02 PROCEDURE — 36415 COLL VENOUS BLD VENIPUNCTURE: CPT

## 2019-10-02 PROCEDURE — 80053 COMPREHEN METABOLIC PANEL: CPT

## 2019-10-02 PROCEDURE — 80061 LIPID PANEL: CPT

## 2019-10-04 ENCOUNTER — TELEPHONE (OUTPATIENT)
Dept: INTERNAL MEDICINE | Facility: CLINIC | Age: 54
End: 2019-10-04

## 2019-10-04 RX ORDER — HYDROCHLOROTHIAZIDE 25 MG/1
TABLET ORAL
Qty: 90 TABLET | Refills: 1 | Status: SHIPPED | OUTPATIENT
Start: 2019-10-04 | End: 2020-03-17

## 2019-10-04 NOTE — TELEPHONE ENCOUNTER
Disc labs. She has very high bad cholesterol and hypecalcemia. advised to f/u these results with pcp when she returns to clinic next week.

## 2019-10-07 ENCOUNTER — TELEPHONE (OUTPATIENT)
Dept: INTERNAL MEDICINE | Facility: CLINIC | Age: 54
End: 2019-10-07

## 2019-10-07 DIAGNOSIS — E78.5 HYPERLIPIDEMIA, UNSPECIFIED HYPERLIPIDEMIA TYPE: Primary | ICD-10-CM

## 2019-10-07 RX ORDER — ROSUVASTATIN CALCIUM 5 MG/1
5 TABLET, COATED ORAL DAILY
Qty: 90 TABLET | Refills: 1 | Status: SHIPPED | OUTPATIENT
Start: 2019-10-07 | End: 2020-03-11

## 2019-10-07 NOTE — TELEPHONE ENCOUNTER
Spoke with the patient and reviewed the lab results, her cholesterol is very elevated  We discussed that she will start rosuvastatin 5 mg p.o. daily reviewed use and side effects she is currently taking medication for toenail fungus and will finish this month I advised her to start the Crestor in November and she will repeat the blood work before she sees me in December

## 2019-10-15 ENCOUNTER — OFFICE VISIT (OUTPATIENT)
Dept: INTERNAL MEDICINE | Facility: CLINIC | Age: 54
End: 2019-10-15
Payer: MEDICARE

## 2019-10-15 VITALS
HEART RATE: 69 BPM | WEIGHT: 111 LBS | RESPIRATION RATE: 18 BRPM | OXYGEN SATURATION: 98 % | TEMPERATURE: 98.6 F | DIASTOLIC BLOOD PRESSURE: 70 MMHG | HEIGHT: 57 IN | SYSTOLIC BLOOD PRESSURE: 116 MMHG | BODY MASS INDEX: 23.95 KG/M2

## 2019-10-15 DIAGNOSIS — H92.02 OTALGIA OF LEFT EAR: ICD-10-CM

## 2019-10-15 DIAGNOSIS — J01.90 ACUTE SINUSITIS, RECURRENCE NOT SPECIFIED, UNSPECIFIED LOCATION: Primary | ICD-10-CM

## 2019-10-15 PROCEDURE — 99213 OFFICE O/P EST LOW 20 MIN: CPT | Performed by: INTERNAL MEDICINE

## 2019-10-15 RX ORDER — FLUTICASONE PROPIONATE 50 MCG
2 SPRAY, SUSPENSION (ML) NASAL DAILY
Qty: 1 BOTTLE | Refills: 1 | Status: SHIPPED | OUTPATIENT
Start: 2019-10-15 | End: 2019-11-06 | Stop reason: SDUPTHER

## 2019-10-15 RX ORDER — CIPROFLOXACIN AND DEXAMETHASONE 3; 1 MG/ML; MG/ML
2 SUSPENSION/ DROPS AURICULAR (OTIC) 2 TIMES DAILY
Qty: 7.5 ML | Refills: 0 | Status: SHIPPED | OUTPATIENT
Start: 2019-10-15 | End: 2019-12-17

## 2019-10-15 ASSESSMENT — ENCOUNTER SYMPTOMS
NECK PAIN: 1
FATIGUE: 0
ABDOMINAL PAIN: 0
HEADACHES: 0
COUGH: 0
BACK PAIN: 0
CHILLS: 0
FEVER: 0
HEMATURIA: 0
PALPITATIONS: 0
CONFUSION: 0
SHORTNESS OF BREATH: 0
DIZZINESS: 0
DYSURIA: 0
VOMITING: 0
SORE THROAT: 1
NAUSEA: 0
BLOOD IN STOOL: 0
DIARRHEA: 0
BRUISES/BLEEDS EASILY: 0
NUMBNESS: 0
EYE PAIN: 0

## 2019-10-15 NOTE — PROGRESS NOTES
Subjective      Patient ID: Rajwinder Wynne is a 53 y.o. female     Earache / Otalgia    There is pain in the left ear. This is a recurrent problem. The current episode started yesterday. There has been no fever. The pain is mild. Associated symptoms include neck pain and a sore throat. Pertinent negatives include no abdominal pain, coughing, diarrhea, headaches, rash or vomiting. Associated symptoms comments: sinsu and nasal conngestion. She has tried nothing for the symptoms.   feels tired and has had sinus and nasal congestion and discomfort    Past Medical History:   Diagnosis Date   • Allergic rhinitis    • Cerebral cavernoma     parietal cavernoma   • Cervical myelopathy (CMS/HCC)    • Cervical radiculopathy    • Cervicogenic headache    • Closed fracture of sesamoid bone of foot    • Cricopharyngeal dysphagia    • Fibroids    • Food allergy    • GERD (gastroesophageal reflux disease)    • Lipid disorder    • Nephrolithiasis    • Osteoporosis        Past Surgical History:   Procedure Laterality Date   • ANTERIOR FUSION CERVICAL SPINE      c6-7   • BUNIONECTOMY     • CRICOPHARYNGEAL MYOTOMY     • KNEE SURGERY     • LUMBAR FUSION      L5-S1   • MYOMECTOMY     • THROAT SURGERY     • TIBIAL SESAMOID EXCISION         Family History   Problem Relation Age of Onset   • Hyperlipidemia Biological Mother    • Hypertension Biological Mother    • Pulmonary fibrosis Biological Father    • Colon cancer Maternal Grandfather    • Breast cancer Mother's Sister    • Ovarian cancer Father's Sister        Social History     Socioeconomic History   • Marital status:      Spouse name: Not on file   • Number of children: Not on file   • Years of education: Not on file   • Highest education level: Not on file   Occupational History   • Not on file   Social Needs   • Financial resource strain: Not on file   • Food insecurity:     Worry: Not on file     Inability: Not on file   • Transportation needs:     Medical: Not on file      Non-medical: Not on file   Tobacco Use   • Smoking status: Never Smoker   • Smokeless tobacco: Never Used   Substance and Sexual Activity   • Alcohol use: No   • Drug use: No   • Sexual activity: Not on file   Lifestyle   • Physical activity:     Days per week: Not on file     Minutes per session: Not on file   • Stress: Not on file   Relationships   • Social connections:     Talks on phone: Not on file     Gets together: Not on file     Attends [employer] service: Not on file     Active member of club or organization: Not on file     Attends meetings of clubs or organizations: Not on file     Relationship status: Not on file   • Intimate partner violence:     Fear of current or ex partner: Not on file     Emotionally abused: Not on file     Physically abused: Not on file     Forced sexual activity: Not on file   Other Topics Concern   • Not on file   Social History Narrative   • Not on file       Allergies   Allergen Reactions   • Liriano Flavor Other (see comments)   • Shellfish Containing Products Anaphylaxis     Pumpkin seeds   • Adhesive      Steri strips   • [place] Other (see comments)   • Banana Other (see comments)     also allergic to almonds, berries   • Erythromycin    • Erythromycin Base Other (see comments)     GI Upset  Other reaction(s): GI side effects   • Iodine Other (see comments)     shell fish allergy   • Latex Other (see comments)     patient not sure   • Levonorgestrel-Ethinyl Estrad    • Naprelan    • Naproxen Other (see comments)     dysphagia throat burning   • Naproxen Sodium      Other reaction(s): lump in throat   • Penicillin G    • Penicillin V Potassium Hives   • Penicillins Hives   • Pineapple Other (see comments)   • Pumpkin Seed      Other reaction(s): throat gets tight   • Shellfish Derived Hives       Current Outpatient Medications   Medication Sig Dispense Refill   • b complex vitamins tablet Take 400 mg by mouth daily.     • cetirizine (ZyrTEC) 10 mg tablet Take 1 tablet by  "mouth daily.     • cholecalciferol, vitamin D3, (VITAMIN D3) 1,000 unit capsule take 1 tablet by oral route  every day     • ciprofloxacin-dexamethasone (CIPRODEX) 0.3-0.1 % otic suspension Administer 2 drops into each ear 2 (two) times a day for 7 days. 7.5 mL 0   • fluticasone propionate (FLONASE) 50 mcg/actuation nasal spray Administer 2 sprays into each nostril daily. 1 Bottle 1   • folic acid/multivit,iron, (CENTRUM ORAL) Take by mouth.     • gabapentin (NEURONTIN) 300 mg capsule Take 900 mg by mouth 3 (three) times a day.       • hydrochlorothiazide (HYDRODIURIL) 25 mg tablet TAKE 1 TABLET BY MOUTH EVERY DAY 90 tablet 1   • KLOR-CON M10 10 mEq CR tablet TAKE 1 TABLET BY MOUTH EVERY DAY 90 tablet 1   • meloxicam (MOBIC) 7.5 mg tablet Take 7.5 mg by mouth daily.     • pantoprazole (PROTONIX) 40 mg EC tablet TAKE 1 TABLET BY MOUTH EVERY DAY 90 tablet 0   • rosuvastatin (CRESTOR) 5 mg tablet Take 1 tablet (5 mg total) by mouth daily. 90 tablet 1     No current facility-administered medications for this visit.        Visit Vitals  /70   Pulse 69   Temp 37 °C (98.6 °F) (Oral)   Resp 18   Ht 1.448 m (4' 9\")   Wt 50.3 kg (111 lb)   SpO2 98%   BMI 24.02 kg/m²         The following have been reviewed and updated as appropriate in this visit:  Allergies  Meds  Problems       Review of Systems   Constitutional: Negative for chills, fatigue and fever.   HENT: Positive for ear pain and sore throat.    Eyes: Negative for pain and visual disturbance.   Respiratory: Negative for cough and shortness of breath.    Cardiovascular: Negative for chest pain, palpitations and leg swelling.   Gastrointestinal: Negative for abdominal pain, blood in stool, diarrhea, nausea and vomiting.   Genitourinary: Negative for dysuria and hematuria.   Musculoskeletal: Positive for neck pain. Negative for back pain.   Skin: Negative for rash.   Allergic/Immunologic: Negative for environmental allergies.   Neurological: Negative for " dizziness, numbness and headaches.   Hematological: Does not bruise/bleed easily.   Psychiatric/Behavioral: Negative for confusion.       Objective       Physical Exam   Constitutional: She is oriented to person, place, and time. She appears well-developed and well-nourished.   HENT:   Head: Normocephalic and atraumatic.   Nose: Nose normal.   Mouth/Throat: Oropharynx is clear and moist.   Mild congestion left ear canal      Maxillary and frontal sinus tenderness   Eyes: Pupils are equal, round, and reactive to light. Conjunctivae and EOM are normal.   Neck: Normal range of motion. Neck supple. No thyromegaly present.   Cardiovascular: Normal rate, regular rhythm, normal heart sounds and intact distal pulses.   Pulmonary/Chest: Effort normal and breath sounds normal. No respiratory distress. She exhibits no tenderness.   Abdominal: Soft. Bowel sounds are normal. There is no tenderness. There is no guarding.   Musculoskeletal: Normal range of motion. She exhibits no tenderness.   Lymphadenopathy:     She has no cervical adenopathy.   Neurological: She is alert and oriented to person, place, and time. No cranial nerve deficit or sensory deficit.   Tightness cervical muscles   Skin: Skin is warm and dry. No rash noted.   Psychiatric: She has a normal mood and affect. Her behavior is normal.   Nursing note and vitals reviewed.      Assessment/Plan     Otalgia of left ear  Symptomatic  Start ciprodex ear drops twice daily for 5 days  Take tylenol as needed  Drink lots of lfuids'  Follow up if not better    Acute sinusitis  Start flonase nasal spray daily for 4 weeks  Take mucinex 600 mg daily for 5 days  Rest and drink lots of fluids        Maya Tesfaye MD  10/15/2019  8:41 PM

## 2019-10-15 NOTE — PATIENT INSTRUCTIONS
PLAN  Start flonase daily for 2 weeks  Start ciprodex twice daily for 5 days  Flu vaccine next week

## 2019-10-16 NOTE — ASSESSMENT & PLAN NOTE
Start flonase nasal spray daily for 4 weeks  Take mucinex 600 mg daily for 5 days  Rest and drink lots of fluids

## 2019-10-16 NOTE — ASSESSMENT & PLAN NOTE
Symptomatic  Start ciprodex ear drops twice daily for 5 days  Take tylenol as needed  Drink lots of lfuids'  Follow up if not better

## 2019-10-23 ENCOUNTER — CLINICAL SUPPORT (OUTPATIENT)
Dept: INTERNAL MEDICINE | Facility: CLINIC | Age: 54
End: 2019-10-23
Payer: MEDICARE

## 2019-10-23 VITALS — TEMPERATURE: 98.4 F

## 2019-10-23 DIAGNOSIS — Z23 NEED FOR IMMUNIZATION AGAINST INFLUENZA: Primary | ICD-10-CM

## 2019-10-23 PROCEDURE — 90686 IIV4 VACC NO PRSV 0.5 ML IM: CPT | Performed by: INTERNAL MEDICINE

## 2019-10-23 PROCEDURE — G0008 ADMIN INFLUENZA VIRUS VAC: HCPCS | Performed by: INTERNAL MEDICINE

## 2019-11-07 ENCOUNTER — TELEPHONE (OUTPATIENT)
Dept: INTERNAL MEDICINE | Facility: CLINIC | Age: 54
End: 2019-11-07

## 2019-11-07 RX ORDER — FLUTICASONE PROPIONATE 50 MCG
SPRAY, SUSPENSION (ML) NASAL
Qty: 1 BOTTLE | Refills: 1 | Status: SHIPPED | OUTPATIENT
Start: 2019-11-07 | End: 2020-02-07 | Stop reason: SDUPTHER

## 2019-11-07 NOTE — TELEPHONE ENCOUNTER
Spoke with the patient and she told me that she has had sore throat low-grade fever since yesterday mild headache and dry cough I advised her to start Advil 400 mg 3 times daily with food for 48 hours continue to take the Mucinex and the Flonase nasal spray she will give me a call back tomorrow with an update

## 2019-11-07 NOTE — TELEPHONE ENCOUNTER
Pt called because she is very sick. She said for a few days now she's had a fever, cold chills, her throat and ears are bothering her and she has been taking advil. She'd like to be seen or call in a med for her. Please f/u with pt ph# [phone]

## 2019-11-11 RX ORDER — AZITHROMYCIN 250 MG/1
250 TABLET, FILM COATED ORAL DAILY
Qty: 6 TABLET | Refills: 0 | Status: SHIPPED | OUTPATIENT
Start: 2019-11-11 | End: 2019-12-17

## 2019-11-11 NOTE — TELEPHONE ENCOUNTER
Spoke with the patient and she told me that she started the Z-Bereket last week and just finished it and thinks her symptoms are still persisting, agreeing  that is a likely virus as we had discussed   advised her to take Advil only as needed and  drink lots of fluids  She requested a prescription for Z-Bereket to be sent to the pharmacy just in case she needs it for the future

## 2019-12-04 ENCOUNTER — TRANSCRIBE ORDERS (OUTPATIENT)
Dept: SCHEDULING | Age: 54
End: 2019-12-04

## 2019-12-04 DIAGNOSIS — Q92.2 CHROMOSOME XQ27.3-Q28 DUPLICATION SYNDROME: Primary | ICD-10-CM

## 2019-12-06 RX ORDER — FLUTICASONE PROPIONATE 50 MCG
SPRAY, SUSPENSION (ML) NASAL
Qty: 1 BOTTLE | Refills: 1 | Status: SHIPPED | OUTPATIENT
Start: 2019-12-06 | End: 2019-12-17

## 2019-12-09 ENCOUNTER — HOSPITAL ENCOUNTER (OUTPATIENT)
Dept: RADIOLOGY | Facility: HOSPITAL | Age: 54
Discharge: HOME | End: 2019-12-09
Attending: NURSE PRACTITIONER
Payer: MEDICARE

## 2019-12-09 DIAGNOSIS — Q92.2 CHROMOSOME XQ27.3-Q28 DUPLICATION SYNDROME: ICD-10-CM

## 2019-12-09 DIAGNOSIS — Q28.3 CAVERNOUS MALFORMATION: Primary | ICD-10-CM

## 2019-12-09 PROCEDURE — 70551 MRI BRAIN STEM W/O DYE: CPT

## 2019-12-12 ENCOUNTER — APPOINTMENT (OUTPATIENT)
Dept: LAB | Facility: HOSPITAL | Age: 54
End: 2019-12-12
Attending: INTERNAL MEDICINE
Payer: MEDICARE

## 2019-12-12 DIAGNOSIS — E78.5 HYPERLIPIDEMIA, UNSPECIFIED HYPERLIPIDEMIA TYPE: ICD-10-CM

## 2019-12-12 LAB
ALBUMIN SERPL-MCNC: 3.9 G/DL (ref 3.4–5)
ALP SERPL-CCNC: 88 IU/L (ref 35–126)
ALT SERPL-CCNC: 15 IU/L (ref 11–54)
ANION GAP SERPL CALC-SCNC: 13 MEQ/L (ref 3–15)
AST SERPL-CCNC: 22 IU/L (ref 15–41)
BILIRUB SERPL-MCNC: 0.7 MG/DL (ref 0.3–1.2)
BUN SERPL-MCNC: 15 MG/DL (ref 8–20)
CALCIUM SERPL-MCNC: 9.6 MG/DL (ref 8.9–10.3)
CHLORIDE SERPL-SCNC: 99 MEQ/L (ref 98–109)
CHOLEST SERPL-MCNC: 166 MG/DL
CO2 SERPL-SCNC: 27 MEQ/L (ref 22–32)
CREAT SERPL-MCNC: 0.7 MG/DL
GFR SERPL CREATININE-BSD FRML MDRD: >60 ML/MIN/1.73M*2
GLUCOSE SERPL-MCNC: 82 MG/DL (ref 70–99)
HDLC SERPL-MCNC: 66 MG/DL
HDLC SERPL: 2.5 {RATIO}
LDLC SERPL CALC-MCNC: 85 MG/DL
NONHDLC SERPL-MCNC: 100 MG/DL
POTASSIUM SERPL-SCNC: 3.8 MEQ/L (ref 3.6–5.1)
PROT SERPL-MCNC: 6.2 G/DL (ref 6–8.2)
SODIUM SERPL-SCNC: 139 MEQ/L (ref 136–144)
TRIGL SERPL-MCNC: 73 MG/DL (ref 30–149)

## 2019-12-12 PROCEDURE — 36415 COLL VENOUS BLD VENIPUNCTURE: CPT

## 2019-12-12 PROCEDURE — 80053 COMPREHEN METABOLIC PANEL: CPT

## 2019-12-12 PROCEDURE — 80061 LIPID PANEL: CPT

## 2019-12-17 ENCOUNTER — OFFICE VISIT (OUTPATIENT)
Dept: INTERNAL MEDICINE | Facility: CLINIC | Age: 54
End: 2019-12-17
Payer: MEDICARE

## 2019-12-17 VITALS
RESPIRATION RATE: 16 BRPM | HEART RATE: 76 BPM | TEMPERATURE: 99.7 F | HEIGHT: 57 IN | BODY MASS INDEX: 24.98 KG/M2 | OXYGEN SATURATION: 99 % | SYSTOLIC BLOOD PRESSURE: 100 MMHG | WEIGHT: 115.8 LBS | DIASTOLIC BLOOD PRESSURE: 64 MMHG

## 2019-12-17 DIAGNOSIS — Z91.013 ALLERGY TO SHELLFISH: ICD-10-CM

## 2019-12-17 DIAGNOSIS — E78.5 HYPERLIPIDEMIA, UNSPECIFIED HYPERLIPIDEMIA TYPE: ICD-10-CM

## 2019-12-17 DIAGNOSIS — I10 ESSENTIAL HYPERTENSION: ICD-10-CM

## 2019-12-17 DIAGNOSIS — Q28.3 CEREBRAL CAVERNOMA: ICD-10-CM

## 2019-12-17 DIAGNOSIS — H92.02 OTALGIA OF LEFT EAR: ICD-10-CM

## 2019-12-17 DIAGNOSIS — G95.9 CERVICAL MYELOPATHY (CMS/HCC): ICD-10-CM

## 2019-12-17 PROCEDURE — 99214 OFFICE O/P EST MOD 30 MIN: CPT | Performed by: INTERNAL MEDICINE

## 2019-12-17 RX ORDER — EPINEPHRINE 0.3 MG/.3ML
1 INJECTION SUBCUTANEOUS AS NEEDED
Qty: 1 EACH | Refills: 1 | Status: SHIPPED | OUTPATIENT
Start: 2019-12-17 | End: 2020-02-24 | Stop reason: SDUPTHER

## 2019-12-17 NOTE — PROGRESS NOTES
Subjective      Patient ID: Rajwinder Wynne is a 54 y.o. female     HPI       Here for follow up  Is seeing Dr Lim at [place] and is likely going to get left bunion surgery in January  Getting PT for the low back pain   Had an MRI of the brain and is due to see Dr. Trujillo at [place] for the history of cerebral cavernoma  She also has had postnasal drip and chronic discomfort in the left ear  Follow-up on test results  She has been taking the rosuvastatin daily    Past Medical History:   Diagnosis Date   • Allergic rhinitis    • Cerebral cavernoma     parietal cavernoma   • Cervical myelopathy (CMS/HCC)    • Cervical radiculopathy    • Cervicogenic headache    • Closed fracture of sesamoid bone of foot    • Cricopharyngeal dysphagia    • Fibroids    • Food allergy    • GERD (gastroesophageal reflux disease)    • Lipid disorder    • Nephrolithiasis    • Osteoporosis        Past Surgical History:   Procedure Laterality Date   • ANTERIOR FUSION CERVICAL SPINE      c6-7   • BUNIONECTOMY     • CRICOPHARYNGEAL MYOTOMY     • KNEE SURGERY     • LUMBAR FUSION      L5-S1   • MYOMECTOMY     • THROAT SURGERY     • TIBIAL SESAMOID EXCISION         Family History   Problem Relation Age of Onset   • Hyperlipidemia Biological Mother    • Hypertension Biological Mother    • Pulmonary fibrosis Biological Father    • Colon cancer Maternal Grandfather    • Breast cancer Mother's Sister    • Ovarian cancer Father's Sister        Social History     Socioeconomic History   • Marital status:      Spouse name: Not on file   • Number of children: Not on file   • Years of education: Not on file   • Highest education level: Not on file   Occupational History   • Not on file   Social Needs   • Financial resource strain: Not on file   • Food insecurity:     Worry: Not on file     Inability: Not on file   • Transportation needs:     Medical: Not on file     Non-medical: Not on file   Tobacco Use   • Smoking status: Never Smoker   • Smokeless  tobacco: Never Used   Substance and Sexual Activity   • Alcohol use: No   • Drug use: No   • Sexual activity: Not on file   Lifestyle   • Physical activity:     Days per week: Not on file     Minutes per session: Not on file   • Stress: Not on file   Relationships   • Social connections:     Talks on phone: Not on file     Gets together: Not on file     Attends Zoroastrianism service: Not on file     Active member of club or organization: Not on file     Attends meetings of clubs or organizations: Not on file     Relationship status: Not on file   • Intimate partner violence:     Fear of current or ex partner: Not on file     Emotionally abused: Not on file     Physically abused: Not on file     Forced sexual activity: Not on file   Other Topics Concern   • Not on file   Social History Narrative   • Not on file       Allergies   Allergen Reactions   • Liriano Flavor Other (see comments)   • Shellfish Containing Products Anaphylaxis     Pumpkin seeds   • Adhesive      Steri strips   • [place] Other (see comments)   • Banana Other (see comments)     also allergic to almonds, berries   • Erythromycin    • Erythromycin Base Other (see comments)     GI Upset  Other reaction(s): GI side effects   • Iodine Other (see comments)     shell fish allergy   • Latex Other (see comments)     patient not sure   • Levonorgestrel-Ethinyl Estrad    • Naprelan    • Naproxen Other (see comments)     dysphagia throat burning   • Naproxen Sodium      Other reaction(s): lump in throat   • Penicillin G    • Penicillin V Potassium Hives   • Penicillins Hives   • Pineapple Other (see comments)   • Pumpkin Seed      Other reaction(s): throat gets tight   • Shellfish Derived Hives       Current Outpatient Medications   Medication Sig Dispense Refill   • b complex vitamins tablet Take 400 mg by mouth daily.     • cetirizine (ZyrTEC) 10 mg tablet Take 1 tablet by mouth daily.     • cholecalciferol, vitamin D3, (VITAMIN D3) 1,000 unit capsule take 1  "tablet by oral route  every day     • fluticasone propionate (FLONASE) 50 mcg/actuation nasal spray SPRAY 2 SPRAYS INTO EACH NOSTRIL EVERY DAY 1 Bottle 1   • folic acid/multivit,iron, (CENTRUM ORAL) Take by mouth.     • gabapentin (NEURONTIN) 300 mg capsule Take 900 mg by mouth 3 (three) times a day.       • hydrochlorothiazide (HYDRODIURIL) 25 mg tablet TAKE 1 TABLET BY MOUTH EVERY DAY 90 tablet 1   • KLOR-CON M10 10 mEq CR tablet TAKE 1 TABLET BY MOUTH EVERY DAY 90 tablet 1   • meloxicam (MOBIC) 7.5 mg tablet Take 7.5 mg by mouth daily.     • pantoprazole (PROTONIX) 40 mg EC tablet TAKE 1 TABLET BY MOUTH EVERY DAY 90 tablet 0   • rosuvastatin (CRESTOR) 5 mg tablet Take 1 tablet (5 mg total) by mouth daily. 90 tablet 1   • EPINEPHrine (EPIPEN) 0.3 mg/0.3 mL injection syringe Inject 0.3 mL (0.3 mg total) into the thigh as needed for anaphylaxis. 1 each 1     No current facility-administered medications for this visit.        Visit Vitals  /64   Pulse 76   Temp 37.6 °C (99.7 °F) (Oral)   Resp 16   Ht 1.448 m (4' 9\")   Wt 52.5 kg (115 lb 12.8 oz)   SpO2 99%   BMI 25.06 kg/m²         The following have been reviewed and updated as appropriate in this visit:  Allergies  Meds  Problems       Review of Systems   Constitutional: Negative for chills, fatigue and fever.   HENT: Positive for postnasal drip. Negative for ear pain and sore throat.    Eyes: Negative for pain and visual disturbance.   Respiratory: Negative for cough and shortness of breath.    Cardiovascular: Negative for chest pain, palpitations and leg swelling.   Gastrointestinal: Negative for abdominal pain, blood in stool, diarrhea, nausea and vomiting.   Genitourinary: Negative for dysuria and hematuria.   Musculoskeletal: Positive for back pain and neck pain.   Skin: Negative for rash.   Allergic/Immunologic: Negative for environmental allergies.   Neurological: Negative for dizziness, numbness and headaches.   Hematological: Does not " bruise/bleed easily.   Psychiatric/Behavioral: Negative for confusion.       Objective       Physical Exam   Constitutional: She is oriented to person, place, and time. She appears well-developed and well-nourished.   HENT:   Head: Normocephalic and atraumatic.   Right Ear: External ear normal.   Left Ear: External ear normal.   Nose: Nose normal.   Mouth/Throat: Oropharynx is clear and moist.   Nasal turbinates swollen and congested  Left ear canal is clear and has no fluid redness or cerumen   Eyes: Pupils are equal, round, and reactive to light. Conjunctivae and EOM are normal.   Neck: Normal range of motion. Neck supple. No thyromegaly present.   Cardiovascular: Normal rate, regular rhythm, normal heart sounds and intact distal pulses.   Pulmonary/Chest: Effort normal and breath sounds normal. No respiratory distress. She exhibits no tenderness.   Abdominal: Soft. Bowel sounds are normal. There is no tenderness. There is no guarding.   Musculoskeletal: Normal range of motion. She exhibits no tenderness.   Tightness of the cervical muscles  Tenderness and tightness of the lumbar muscles tightness of the hip flexors   Lymphadenopathy:     She has no cervical adenopathy.   Neurological: She is alert and oriented to person, place, and time. No cranial nerve deficit or sensory deficit.   Skin: Skin is warm and dry. No rash noted.   Psychiatric: She has a normal mood and affect. Her behavior is normal.   Nursing note and vitals reviewed.      Assessment/Plan     Hyperlipidemia  Much improved  Reviewed the test results in detail her  LDL is down by 100 points  She has no side effects  Advised her and encouraged her to continue the rosuvastatin 5 mg p.o. daily she will repeat the blood work in 3 months    Essential hypertension  BP is stable  She will cont HCTZ 12.5 mg daily    Cerebral cavernoma  Stable  She had MRI of the brain which showed that the cavernoma is stable  She is due to see Dr. Trujillo at [place] for  follow-up    Cervical myelopathy (CMS/HCC)  Persisting symptoms  She will continue gabapentin 600 mg three times daily  Follow up with neurology    Otalgia of left ear  Likely due to chronic sinusitis and eustachian tube dysfunction  Advised her to continue the cetirizine 10 mg p.o. daily and start Flonase daily for 2 weeks    Allergy to shellfish  She has known allergy to shellfish and requesting a refill on the EpiPen  Prescription sent to the pharmacy        Maya Tesfaye MD  12/18/2019  8:10 AM

## 2019-12-17 NOTE — PATIENT INSTRUCTIONS
PLAN      Continue mucinex for few days  Drink lots of fluids  BNRAT diet if the diet  Cont crestor 5 mg daily  Get blood work in may

## 2019-12-18 PROBLEM — Z91.013 ALLERGY TO SHELLFISH: Status: ACTIVE | Noted: 2019-12-18

## 2019-12-18 ASSESSMENT — ENCOUNTER SYMPTOMS
BLOOD IN STOOL: 0
FATIGUE: 0
FEVER: 0
CHILLS: 0
HEMATURIA: 0
NAUSEA: 0
NUMBNESS: 0
ABDOMINAL PAIN: 0
SORE THROAT: 0
CONFUSION: 0
PALPITATIONS: 0
SHORTNESS OF BREATH: 0
NECK PAIN: 1
BACK PAIN: 1
VOMITING: 0
BRUISES/BLEEDS EASILY: 0
COUGH: 0
DIZZINESS: 0
EYE PAIN: 0
DYSURIA: 0
HEADACHES: 0
DIARRHEA: 0

## 2019-12-18 NOTE — ASSESSMENT & PLAN NOTE
Stable  She had MRI of the brain which showed that the cavernoma is stable  She is due to see Dr. Trujillo at [place] for follow-up

## 2019-12-18 NOTE — ASSESSMENT & PLAN NOTE
Likely due to chronic sinusitis and eustachian tube dysfunction  Advised her to continue the cetirizine 10 mg p.o. daily and start Flonase daily for 2 weeks

## 2019-12-18 NOTE — ASSESSMENT & PLAN NOTE
She has known allergy to shellfish and requesting a refill on the EpiPen  Prescription sent to the pharmacy

## 2019-12-18 NOTE — ASSESSMENT & PLAN NOTE
Much improved  Reviewed the test results in detail her  LDL is down by 100 points  She has no side effects  Advised her and encouraged her to continue the rosuvastatin 5 mg p.o. daily she will repeat the blood work in 3 months

## 2019-12-19 RX ORDER — POTASSIUM CHLORIDE 750 MG/1
TABLET, EXTENDED RELEASE ORAL
Qty: 90 TABLET | Refills: 1 | Status: SHIPPED | OUTPATIENT
Start: 2019-12-19 | End: 2020-06-02

## 2019-12-30 ENCOUNTER — APPOINTMENT (OUTPATIENT)
Dept: LAB | Facility: HOSPITAL | Age: 54
End: 2019-12-30
Attending: ORTHOPAEDIC SURGERY
Payer: MEDICARE

## 2019-12-30 ENCOUNTER — TRANSCRIBE ORDERS (OUTPATIENT)
Dept: LAB | Facility: HOSPITAL | Age: 54
End: 2019-12-30

## 2019-12-30 DIAGNOSIS — M20.12 HALLUX VALGUS (ACQUIRED), LEFT FOOT: ICD-10-CM

## 2019-12-30 DIAGNOSIS — Z01.818 ENCOUNTER FOR OTHER PREPROCEDURAL EXAMINATION: ICD-10-CM

## 2019-12-30 DIAGNOSIS — M20.22 HALLUX RIGIDUS, LEFT FOOT: ICD-10-CM

## 2019-12-30 DIAGNOSIS — M77.42 METATARSALGIA, LEFT FOOT: ICD-10-CM

## 2019-12-30 DIAGNOSIS — M77.41 METATARSALGIA, RIGHT FOOT: ICD-10-CM

## 2019-12-30 DIAGNOSIS — M77.41 METATARSALGIA, RIGHT FOOT: Primary | ICD-10-CM

## 2019-12-30 LAB
ANION GAP SERPL CALC-SCNC: 7 MEQ/L (ref 3–15)
BUN SERPL-MCNC: 19 MG/DL (ref 8–20)
CALCIUM SERPL-MCNC: 10.1 MG/DL (ref 8.9–10.3)
CHLORIDE SERPL-SCNC: 102 MEQ/L (ref 98–109)
CO2 SERPL-SCNC: 30 MEQ/L (ref 22–32)
CREAT SERPL-MCNC: 0.7 MG/DL
ERYTHROCYTE [DISTWIDTH] IN BLOOD BY AUTOMATED COUNT: 12.9 % (ref 11.7–14.4)
GFR SERPL CREATININE-BSD FRML MDRD: >60 ML/MIN/1.73M*2
GLUCOSE SERPL-MCNC: 96 MG/DL (ref 70–99)
HCT VFR BLDCO AUTO: 42.9 %
HGB BLD-MCNC: 14.5 G/DL (ref 11.8–15.7)
MCH RBC QN AUTO: 33.2 PG (ref 28–33.2)
MCHC RBC AUTO-ENTMCNC: 33.8 G/DL (ref 32.2–35.5)
MCV RBC AUTO: 98.2 FL (ref 83–98)
PDW BLD AUTO: 11.2 FL (ref 9.4–12.3)
PLATELET # BLD AUTO: 342 K/UL
POTASSIUM SERPL-SCNC: 4.2 MEQ/L (ref 3.6–5.1)
RBC # BLD AUTO: 4.37 M/UL (ref 3.93–5.22)
SODIUM SERPL-SCNC: 139 MEQ/L (ref 136–144)
WBC # BLD AUTO: 7.29 K/UL

## 2019-12-30 PROCEDURE — 82310 ASSAY OF CALCIUM: CPT

## 2019-12-30 PROCEDURE — 85027 COMPLETE CBC AUTOMATED: CPT

## 2019-12-30 PROCEDURE — 36415 COLL VENOUS BLD VENIPUNCTURE: CPT

## 2020-01-17 ENCOUNTER — HOSPITAL ENCOUNTER (EMERGENCY)
Facility: HOSPITAL | Age: 55
Discharge: HOME | End: 2020-01-17
Attending: EMERGENCY MEDICINE
Payer: MEDICARE

## 2020-01-17 ENCOUNTER — APPOINTMENT (EMERGENCY)
Dept: RADIOLOGY | Facility: HOSPITAL | Age: 55
End: 2020-01-17
Attending: EMERGENCY MEDICINE
Payer: MEDICARE

## 2020-01-17 VITALS
RESPIRATION RATE: 18 BRPM | HEART RATE: 77 BPM | OXYGEN SATURATION: 97 % | DIASTOLIC BLOOD PRESSURE: 81 MMHG | SYSTOLIC BLOOD PRESSURE: 148 MMHG | TEMPERATURE: 99.1 F

## 2020-01-17 DIAGNOSIS — S49.91XA ARM INJURY, RIGHT, INITIAL ENCOUNTER: Primary | ICD-10-CM

## 2020-01-17 PROCEDURE — 73130 X-RAY EXAM OF HAND: CPT | Mod: RT

## 2020-01-17 PROCEDURE — 73110 X-RAY EXAM OF WRIST: CPT | Mod: RT

## 2020-01-17 PROCEDURE — 99283 EMERGENCY DEPT VISIT LOW MDM: CPT | Mod: 25

## 2020-01-17 ASSESSMENT — ENCOUNTER SYMPTOMS
STIFFNESS: 1
EXTREMITY NUMBNESS: 0

## 2020-01-17 NOTE — ED PROVIDER NOTES
HPI     Chief Complaint   Patient presents with   • Upper Extremity Issue       55yo female, hx of osteoporosis, presenting to the ER with concern for right hand/wrist injury that occurred PTA. Pt says she tripped and fell, catching her fall on outstretched right arm. Pt also hit her chin on furniture but denies LOC, n/v. Pt reports chronic neck pain, no change.       History provided by:  Patient   used: No    Upper Extremity Issue   Location:  Wrist and hand  Wrist location:  R wrist  Hand location:  R hand  Injury: yes    Mechanism of injury: fall    Fall:     Fall occurred:  Walking and tripped    Impact surface:  Furniture    Point of impact: outstretched right arm.    Entrapped after fall: no    Pain details:     Quality:  Aching    Radiates to:  Does not radiate    Severity:  Mild    Onset quality:  Sudden    Timing:  Constant    Progression:  Unchanged  Handedness:  Right-handed  Dislocation: no    Foreign body present:  No foreign bodies  Prior injury to area:  No  Relieved by:  None tried  Worsened by:  Movement  Associated symptoms: stiffness and swelling    Associated symptoms: no decreased range of motion, no numbness and no tingling         Patient History     Past Medical History:   Diagnosis Date   • Allergic rhinitis    • Cerebral cavernoma     parietal cavernoma   • Cervical myelopathy (CMS/HCC)    • Cervical radiculopathy    • Cervicogenic headache    • Closed fracture of sesamoid bone of foot    • Cricopharyngeal dysphagia    • Fibroids    • Food allergy    • GERD (gastroesophageal reflux disease)    • Lipid disorder    • Nephrolithiasis    • Osteoporosis        Past Surgical History:   Procedure Laterality Date   • ANTERIOR FUSION CERVICAL SPINE      c6-7   • BUNIONECTOMY     • CRICOPHARYNGEAL MYOTOMY     • KNEE SURGERY     • LUMBAR FUSION      L5-S1   • MYOMECTOMY     • THROAT SURGERY     • TIBIAL SESAMOID EXCISION         Family History   Problem Relation Age of Onset   •  Hyperlipidemia Biological Mother    • Hypertension Biological Mother    • Pulmonary fibrosis Biological Father    • Colon cancer Maternal Grandfather    • Breast cancer Mother's Sister    • Ovarian cancer Father's Sister        Social History     Tobacco Use   • Smoking status: Never Smoker   • Smokeless tobacco: Never Used   Substance Use Topics   • Alcohol use: No   • Drug use: No       Systems Reviewed from Nursing Triage:          Review of Systems     Review of Systems   Musculoskeletal: Positive for stiffness.        Physical Exam     ED Triage Vitals   Temp Heart Rate Resp BP SpO2   01/17/20 1456 01/17/20 1453 01/17/20 1453 01/17/20 1453 01/17/20 1453   37.3 °C (99.1 °F) 77 18 (!) 148/81 97 %      Temp Source Heart Rate Source Patient Position BP Location FiO2 (%) (Set)   01/17/20 1456 -- 01/17/20 1453 01/17/20 1453 --   Tympanic  Sitting Left upper arm                      Patient Vitals for the past 24 hrs:   BP Temp Temp src Pulse Resp SpO2   01/17/20 1456 -- 37.3 °C (99.1 °F) Tympanic -- -- --   01/17/20 1453 (!) 148/81 -- -- 77 18 97 %                                       Physical Exam   Constitutional: She is oriented to person, place, and time. She appears well-developed and well-nourished.   HENT:   Head: Normocephalic.   Pt with small ecchymosis to left side of chin   Musculoskeletal: Normal range of motion.        Right wrist: She exhibits tenderness. She exhibits normal range of motion and no bony tenderness.        Right hand: She exhibits tenderness. She exhibits normal range of motion, no bony tenderness and normal capillary refill.        Hands:  Neurological: She is alert and oriented to person, place, and time.   Skin: Skin is warm and dry. Capillary refill takes less than 2 seconds.   Psychiatric: Her mood appears anxious.   Nursing note and vitals reviewed.           Procedures    ED Course & MDM     Labs Reviewed - No data to display    X-RAY WRIST RIGHT 3+ VIEWS   ED Interpretation   nad       X-RAY HAND RIGHT 3+ VIEWS   ED Interpretation   nad                  MDM         Clinical Impressions as of Jan 17 1551   Arm injury, right, initial encounter - right wrist and hand        Junior Beltre, JESUS COFFMAN  01/17/20 6379

## 2020-01-17 NOTE — DISCHARGE INSTRUCTIONS
Take Tylenol as needed for pain. You may wear the splint for comfort but can also use your hand freely. Follow up with your primary care provider.    Return to the ED for worsening of symptoms or any problems or concerns.  It is very important to follow up with your healthcare provider for re-evaluation.

## 2020-01-17 NOTE — ED ATTESTATION NOTE
I have personally seen and examined the patient.  I reviewed and agree with physician assistant / nurse practitioner’s assessment and plan of care, with the following exceptions: None  My examination, assessment, and plan of care of Rajwinder Wynne is as follows:       Fell  Exam tender Thenar eminence  Non tender R wrist  No snuff box tenderenss              Hussain Aguilar, DO  01/17/20 1549

## 2020-01-20 ENCOUNTER — TELEPHONE (OUTPATIENT)
Dept: INTERNAL MEDICINE | Facility: CLINIC | Age: 55
End: 2020-01-20

## 2020-02-07 RX ORDER — FLUTICASONE PROPIONATE 50 MCG
1 SPRAY, SUSPENSION (ML) NASAL DAILY
Qty: 1 BOTTLE | Refills: 1 | Status: SHIPPED | OUTPATIENT
Start: 2020-02-07 | End: 2020-06-25

## 2020-02-24 ENCOUNTER — TELEPHONE (OUTPATIENT)
Dept: INTERNAL MEDICINE | Facility: CLINIC | Age: 55
End: 2020-02-24

## 2020-02-24 ENCOUNTER — OFFICE VISIT (OUTPATIENT)
Dept: INTERNAL MEDICINE | Facility: CLINIC | Age: 55
End: 2020-02-24
Payer: MEDICARE

## 2020-02-24 VITALS
HEIGHT: 57 IN | TEMPERATURE: 98.7 F | HEART RATE: 80 BPM | RESPIRATION RATE: 16 BRPM | DIASTOLIC BLOOD PRESSURE: 74 MMHG | SYSTOLIC BLOOD PRESSURE: 120 MMHG | OXYGEN SATURATION: 99 % | BODY MASS INDEX: 25.06 KG/M2

## 2020-02-24 DIAGNOSIS — G95.9 CERVICAL MYELOPATHY (CMS/HCC): ICD-10-CM

## 2020-02-24 DIAGNOSIS — I10 ESSENTIAL HYPERTENSION: ICD-10-CM

## 2020-02-24 DIAGNOSIS — Z91.013 ALLERGY TO SHELLFISH: ICD-10-CM

## 2020-02-24 DIAGNOSIS — J01.90 ACUTE SINUSITIS, RECURRENCE NOT SPECIFIED, UNSPECIFIED LOCATION: ICD-10-CM

## 2020-02-24 PROCEDURE — 99214 OFFICE O/P EST MOD 30 MIN: CPT | Performed by: INTERNAL MEDICINE

## 2020-02-24 RX ORDER — DOXYCYCLINE HYCLATE 100 MG
100 TABLET ORAL 2 TIMES DAILY
Qty: 14 TABLET | Refills: 0 | Status: SHIPPED | OUTPATIENT
Start: 2020-02-24 | End: 2020-03-02

## 2020-02-24 RX ORDER — EPINEPHRINE 0.3 MG/.3ML
1 INJECTION SUBCUTANEOUS AS NEEDED
Qty: 1 EACH | Refills: 3 | Status: SHIPPED | OUTPATIENT
Start: 2020-02-24 | End: 2020-03-25

## 2020-02-24 RX ORDER — VALACYCLOVIR HYDROCHLORIDE 500 MG/1
500 TABLET, FILM COATED ORAL
COMMUNITY
Start: 2020-02-12 | End: 2021-04-05

## 2020-02-24 ASSESSMENT — ENCOUNTER SYMPTOMS
FEVER: 0
BRUISES/BLEEDS EASILY: 0
DIARRHEA: 0
VOMITING: 0
SORE THROAT: 1
COUGH: 1
EYE PAIN: 0
PALPITATIONS: 0
CONFUSION: 0
CHILLS: 1
HEADACHES: 0
DYSURIA: 0
NAUSEA: 0
HEMATURIA: 0
SHORTNESS OF BREATH: 0
BACK PAIN: 0
DIZZINESS: 0
BLOOD IN STOOL: 0
ABDOMINAL PAIN: 0
FATIGUE: 0
NUMBNESS: 0

## 2020-02-24 NOTE — PROGRESS NOTES
Subjective      Patient ID: Rajwinder Wynne is a 54 y.o. female     Cough   This is a new problem. The current episode started in the past 7 days. The problem has been gradually worsening. The cough is non-productive. Associated symptoms include chills, ear congestion, nasal congestion, postnasal drip and a sore throat. Pertinent negatives include no chest pain, ear pain, fever, headaches, rash or shortness of breath. The symptoms are aggravated by lying down. She has tried OTC cough suppressant for the symptoms. Her past medical history is significant for environmental allergies.     Has had cough,chest congestion,nasal congestion and loss of voice  Sx worsening and OTC meds are not helping  She had foot surgery 5 weeks ago  Also needs refill on epipen    Past Medical History:   Diagnosis Date   • Allergic rhinitis    • Cerebral cavernoma     parietal cavernoma   • Cervical myelopathy (CMS/HCC)    • Cervical radiculopathy    • Cervicogenic headache    • Closed fracture of sesamoid bone of foot    • Cricopharyngeal dysphagia    • Fibroids    • Food allergy    • GERD (gastroesophageal reflux disease)    • Lipid disorder    • Nephrolithiasis    • Osteoporosis        Past Surgical History:   Procedure Laterality Date   • ANTERIOR FUSION CERVICAL SPINE      c6-7   • BUNIONECTOMY     • CRICOPHARYNGEAL MYOTOMY     • KNEE SURGERY     • LUMBAR FUSION      L5-S1   • MYOMECTOMY     • THROAT SURGERY     • TIBIAL SESAMOID EXCISION         Family History   Problem Relation Age of Onset   • Hyperlipidemia Biological Mother    • Hypertension Biological Mother    • Pulmonary fibrosis Biological Father    • Colon cancer Maternal Grandfather    • Breast cancer Mother's Sister    • Ovarian cancer Father's Sister        Social History     Socioeconomic History   • Marital status:      Spouse name: Not on file   • Number of children: Not on file   • Years of education: Not on file   • Highest education level: Not on file    Occupational History   • Not on file   Social Needs   • Financial resource strain: Not on file   • Food insecurity:     Worry: Not on file     Inability: Not on file   • Transportation needs:     Medical: Not on file     Non-medical: Not on file   Tobacco Use   • Smoking status: Never Smoker   • Smokeless tobacco: Never Used   Substance and Sexual Activity   • Alcohol use: No   • Drug use: No   • Sexual activity: Not on file   Lifestyle   • Physical activity:     Days per week: Not on file     Minutes per session: Not on file   • Stress: Not on file   Relationships   • Social connections:     Talks on phone: Not on file     Gets together: Not on file     Attends Orthodoxy service: Not on file     Active member of club or organization: Not on file     Attends meetings of clubs or organizations: Not on file     Relationship status: Not on file   • Intimate partner violence:     Fear of current or ex partner: Not on file     Emotionally abused: Not on file     Physically abused: Not on file     Forced sexual activity: Not on file   Other Topics Concern   • Not on file   Social History Narrative   • Not on file       Allergies   Allergen Reactions   • Liriano Flavor Other (see comments)   • Shellfish Containing Products Anaphylaxis     Pumpkin seeds   • Adhesive      Steri strips   • Edna Other (see comments)   • Banana Other (see comments)     also allergic to almonds, berries   • Erythromycin    • Erythromycin Base Other (see comments)     GI Upset  Other reaction(s): GI side effects   • Iodine Other (see comments)     shell fish allergy   • Latex Other (see comments)     patient not sure   • Levonorgestrel-Ethinyl Estrad    • Naprelan    • Naproxen Other (see comments)     dysphagia throat burning   • Naproxen Sodium      Other reaction(s): lump in throat   • Penicillin G    • Penicillin V Potassium Hives   • Penicillins Hives   • Pineapple Other (see comments)   • Pumpkin Seed      Other reaction(s): throat gets  "tight   • Shellfish Derived Hives       Current Outpatient Medications   Medication Sig Dispense Refill   • b complex vitamins tablet Take 400 mg by mouth daily.     • cetirizine (ZyrTEC) 10 mg tablet Take 1 tablet by mouth daily.     • cholecalciferol, vitamin D3, (VITAMIN D3) 1,000 unit capsule take 1 tablet by oral route  every day     • EPINEPHrine (EPIPEN) 0.3 mg/0.3 mL injection syringe Inject 0.3 mL (0.3 mg total) into the thigh as needed for anaphylaxis. 1 each 3   • fluticasone propionate (FLONASE) 50 mcg/actuation nasal spray Administer 1 spray into each nostril daily. 1 Bottle 1   • folic acid/multivit,iron, (CENTRUM ORAL) Take by mouth.     • gabapentin (NEURONTIN) 300 mg capsule Take 900 mg by mouth 3 (three) times a day.       • hydrochlorothiazide (HYDRODIURIL) 25 mg tablet TAKE 1 TABLET BY MOUTH EVERY DAY 90 tablet 1   • KLOR-CON M10 10 mEq CR tablet TAKE 1 TABLET BY MOUTH EVERY DAY 90 tablet 1   • meloxicam (MOBIC) 7.5 mg tablet Take 7.5 mg by mouth daily.     • pantoprazole (PROTONIX) 40 mg EC tablet TAKE 1 TABLET BY MOUTH EVERY DAY 90 tablet 0   • rosuvastatin (CRESTOR) 5 mg tablet Take 1 tablet (5 mg total) by mouth daily. 90 tablet 1   • valACYclovir (VALTREX) 500 mg tablet Take 500 mg by mouth every 12 (twelve) hours.     • doxycycline hyclate (VIBRA-TABS) 100 mg tablet Take 1 tablet (100 mg total) by mouth 2 (two) times a day for 7 days. 14 tablet 0     No current facility-administered medications for this visit.        Visit Vitals  /74   Pulse 80   Temp 37.1 °C (98.7 °F) (Oral)   Resp 16   Ht 1.448 m (4' 9\")   SpO2 99%   BMI 25.06 kg/m²         The following have been reviewed and updated as appropriate in this visit:  Allergies  Meds  Problems       Review of Systems   Constitutional: Positive for chills. Negative for fatigue and fever.   HENT: Positive for postnasal drip and sore throat. Negative for ear pain.    Eyes: Negative for pain and visual disturbance.   Respiratory: " Positive for cough. Negative for shortness of breath.    Cardiovascular: Negative for chest pain, palpitations and leg swelling.   Gastrointestinal: Negative for abdominal pain, blood in stool, diarrhea, nausea and vomiting.   Genitourinary: Negative for dysuria and hematuria.   Musculoskeletal: Negative for back pain.   Skin: Negative for rash.   Allergic/Immunologic: Positive for environmental allergies.   Neurological: Negative for dizziness, numbness and headaches.   Hematological: Does not bruise/bleed easily.   Psychiatric/Behavioral: Negative for confusion.       Objective       Physical Exam   Constitutional: She is oriented to person, place, and time. She appears well-developed and well-nourished.   Looks tired   HENT:   Head: Normocephalic and atraumatic.   Right Ear: External ear normal.   Left Ear: External ear normal.   Nose: Nose normal.   Maxillary and frontal sinus tenderness  Nasal turbinates swollen and congested  Discolored postnasal drip  Pharyngeal erythema   Eyes: Pupils are equal, round, and reactive to light. Conjunctivae and EOM are normal.   Neck: Normal range of motion. Neck supple. No thyromegaly present.   Swollen and tender submandibular glands   Cardiovascular: Normal rate, regular rhythm, normal heart sounds and intact distal pulses.   Pulmonary/Chest: Effort normal and breath sounds normal. No respiratory distress. She exhibits no tenderness.   Abdominal: Soft. Bowel sounds are normal. There is no tenderness. There is no guarding.   Musculoskeletal: Normal range of motion. She exhibits no tenderness.   Foot in soft wrap    Lymphadenopathy:     She has no cervical adenopathy.   Neurological: She is alert and oriented to person, place, and time. No cranial nerve deficit or sensory deficit.   Skin: Skin is warm and dry. No rash noted.   Psychiatric: She has a normal mood and affect. Her behavior is normal.   Nursing note and vitals reviewed.      Assessment/Plan     Acute  sinusitis  Worsening and persisting symptoms  Start doxycycline 100 mg twice daily with food for 7 days  Start fluticasone nasal spray, 2 sprays in each nostril daily for 4 weeks  Continue Mucinex every other day for 7 days  Rest and drink lots of fluids she will call me with an update next week    Cervical myelopathy (CMS/HCC)  Persisting symptoms  She will continue gabapentin 600 mg three times daily  Follow up with neurology    Essential hypertension  BP is stable  She will cont HCTZ 12.5 mg daily    Allergy to shellfish  She has known allergy to shellfish and requesting a refill on the EpiPen  Prescription sent to the pharmacy        Maya Tesfaye MD  2/24/2020  9:12 PM

## 2020-02-24 NOTE — TELEPHONE ENCOUNTER
Pt called stating she is having body ache's, a cough with some mucus and is not able to speak very well. States she would be more comfortable seeing you.

## 2020-02-24 NOTE — PATIENT INSTRUCTIONS
PLAN      Start doxycycline 100 mg twice daily with food for 7 days  Cont flonase nasal spray  Use the mucinex every other day  Rest and drink lots of fluids  Cough drops

## 2020-02-25 NOTE — ASSESSMENT & PLAN NOTE
Persisting symptoms  She will continue gabapentin 600 mg three times daily  Follow up with neurology   Type 2 diabetes mellitus with hyperglycemia, with long-term current use of insulin [E11.65, Z79.4]. Contacted [facility], [phone], for qty authorization. Metformin er 500 mg. Qty 120/30. Case ID [id number].

## 2020-02-25 NOTE — ASSESSMENT & PLAN NOTE
Worsening and persisting symptoms  Start doxycycline 100 mg twice daily with food for 7 days  Start fluticasone nasal spray, 2 sprays in each nostril daily for 4 weeks  Continue Mucinex every other day for 7 days  Rest and drink lots of fluids she will call me with an update next week

## 2020-03-11 RX ORDER — ROSUVASTATIN CALCIUM 5 MG/1
TABLET, COATED ORAL
Qty: 90 TABLET | Refills: 1 | Status: SHIPPED | OUTPATIENT
Start: 2020-03-11 | End: 2020-08-31 | Stop reason: SDUPTHER

## 2020-03-17 RX ORDER — HYDROCHLOROTHIAZIDE 25 MG/1
TABLET ORAL
Qty: 90 TABLET | Refills: 1 | Status: SHIPPED | OUTPATIENT
Start: 2020-03-17 | End: 2020-07-03

## 2020-04-21 ENCOUNTER — TELEPHONE (OUTPATIENT)
Dept: INTERNAL MEDICINE | Facility: CLINIC | Age: 55
End: 2020-04-21

## 2020-04-21 NOTE — TELEPHONE ENCOUNTER
Patient has appt with you on 05/04/20 that she changed to a Telemed.  She wants to know does she have to get blood work done before the appt (she is uneasy about going anywhere at this time)..    Please advise

## 2020-05-04 ENCOUNTER — TELEMEDICINE (OUTPATIENT)
Dept: INTERNAL MEDICINE | Facility: CLINIC | Age: 55
End: 2020-05-04
Payer: MEDICARE

## 2020-05-04 DIAGNOSIS — E55.9 VITAMIN D DEFICIENCY: ICD-10-CM

## 2020-05-04 DIAGNOSIS — I10 ESSENTIAL HYPERTENSION: ICD-10-CM

## 2020-05-04 DIAGNOSIS — Z87.09 HISTORY OF URI (UPPER RESPIRATORY INFECTION): ICD-10-CM

## 2020-05-04 DIAGNOSIS — E78.5 HYPERLIPIDEMIA, UNSPECIFIED HYPERLIPIDEMIA TYPE: ICD-10-CM

## 2020-05-04 DIAGNOSIS — G95.9 CERVICAL MYELOPATHY (CMS/HCC): ICD-10-CM

## 2020-05-04 PROCEDURE — 99442 PR PHYS/QHP TELEPHONE EVALUATION 11-20 MIN: CPT | Performed by: INTERNAL MEDICINE

## 2020-05-04 ASSESSMENT — ENCOUNTER SYMPTOMS
COUGH: 0
EYE PAIN: 0
BLOOD IN STOOL: 0
CHILLS: 0
NECK PAIN: 1
FATIGUE: 0
SORE THROAT: 0
NUMBNESS: 0
HEADACHES: 0
BACK PAIN: 1
BRUISES/BLEEDS EASILY: 0
HEMATURIA: 0
FEVER: 0
VOMITING: 0
ABDOMINAL PAIN: 0
PALPITATIONS: 0
SHORTNESS OF BREATH: 0
DYSURIA: 0
DIZZINESS: 0
CONFUSION: 0
DIARRHEA: 0
NAUSEA: 0

## 2020-05-04 NOTE — PROGRESS NOTES
Verification of Patient Location:  The patient affirms they are currently located in the following state: [place]    Request for Consent:   Audio Only Encounter   You and I are about to have a telemedicine check-in or visit. This is allowed because you have requested it. This telemedicine visit will be billed to your health insurance or you, if you are self-insured. You understand you will be responsible for any copayments or coinsurances that apply to your telemedicine visit. Before starting our telemedicine visit, I am required to get your consent for this virtual check-in or visit by telemedicine. Do you consent?    Patient Response to Request for Consent:  Yes      Visit Documentation:  Subjective     Patient ID: Rajwinder Wynne is a 54 y.o. female.  1965      HPI       Spoke with the  Patient regarding  chronic conditions  and also ongoing issues  She was sick in february with sinusitis and cough  Took doxycycline that I had prescribed which helped  She is requesting blood work including COVID IgG  She has been taking the medications  Discussed her concerns regarding her mother  She gets very nervous every time she gets sore throat  Has been trying to stay home and follow social distancing     The following have been reviewed and updated as appropriate in this visit:  Tobacco  Allergies  Meds  Problems  Med Hx  Surg Hx  Fam Hx  Soc Hx        Review of Systems   Constitutional: Negative for chills, fatigue and fever.   HENT: Negative for ear pain and sore throat.    Eyes: Negative for pain and visual disturbance.   Respiratory: Negative for cough and shortness of breath.    Cardiovascular: Negative for chest pain, palpitations and leg swelling.   Gastrointestinal: Negative for abdominal pain, blood in stool, diarrhea, nausea and vomiting.   Genitourinary: Negative for dysuria and hematuria.   Musculoskeletal: Positive for back pain and neck pain.   Skin: Negative for rash.    Allergic/Immunologic: Negative for environmental allergies.   Neurological: Negative for dizziness, numbness and headaches.   Hematological: Does not bruise/bleed easily.   Psychiatric/Behavioral: Negative for confusion.         Assessment/Plan   Diagnoses and all orders for this visit:    History of URI (upper respiratory infection)  Assessment & Plan:  History of URI 8 weeks ago  Will get COVID IgG as requested  Discussed limitations of  the test       Hyperlipidemia, unspecified hyperlipidemia type  Assessment & Plan:  Fair control  She has been taking the rosuvastatin 5 mg daily  Get labs when you can      Essential hypertension  Assessment & Plan:  BP is stable  She will cont HCTZ 12.5 mg daily      Cervical myelopathy (CMS/HCC)  Assessment & Plan:  Persisting symptoms  She will continue gabapentin 600 mg three times daily  Follow up with neurology      Vitamin D deficiency  Assessment & Plan:  Fair control  Cont vitamin D3,1000 units daily        Time Spent in Medical Discussion During This Encounter:      15 minutes

## 2020-05-18 RX ORDER — PANTOPRAZOLE SODIUM 40 MG/1
TABLET, DELAYED RELEASE ORAL
Qty: 90 TABLET | Refills: 0 | Status: SHIPPED | OUTPATIENT
Start: 2020-05-18 | End: 2020-07-13

## 2020-06-02 RX ORDER — POTASSIUM CHLORIDE 750 MG/1
TABLET, EXTENDED RELEASE ORAL
Qty: 90 TABLET | Refills: 1 | Status: SHIPPED | OUTPATIENT
Start: 2020-06-02 | End: 2020-10-14

## 2020-06-22 ENCOUNTER — TELEMEDICINE (OUTPATIENT)
Dept: INTERNAL MEDICINE | Facility: CLINIC | Age: 55
End: 2020-06-22
Payer: MEDICARE

## 2020-06-22 DIAGNOSIS — M79.672 LEFT FOOT PAIN: ICD-10-CM

## 2020-06-22 DIAGNOSIS — R73.9 HYPERGLYCEMIA: ICD-10-CM

## 2020-06-22 DIAGNOSIS — E55.9 VITAMIN D DEFICIENCY: ICD-10-CM

## 2020-06-22 DIAGNOSIS — E78.5 HYPERLIPIDEMIA, UNSPECIFIED HYPERLIPIDEMIA TYPE: ICD-10-CM

## 2020-06-22 DIAGNOSIS — I10 ESSENTIAL HYPERTENSION: ICD-10-CM

## 2020-06-22 DIAGNOSIS — Z87.09 HISTORY OF URI (UPPER RESPIRATORY INFECTION): ICD-10-CM

## 2020-06-22 DIAGNOSIS — M25.561 PAIN IN BOTH KNEES, UNSPECIFIED CHRONICITY: ICD-10-CM

## 2020-06-22 DIAGNOSIS — M25.562 PAIN IN BOTH KNEES, UNSPECIFIED CHRONICITY: ICD-10-CM

## 2020-06-22 PROCEDURE — 99442 PR PHYS/QHP TELEPHONE EVALUATION 11-20 MIN: CPT | Mod: 95 | Performed by: INTERNAL MEDICINE

## 2020-06-22 ASSESSMENT — ENCOUNTER SYMPTOMS
HEADACHES: 0
CHILLS: 0
CONFUSION: 0
DYSURIA: 0
COUGH: 0
DIZZINESS: 0
ABDOMINAL PAIN: 0
SHORTNESS OF BREATH: 0
HEMATURIA: 0
DIARRHEA: 0
PALPITATIONS: 0
SORE THROAT: 0
FEVER: 0
NAUSEA: 0
VOMITING: 0
EYE PAIN: 0
BLOOD IN STOOL: 0
FATIGUE: 0
BRUISES/BLEEDS EASILY: 0
BACK PAIN: 0
NUMBNESS: 0

## 2020-06-22 NOTE — PROGRESS NOTES
Verification of Patient Location:  The patient affirms they are currently located in the following state: [place]    Request for Consent:   Video Encounter   Hello, my name is Maya Tesfaye MD.  Before we proceed, can you please verify your identification by telling me your full name and date of birth?  Can you tell me who is in the room with you?    You and I are about to have a telemedicine check-in or visit because you have requested it.  This is a live video-conference.  I am a real person, speaking to you in real time.  There is no one else with me on the video-conference.  However, when we use ([employer], [email or web address], etc) it is important for you to know that the video-conference may not be secure or private.  I am not recording this conversation and I am asking you not to record it.  This telemedicine visit will be billed to your health insurance or you, if you are self-insured.  You understand you will be responsible for any copayments or coinsurances that apply to your telemedicine visit.  Before starting our telemedicine visit, I am required to get your consent for this virtual check-in or visit by telemedicine. Do you consent?    Patient Response to Request for Consent:  Yes      Visit Documentation:  Subjective     Patient ID: Rajwinder Wynne is a 54 y.o. female.  1965      HPI     Spoke with the  patient regarding chronic issues and also ongoing issues  She has had left foot pain mostly at the top of the foot and will be getting an US   Also has had right medial and left lateral knee pain for the last few weeks.no falls  Has gained weight while she has been at home during the COVID pandemic  Wants to get blood work and also COVID antibody testing    The following have been reviewed and updated as appropriate in this visit:  Tobacco  Allergies  Meds  Problems  Med Hx  Surg Hx  Fam Hx  Soc Hx        Review of Systems   Constitutional: Negative for chills, fatigue and fever.   HENT:  Negative for ear pain and sore throat.    Eyes: Negative for pain and visual disturbance.   Respiratory: Negative for cough and shortness of breath.    Cardiovascular: Negative for chest pain, palpitations and leg swelling.   Gastrointestinal: Negative for abdominal pain, blood in stool, diarrhea, nausea and vomiting.   Genitourinary: Negative for dysuria and hematuria.   Musculoskeletal: Negative for back pain.        Knee pain   Foot pain   Skin: Negative for rash.   Allergic/Immunologic: Negative for environmental allergies.   Neurological: Negative for dizziness, numbness and headaches.   Hematological: Does not bruise/bleed easily.   Psychiatric/Behavioral: Negative for confusion.         Assessment/Plan   Diagnoses and all orders for this visit:    Pain in both knees, unspecified chronicity  Assessment & Plan:  likely knee sprain on underlying osteoarthritis   She was avdsied to use heat packs twice daily and stretch   She will be seeing dr david rubinstein for evaluation    Orders:  -     C-reactive protein; Future    Left foot pain  Assessment & Plan:  She is scheduled to get US of the foot to rule out ganglion cyst  She consulted physiatrist and was advsied the above      Essential hypertension  Assessment & Plan:  BP is stable  She will cont HCTZ 12.5 mg daily      Hyperlipidemia, unspecified hyperlipidemia type  Assessment & Plan:  Fair control  She has been taking the rosuvastatin 5 mg daily  Labs ordered    Orders:  -     CBC and differential; Future  -     Comprehensive metabolic panel; Future  -     Lipid panel; Future  -     TSH; Future    Hyperglycemia  Assessment & Plan:  Check fasting blood sugar and A1C    Orders:  -     Hemoglobin A1c; Future    History of URI (upper respiratory infection)  Assessment & Plan:  History of URI   Will get COVID IgG as requested  Discussed limitations of  the test     Orders:  -     Covid-19 SARS CoV-2 Antibody (IgG); Future    Vitamin D deficiency  Assessment &  Plan:  Fair control  Cont vitamin D3,1000 units daily  Check labs    Orders:  -     Vitamin D 25 hydroxy; Future      Time Spent in Medical Discussion During This Encounter:      19 minutes

## 2020-06-22 NOTE — ASSESSMENT & PLAN NOTE
She is scheduled to get US of the foot to rule out ganglion cyst  She consulted physiatrist and was advsied the above

## 2020-06-22 NOTE — ASSESSMENT & PLAN NOTE
likely knee sprain on underlying osteoarthritis   She was avdsied to use heat packs twice daily and stretch   She will be seeing dr david rubinstein for evaluation

## 2020-06-25 RX ORDER — FLUTICASONE PROPIONATE 50 MCG
SPRAY, SUSPENSION (ML) NASAL
Qty: 1 BOTTLE | Refills: 3 | Status: SHIPPED | OUTPATIENT
Start: 2020-06-25 | End: 2021-01-13

## 2020-06-26 ENCOUNTER — APPOINTMENT (OUTPATIENT)
Dept: LAB | Facility: CLINIC | Age: 55
End: 2020-06-26
Attending: INTERNAL MEDICINE
Payer: MEDICARE

## 2020-06-26 DIAGNOSIS — M25.561 PAIN IN BOTH KNEES, UNSPECIFIED CHRONICITY: ICD-10-CM

## 2020-06-26 DIAGNOSIS — E78.5 HYPERLIPIDEMIA, UNSPECIFIED HYPERLIPIDEMIA TYPE: ICD-10-CM

## 2020-06-26 DIAGNOSIS — E55.9 VITAMIN D DEFICIENCY: ICD-10-CM

## 2020-06-26 DIAGNOSIS — R73.9 HYPERGLYCEMIA: ICD-10-CM

## 2020-06-26 DIAGNOSIS — Z87.09 HISTORY OF URI (UPPER RESPIRATORY INFECTION): ICD-10-CM

## 2020-06-26 DIAGNOSIS — M25.562 PAIN IN BOTH KNEES, UNSPECIFIED CHRONICITY: ICD-10-CM

## 2020-06-26 LAB
ALBUMIN SERPL-MCNC: 4.3 G/DL (ref 3.4–5)
ALP SERPL-CCNC: 71 IU/L (ref 35–126)
ALT SERPL-CCNC: 18 IU/L (ref 11–54)
ANION GAP SERPL CALC-SCNC: 10 MEQ/L (ref 3–15)
AST SERPL-CCNC: 24 IU/L (ref 15–41)
BASOPHILS # BLD: 0.07 K/UL (ref 0.01–0.1)
BASOPHILS NFR BLD: 1 %
BILIRUB SERPL-MCNC: 0.9 MG/DL (ref 0.3–1.2)
BUN SERPL-MCNC: 12 MG/DL (ref 8–20)
CALCIUM SERPL-MCNC: 10.1 MG/DL (ref 8.9–10.3)
CHLORIDE SERPL-SCNC: 102 MEQ/L (ref 98–109)
CHOLEST SERPL-MCNC: 175 MG/DL
CO2 SERPL-SCNC: 29 MEQ/L (ref 22–32)
CREAT SERPL-MCNC: 0.7 MG/DL (ref 0.6–1.1)
CRP SERPL-MCNC: <=6 MG/L
DIFFERENTIAL METHOD BLD: ABNORMAL
EOSINOPHIL # BLD: 0.53 K/UL (ref 0.04–0.36)
EOSINOPHIL NFR BLD: 7.7 %
ERYTHROCYTE [DISTWIDTH] IN BLOOD BY AUTOMATED COUNT: 12.9 % (ref 11.7–14.4)
EST. AVERAGE GLUCOSE BLD GHB EST-MCNC: 103 MG/DL
GFR SERPL CREATININE-BSD FRML MDRD: >60 ML/MIN/1.73M*2
GLUCOSE SERPL-MCNC: 101 MG/DL (ref 70–99)
HBA1C MFR BLD HPLC: 5.2 %
HCT VFR BLDCO AUTO: 42.5 % (ref 35–45)
HDLC SERPL-MCNC: 73 MG/DL
HDLC SERPL: 2.4 {RATIO}
HGB BLD-MCNC: 13.8 G/DL (ref 11.8–15.7)
IMM GRANULOCYTES # BLD AUTO: 0.01 K/UL (ref 0–0.08)
IMM GRANULOCYTES NFR BLD AUTO: 0.1 %
LDLC SERPL CALC-MCNC: 86 MG/DL
LYMPHOCYTES # BLD: 3.02 K/UL (ref 1.2–3.5)
LYMPHOCYTES NFR BLD: 44.2 %
MCH RBC QN AUTO: 33.3 PG (ref 28–33.2)
MCHC RBC AUTO-ENTMCNC: 32.5 G/DL (ref 32.2–35.5)
MCV RBC AUTO: 102.4 FL (ref 83–98)
MONOCYTES # BLD: 0.52 K/UL (ref 0.28–0.8)
MONOCYTES NFR BLD: 7.6 %
NEUTROPHILS # BLD: 2.69 K/UL (ref 1.7–7)
NEUTS SEG NFR BLD: 39.4 %
NONHDLC SERPL-MCNC: 102 MG/DL
NRBC BLD-RTO: 0 %
PDW BLD AUTO: 12.8 FL (ref 9.4–12.3)
PLATELET # BLD AUTO: 347 K/UL (ref 150–369)
POTASSIUM SERPL-SCNC: 4.1 MEQ/L (ref 3.6–5.1)
PROT SERPL-MCNC: 6.6 G/DL (ref 6–8.2)
RBC # BLD AUTO: 4.15 M/UL (ref 3.93–5.22)
SARS-COV-2 IGG SERPLBLD QL IA.RAPID: NEGATIVE
SODIUM SERPL-SCNC: 141 MEQ/L (ref 136–144)
TRIGL SERPL-MCNC: 80 MG/DL (ref 30–149)
TSH SERPL DL<=0.05 MIU/L-ACNC: 3.79 MIU/L (ref 0.34–5.6)
WBC # BLD AUTO: 6.84 K/UL (ref 3.8–10.5)

## 2020-06-26 PROCEDURE — 86140 C-REACTIVE PROTEIN: CPT

## 2020-06-26 PROCEDURE — 86769 SARS-COV-2 COVID-19 ANTIBODY: CPT

## 2020-06-26 PROCEDURE — 84443 ASSAY THYROID STIM HORMONE: CPT

## 2020-06-26 PROCEDURE — 80061 LIPID PANEL: CPT

## 2020-06-26 PROCEDURE — 36415 COLL VENOUS BLD VENIPUNCTURE: CPT

## 2020-06-26 PROCEDURE — 83036 HEMOGLOBIN GLYCOSYLATED A1C: CPT

## 2020-06-26 PROCEDURE — 80053 COMPREHEN METABOLIC PANEL: CPT

## 2020-06-26 PROCEDURE — 82306 VITAMIN D 25 HYDROXY: CPT

## 2020-06-26 PROCEDURE — 85025 COMPLETE CBC W/AUTO DIFF WBC: CPT

## 2020-06-27 LAB — 25(OH)D3 SERPL-MCNC: 50 NG/ML (ref 30–100)

## 2020-06-29 ENCOUNTER — TELEPHONE (OUTPATIENT)
Dept: INTERNAL MEDICINE | Facility: CLINIC | Age: 55
End: 2020-06-29

## 2020-06-29 NOTE — TELEPHONE ENCOUNTER
Spoke with the patient and reviewed the lab results  She also told me that she was running with her dog and missed her step and fell on the side of her  right arm and she has issues with the biceps muscle   she has been using the ice packs and taking the meloxicam I advised her to continue the above and follow-up if not better

## 2020-07-03 RX ORDER — HYDROCHLOROTHIAZIDE 25 MG/1
TABLET ORAL
Qty: 45 TABLET | Refills: 1 | Status: SHIPPED | OUTPATIENT
Start: 2020-07-03 | End: 2021-04-08

## 2020-08-31 RX ORDER — ROSUVASTATIN CALCIUM 5 MG/1
5 TABLET, COATED ORAL
Qty: 90 TABLET | Refills: 1 | Status: SHIPPED | OUTPATIENT
Start: 2020-08-31 | End: 2021-02-10

## 2020-09-21 ENCOUNTER — TRANSCRIBE ORDERS (OUTPATIENT)
Dept: SCHEDULING | Age: 55
End: 2020-09-21

## 2020-09-21 DIAGNOSIS — M25.511 PAIN IN RIGHT SHOULDER: Primary | ICD-10-CM

## 2020-09-24 ENCOUNTER — HOSPITAL ENCOUNTER (OUTPATIENT)
Dept: RADIOLOGY | Facility: CLINIC | Age: 55
Discharge: HOME | End: 2020-09-24
Attending: ORTHOPAEDIC SURGERY
Payer: MEDICARE

## 2020-09-24 DIAGNOSIS — M25.511 PAIN IN RIGHT SHOULDER: ICD-10-CM

## 2020-10-14 RX ORDER — POTASSIUM CHLORIDE 750 MG/1
TABLET, EXTENDED RELEASE ORAL
Qty: 90 TABLET | Refills: 1 | Status: SHIPPED | OUTPATIENT
Start: 2020-10-14 | End: 2021-05-13

## 2020-10-14 NOTE — TELEPHONE ENCOUNTER
Medicine Refill Request    Last Office Visit: 2/24/2020  Last Telemedicine Visit: 6/22/2020 Maya Tesfaye MD    Next Office Visit: Visit date not found  Last filled on: 06/02/2020          Current Outpatient Medications:   •  b complex vitamins tablet, Take 400 mg by mouth daily., Disp: , Rfl:   •  cetirizine (ZyrTEC) 10 mg tablet, Take 1 tablet by mouth daily., Disp: , Rfl:   •  cholecalciferol, vitamin D3, (VITAMIN D3) 1,000 unit capsule, take 1 tablet by oral route  every day, Disp: , Rfl:   •  fluticasone propionate (FLONASE) 50 mcg/actuation nasal spray, SPRAY 2 SPRAYS INTO EACH NOSTRIL EVERY DAY, Disp: 1 Bottle, Rfl: 3  •  folic acid/multivit,iron, (CENTRUM ORAL), Take by mouth., Disp: , Rfl:   •  gabapentin (NEURONTIN) 300 mg capsule, Take 900 mg by mouth 3 (three) times a day.  , Disp: , Rfl:   •  hydrochlorothiazide (HYDRODIURIL) 25 mg tablet, TAKE 0.5 TABLETS (12.5 MG TOTAL) BY MOUTH DAILY., Disp: 45 tablet, Rfl: 1  •  KLOR-CON M10 10 mEq CR tablet, TAKE 1 TABLET BY MOUTH EVERY DAY, Disp: 90 tablet, Rfl: 1  •  meloxicam (MOBIC) 7.5 mg tablet, Take 7.5 mg by mouth daily., Disp: , Rfl:   •  pantoprazole (PROTONIX) 40 mg EC tablet, TAKE 1 TABLET BY MOUTH EVERY DAY, Disp: 90 tablet, Rfl: 1  •  rosuvastatin (CRESTOR) 5 mg tablet, Take 1 tablet (5 mg total) by mouth once daily., Disp: 90 tablet, Rfl: 1  •  valACYclovir (VALTREX) 500 mg tablet, Take 500 mg by mouth every 12 (twelve) hours., Disp: , Rfl:       BP Readings from Last 3 Encounters:   02/24/20 120/74   01/17/20 (!) 148/81   12/17/19 100/64       Recent Lab results:  Lab Results   Component Value Date    CHOL 175 06/26/2020   ,   Lab Results   Component Value Date    HDL 73 06/26/2020   ,   Lab Results   Component Value Date    LDLCALC 86 06/26/2020   ,   Lab Results   Component Value Date    TRIG 80 06/26/2020        Lab Results   Component Value Date    GLUCOSE 101 (H) 06/26/2020   ,   Lab Results   Component Value Date    HGBA1C 5.2  06/26/2020         Lab Results   Component Value Date    CREATININE 0.7 06/26/2020       Lab Results   Component Value Date    TSH 3.79 06/26/2020

## 2020-10-15 ENCOUNTER — CLINICAL SUPPORT (OUTPATIENT)
Dept: OTHER | Facility: CLINIC | Age: 55
End: 2020-10-15

## 2020-10-15 ENCOUNTER — TELEPHONE (OUTPATIENT)
Dept: INFECTIOUS DISEASES | Facility: HOSPITAL | Age: 55
End: 2020-10-15

## 2020-10-15 ENCOUNTER — TELEPHONE (OUTPATIENT)
Dept: INTERNAL MEDICINE | Facility: CLINIC | Age: 55
End: 2020-10-15

## 2020-10-15 DIAGNOSIS — R51.9 NONINTRACTABLE HEADACHE, UNSPECIFIED CHRONICITY PATTERN, UNSPECIFIED HEADACHE TYPE: ICD-10-CM

## 2020-10-15 DIAGNOSIS — R51.9 NONINTRACTABLE HEADACHE, UNSPECIFIED CHRONICITY PATTERN, UNSPECIFIED HEADACHE TYPE: Primary | ICD-10-CM

## 2020-10-15 NOTE — TELEPHONE ENCOUNTER
Pt called, she said she has no fever. She's had a headache and sharp pains and diarrhea in her stomach for about 2 days. She said she cannot stop belching. Her ear hurts but no sore throat, no coughing.No SOB, no loss of smell or taste. She's concerned she needs a covid test. I advised I will call back, Pt ph# [phone]

## 2020-10-15 NOTE — PROGRESS NOTES
Patient was processed today at [facility] drive-up clinic.  Pt denies any sort of medical distress today.  After identifying the patient, a nasopharyngeal sample was obtained and placed in viral transport medium.  Pt was given a post-collection education sheet and encouraged to self-isolate until they receive the results.  Pt directed to [facility] website for [facility] Privacy Practices.  Sample sent to the lab noted on the order and requisition.  Pt was without additional questions or concerns and was dispositioned from the testing area to home.

## 2020-10-15 NOTE — TELEPHONE ENCOUNTER
Spoke with the patient she told me that she has had a headache for the last few days but also developed abdominal discomfort and loose stools she also has achiness and does not feel well she does not have a fever   discussed that we will get her tested for COVID she knows that the command center will call her for the appointment time at the drive-through clinic in [place]  I called the command center and ordered the test   the patient was advised to rest hydrate monitor her temperature and take Tylenol as needed   she will call me with an update in a few days

## 2020-10-15 NOTE — TELEPHONE ENCOUNTER
Please schedule this patient for Covid 19 testing.  I have updated the patient's demographic information with the patient's contact information for scheduling.    Patient symptoms:Nausea/diarrhea and Headache    Provider is: Provider Select: [facility] Provider  (Independent providers with Epic access SHOULD NOT put orders in Epic, results will not route)    Ordering provider (First - Last): PCP  Provider Best Callback # for NSQ: [phone]    This patient is a [employer] Employee: YES/NO/NA: No  If yes: notify Employee Exposure line and advise ordering provider that Employee's also need to call that team.    If an [facility] provider, advised order needs to be in Epic.    If an independent provider, they are directed to fax the order to [phone]  (Fax should include: patient name, date of birth, Covid 19 [facility] order, ICD-10 for symptoms, and either [facility] account number or fax number for [facility] to send results)    Independent provider results will arrive via [facility]   [facility] provider results will route via Epic

## 2020-10-16 ENCOUNTER — TELEPHONE (OUTPATIENT)
Dept: INTERNAL MEDICINE | Facility: CLINIC | Age: 55
End: 2020-10-16

## 2020-10-16 DIAGNOSIS — R10.9 ABDOMINAL PAIN, UNSPECIFIED ABDOMINAL LOCATION: Primary | ICD-10-CM

## 2020-10-16 NOTE — TELEPHONE ENCOUNTER
Spoke with the patient and she told me that she had COVID  test done yesterday and is awaiting the results   her headache is resolved she continues to have belching and thinks that she may be having more GI symptoms suggestive of indigestion she has had frequent and persisting belching every time she eats she told me that she had pasta on Monday night late and she woke up with abdominal discomfort and also belching she also had vomiting the next day of undigested food we discussed that it could be related to gallbladder but also could be gastritis we discussed dietary changes and to drink lots of fluids she will schedule ultrasound of the abdomen and will call her with results

## 2020-10-16 NOTE — TELEPHONE ENCOUNTER
Pt called back, she said she did get covid test done yesterday but she really thinks its actually a gall bladder issue and she'd like to speak to you to try to diagnose further. Please call back, ph# [phone]

## 2020-10-18 LAB — SARS-COV-2 RNA RESP QL NAA+PROBE: NOT DETECTED

## 2020-10-19 ENCOUNTER — HOSPITAL ENCOUNTER (OUTPATIENT)
Dept: RADIOLOGY | Facility: HOSPITAL | Age: 55
Discharge: HOME | End: 2020-10-19
Attending: INTERNAL MEDICINE
Payer: MEDICARE

## 2020-10-19 DIAGNOSIS — R10.9 ABDOMINAL PAIN, UNSPECIFIED ABDOMINAL LOCATION: ICD-10-CM

## 2020-10-19 PROCEDURE — 76700 US EXAM ABDOM COMPLETE: CPT

## 2020-10-22 ENCOUNTER — TELEPHONE (OUTPATIENT)
Dept: INTERNAL MEDICINE | Facility: CLINIC | Age: 55
End: 2020-10-22

## 2020-10-22 NOTE — TELEPHONE ENCOUNTER
Pt called back stating she is a little confused on 1 thing and would just like to quickly discuss

## 2020-10-22 NOTE — TELEPHONE ENCOUNTER
Pt called stating she received the copy of her labs and is very confused asked if she needs a follow up apt or if she is ok

## 2020-10-23 NOTE — TELEPHONE ENCOUNTER
Spoke with the patient and reviewed the ultrasound results in detail she has a bilobed hepatic cyst and bilateral renal cyst that we will continue to monitor she also has a nonobstructing calculus at the lower pole of right kidney we discussed that if she continues to have abdominal discomfort that is suggestive of gallbladder then  she would be advised to see a general surgeon for further evaluation otherwise we will continue to monitor it

## 2020-11-11 ENCOUNTER — TRANSCRIBE ORDERS (OUTPATIENT)
Dept: SCHEDULING | Age: 55
End: 2020-11-11

## 2020-11-12 ENCOUNTER — TRANSCRIBE ORDERS (OUTPATIENT)
Dept: SCHEDULING | Age: 55
End: 2020-11-12

## 2020-11-12 DIAGNOSIS — Q92.2 CHROMOSOME XQ27.3-Q28 DUPLICATION SYNDROME: Primary | ICD-10-CM

## 2020-12-01 ENCOUNTER — TRANSCRIBE ORDERS (OUTPATIENT)
Dept: SCHEDULING | Age: 55
End: 2020-12-01

## 2020-12-01 ENCOUNTER — TELEPHONE (OUTPATIENT)
Dept: SCHEDULING | Facility: CLINIC | Age: 55
End: 2020-12-01

## 2020-12-01 DIAGNOSIS — R13.14 DYSPHAGIA, PHARYNGOESOPHAGEAL PHASE: Primary | ICD-10-CM

## 2020-12-01 NOTE — TELEPHONE ENCOUNTER
Pt calling to make a follow up appt with Dr Gaona.  Last seen him 1/2/2018. First avail is in March. Pt states she is not having any issues but would like to follow up and be checked out.  Pt can be reached at [phone].

## 2020-12-07 NOTE — TELEPHONE ENCOUNTER
Pt called to reschedule.  Pt states she doesn't feel comfortable coming to the office at this time.  Pt rescheduled for February.

## 2020-12-14 ENCOUNTER — HOSPITAL ENCOUNTER (OUTPATIENT)
Dept: RADIOLOGY | Facility: HOSPITAL | Age: 55
Discharge: HOME | End: 2020-12-14
Attending: NURSE PRACTITIONER
Payer: MEDICARE

## 2020-12-14 DIAGNOSIS — Q28.3 CAVERNOUS MALFORMATION: Primary | ICD-10-CM

## 2020-12-14 PROCEDURE — 70551 MRI BRAIN STEM W/O DYE: CPT

## 2020-12-29 ENCOUNTER — TRANSCRIBE ORDERS (OUTPATIENT)
Dept: SCHEDULING | Age: 55
End: 2020-12-29

## 2020-12-29 DIAGNOSIS — M81.0 AGE-RELATED OSTEOPOROSIS WITHOUT CURRENT PATHOLOGICAL FRACTURE: Primary | ICD-10-CM

## 2021-01-13 RX ORDER — FLUTICASONE PROPIONATE 50 MCG
SPRAY, SUSPENSION (ML) NASAL
Qty: 48 ML | Refills: 1 | Status: SHIPPED | OUTPATIENT
Start: 2021-01-13 | End: 2021-10-22 | Stop reason: SDUPTHER

## 2021-01-15 ENCOUNTER — TELEPHONE (OUTPATIENT)
Dept: INTERNAL MEDICINE | Facility: CLINIC | Age: 56
End: 2021-01-15

## 2021-01-15 NOTE — TELEPHONE ENCOUNTER
Spoke with the patient and discussed that she will wait to get the COVID vaccine at the Pending sale to [facility] vs [facility]  She will check for Epipen coverage  since she has allergy to Shellfish  She also asked about her mother and we discussed that's he will also wait to get the cOVID vaccine at the Pending sale to [facility]

## 2021-01-15 NOTE — TELEPHONE ENCOUNTER
Pt called with some questions in regards to the vaccine her and her mother are on a wait list and she is feeling a little un-sure

## 2021-01-25 ENCOUNTER — TRANSCRIBE ORDERS (OUTPATIENT)
Dept: SCHEDULING | Age: 56
End: 2021-01-25

## 2021-01-25 DIAGNOSIS — Z11.59 ENCOUNTER FOR SCREENING FOR OTHER VIRAL DISEASES: Primary | ICD-10-CM

## 2021-01-27 ENCOUNTER — HOSPITAL ENCOUNTER (OUTPATIENT)
Dept: RADIOLOGY | Facility: CLINIC | Age: 56
Discharge: HOME | End: 2021-01-27
Attending: INTERNAL MEDICINE
Payer: MEDICARE

## 2021-01-27 DIAGNOSIS — M81.0 AGE-RELATED OSTEOPOROSIS WITHOUT CURRENT PATHOLOGICAL FRACTURE: ICD-10-CM

## 2021-01-27 PROCEDURE — 77080 DXA BONE DENSITY AXIAL: CPT

## 2021-02-01 ENCOUNTER — APPOINTMENT (OUTPATIENT)
Dept: LAB | Facility: HOSPITAL | Age: 56
End: 2021-02-01
Attending: INTERNAL MEDICINE
Payer: MEDICARE

## 2021-02-01 DIAGNOSIS — Z11.59 ENCOUNTER FOR SCREENING FOR OTHER VIRAL DISEASES: ICD-10-CM

## 2021-02-01 PROCEDURE — C9803 HOPD COVID-19 SPEC COLLECT: HCPCS

## 2021-02-01 PROCEDURE — U0003 INFECTIOUS AGENT DETECTION BY NUCLEIC ACID (DNA OR RNA); SEVERE ACUTE RESPIRATORY SYNDROME CORONAVIRUS 2 (SARS-COV-2) (CORONAVIRUS DISEASE [COVID-19]), AMPLIFIED PROBE TECHNIQUE, MAKING USE OF HIGH THROUGHPUT TECHNOLOGIES AS DESCRIBED BY CMS-2020-01-R: HCPCS

## 2021-02-03 LAB — SARS-COV-2 RNA RESP QL NAA+PROBE: NOT DETECTED

## 2021-02-05 ENCOUNTER — ANESTHESIA (OUTPATIENT)
Dept: ENDOSCOPY | Facility: HOSPITAL | Age: 56
End: 2021-02-05
Payer: MEDICARE

## 2021-02-05 ENCOUNTER — HOSPITAL ENCOUNTER (OUTPATIENT)
Facility: HOSPITAL | Age: 56
Discharge: HOME | End: 2021-02-05
Attending: INTERNAL MEDICINE | Admitting: INTERNAL MEDICINE
Payer: MEDICARE

## 2021-02-05 ENCOUNTER — ANESTHESIA EVENT (OUTPATIENT)
Dept: ENDOSCOPY | Facility: HOSPITAL | Age: 56
End: 2021-02-05
Payer: MEDICARE

## 2021-02-05 VITALS
HEIGHT: 58 IN | SYSTOLIC BLOOD PRESSURE: 119 MMHG | HEART RATE: 80 BPM | RESPIRATION RATE: 17 BRPM | BODY MASS INDEX: 23.51 KG/M2 | TEMPERATURE: 97.4 F | OXYGEN SATURATION: 97 % | WEIGHT: 112 LBS | DIASTOLIC BLOOD PRESSURE: 84 MMHG

## 2021-02-05 DIAGNOSIS — Z80.0 FAMILY HISTORY OF COLON CANCER: ICD-10-CM

## 2021-02-05 PROCEDURE — 71000011 HC PACU PHASE 1 EA ADDL MIN: Performed by: INTERNAL MEDICINE

## 2021-02-05 PROCEDURE — 71000001 HC PACU PHASE 1 INITIAL 30MIN: Performed by: INTERNAL MEDICINE

## 2021-02-05 PROCEDURE — 0DBM8ZX EXCISION OF DESCENDING COLON, VIA NATURAL OR ARTIFICIAL OPENING ENDOSCOPIC, DIAGNOSTIC: ICD-10-PCS | Performed by: INTERNAL MEDICINE

## 2021-02-05 PROCEDURE — 25800000 HC PHARMACY IV SOLUTIONS: Performed by: NURSE ANESTHETIST, CERTIFIED REGISTERED

## 2021-02-05 PROCEDURE — 75000081 HC EGD W WO BRUSH WASH: Performed by: INTERNAL MEDICINE

## 2021-02-05 PROCEDURE — 88305 TISSUE EXAM BY PATHOLOGIST: CPT | Performed by: INTERNAL MEDICINE

## 2021-02-05 PROCEDURE — 37000002 HC ANESTHESIA MAC: Performed by: INTERNAL MEDICINE

## 2021-02-05 PROCEDURE — 75000020 HC COLONSCOPY SNARE: Performed by: INTERNAL MEDICINE

## 2021-02-05 PROCEDURE — 63600000 HC DRUGS/DETAIL CODE: Performed by: NURSE ANESTHETIST, CERTIFIED REGISTERED

## 2021-02-05 PROCEDURE — 0DJ08ZZ INSPECTION OF UPPER INTESTINAL TRACT, VIA NATURAL OR ARTIFICIAL OPENING ENDOSCOPIC: ICD-10-PCS | Performed by: INTERNAL MEDICINE

## 2021-02-05 PROCEDURE — 25000000 HC PHARMACY GENERAL: Performed by: NURSE ANESTHETIST, CERTIFIED REGISTERED

## 2021-02-05 RX ORDER — IBUPROFEN 200 MG
16-32 TABLET ORAL AS NEEDED
Status: DISCONTINUED | OUTPATIENT
Start: 2021-02-05 | End: 2021-02-05 | Stop reason: HOSPADM

## 2021-02-05 RX ORDER — DEXTROSE 50 % IN WATER (D50W) INTRAVENOUS SYRINGE
25 AS NEEDED
Status: DISCONTINUED | OUTPATIENT
Start: 2021-02-05 | End: 2021-02-05 | Stop reason: HOSPADM

## 2021-02-05 RX ORDER — GLYCOPYRROLATE 0.6MG/3ML
SYRINGE (ML) INTRAVENOUS AS NEEDED
Status: DISCONTINUED | OUTPATIENT
Start: 2021-02-05 | End: 2021-02-05 | Stop reason: SURG

## 2021-02-05 RX ORDER — SODIUM CHLORIDE 9 MG/ML
INJECTION, SOLUTION INTRAVENOUS CONTINUOUS PRN
Status: DISCONTINUED | OUTPATIENT
Start: 2021-02-05 | End: 2021-02-05 | Stop reason: SURG

## 2021-02-05 RX ORDER — ONDANSETRON HYDROCHLORIDE 2 MG/ML
INJECTION, SOLUTION INTRAVENOUS AS NEEDED
Status: DISCONTINUED | OUTPATIENT
Start: 2021-02-05 | End: 2021-02-05 | Stop reason: SURG

## 2021-02-05 RX ORDER — PROPOFOL 200MG/20ML
SYRINGE (ML) INTRAVENOUS AS NEEDED
Status: DISCONTINUED | OUTPATIENT
Start: 2021-02-05 | End: 2021-02-05 | Stop reason: SURG

## 2021-02-05 RX ORDER — PHENYLEPHRINE HCL IN 0.9% NACL 1 MG/10 ML
SYRINGE (ML) INTRAVENOUS AS NEEDED
Status: DISCONTINUED | OUTPATIENT
Start: 2021-02-05 | End: 2021-02-05 | Stop reason: SURG

## 2021-02-05 RX ORDER — DEXTROSE 40 %
15-30 GEL (GRAM) ORAL AS NEEDED
Status: DISCONTINUED | OUTPATIENT
Start: 2021-02-05 | End: 2021-02-05 | Stop reason: HOSPADM

## 2021-02-05 RX ORDER — PROPOFOL 10 MG/ML
INJECTION, EMULSION INTRAVENOUS CONTINUOUS PRN
Status: DISCONTINUED | OUTPATIENT
Start: 2021-02-05 | End: 2021-02-05 | Stop reason: SURG

## 2021-02-05 RX ADMIN — GLYCOPYRROLATE 0.1 MG: 0.2 INJECTION, SOLUTION INTRAMUSCULAR; INTRAVITREAL at 09:45

## 2021-02-05 RX ADMIN — Medication 100 MCG: at 09:58

## 2021-02-05 RX ADMIN — PROPOFOL 20 MG: 10 INJECTION, EMULSION INTRAVENOUS at 09:45

## 2021-02-05 RX ADMIN — Medication 100 MCG: at 10:15

## 2021-02-05 RX ADMIN — ONDANSETRON HYDROCHLORIDE 4 MG: 2 SOLUTION INTRAMUSCULAR; INTRAVENOUS at 10:35

## 2021-02-05 RX ADMIN — PROPOFOL 30 MG: 10 INJECTION, EMULSION INTRAVENOUS at 09:48

## 2021-02-05 RX ADMIN — PROPOFOL INJECTABLE EMULSION 150 MCG/KG/MIN: 10 INJECTION, EMULSION INTRAVENOUS at 09:46

## 2021-02-05 RX ADMIN — PROPOFOL 20 MG: 10 INJECTION, EMULSION INTRAVENOUS at 10:04

## 2021-02-05 RX ADMIN — PROPOFOL 30 MG: 10 INJECTION, EMULSION INTRAVENOUS at 09:59

## 2021-02-05 RX ADMIN — PROPOFOL 20 MG: 10 INJECTION, EMULSION INTRAVENOUS at 10:07

## 2021-02-05 RX ADMIN — SODIUM CHLORIDE: 9 INJECTION, SOLUTION INTRAVENOUS at 09:42

## 2021-02-05 ASSESSMENT — ENCOUNTER SYMPTOMS: HEADACHES: 1

## 2021-02-05 NOTE — ANESTHESIOLOGIST PRE-PROCEDURE ATTESTATION
Pre-Procedure Patient Identification:  I am the Primary Anesthesiologist and have identified the patient on 02/05/21 at 9:12 AM.   I have confirmed the following procedure(s) Colonoscopy will be performed by the following surgeon/proceduralist Joe Ordaz MD.

## 2021-02-05 NOTE — DISCHARGE INSTRUCTIONS
Dear Ms. Sherrell ,       You came to [facility] today for an Endoscopy and Colonoscopy today. Your Endoscopy was normal. During your colonoscopy one small polyp was removed. Your colonoscopy was otherwise normal. Please call our office at [phone] in 1 week to discuss pathology results. Your next colonoscopy is due in 5 years.    Sincerely,    Dr. Fran Pollard    Colonoscopy, Adult, Care After  This sheet gives you information about how to care for yourself after your procedure. Your doctor may also give you more specific instructions. If you have problems or questions, call your doctor.    What can I expect after the procedure?  After the procedure, it is common to have:  · A small amount of blood in your poop for 24 hours.  · Some gas.  · Mild cramping or bloating in your belly.    Follow these instructions at home:    General instructions  · For the first 24 hours after the procedure:  ? Do not drive or use machinery.  ? Do not sign important documents.  ? Do not drink alcohol.  ? Do your daily activities more slowly than normal.  ? Eat foods that are soft and easy to digest.  · Take over-the-counter or prescription medicines only as told by your doctor.    To help cramping and bloating:  · Try walking around.  · Put heat on your belly (abdomen) as told by your doctor. Use a heat source that your doctor recommends, such as a moist heat pack or a heating pad.  ? Put a towel between your skin and the heat source.  ? Leave the heat on for 20-30 minutes.  ? Remove the heat if your skin turns bright red. This is especially important if you cannot feel pain, heat, or cold. You can get burned.    Eating and drinking  · Drink enough fluid to keep your pee (urine) clear or pale yellow.  · Return to your normal diet as told by your doctor. Avoid heavy or fried foods that are hard to digest.  · Avoid drinking alcohol for as long as told by your doctor.    Contact a doctor if:  · You have blood in your poop  (stool) 2-3 days after the procedure.    Get help right away if:  · You have more than a small amount of blood in your poop.  · You see large clumps of tissue (blood clots) in your poop.  · Your belly is swollen.  · You feel sick to your stomach (nauseous).  · You throw up (vomit).  · You have a fever.  · You have belly pain that gets worse, and medicine does not help your pain.    Summary  · After the procedure, it is common to have a small amount of blood in your poop. You may also have mild cramping and bloating in your belly.  · For the first 24 hours after the procedure, do not drive or use machinery, do not sign important documents, and do not drink alcohol.  · Get help right away if you have a lot of blood in your poop, feel sick to your stomach, have a fever, or have more belly pain.    This information is not intended to replace advice given to you by your health care provider. Make sure you discuss any questions you have with your health care provider.  Document Released: 01/20/2012 Document Revised: 10/18/2018 Document Reviewed: 09/11/2017  [employer] Interactive Patient Education © 2019 [employer]

## 2021-02-05 NOTE — OP NOTE
_______________________________________________________________________________  Patient Name: Rajwinder Wynne         Procedure Date: 2/5/2021 9:27 AM  MRN: [id number]                     Account Number: [id number]  YOB: 1965             Age: 55  Gender: Female                        Note Status: Finalized  Attending MD: PREMA CUEVAS MD~MASON  _______________________________________________________________________________  Procedure:           Colonoscopy  Indications:         Colon cancer screening in patient at increased risk:  Family history of colorectal cancer in multiple 2nd  degree relatives  Providers:           PREMA CUEVAS MD~MASON (Doctor), Fran Pollard (Fellow)  Referring MD:        KRIS GOODEN MD  Requesting Provider:  Complications:       No immediate complications.  _______________________________________________________________________________  Procedure:           After I obtained informed consent, the scope was passed  under direct vision. Throughout the procedure, the  patient's blood pressure, pulse, and oxygen saturations  were monitored continuously. The colonoscope was  introduced through the anus and advanced to the cecum,  identified by appendiceal orifice and ileocecal valve.  The colonoscopy was performed without difficulty. The  patient tolerated the procedure well. The quality of the  bowel preparation was good.  Findings:  The perianal and digital rectal examinations were normal.  A 6 mm polyp was found in the descending colon. The polyp was sessile.  The polyp was removed with a cold snare. Resection and retrieval were  complete.  The retroflexed view of the distal rectum and anal verge was normal and  showed no anal or rectal abnormalities.  The exam was otherwise normal throughout the examined colon.  Impression:          - One 6 mm polyp in the descending colon, removed with a  cold snare. Resected and retrieved.  - Otherwise  normal colonoscopy.  Recommendation:      - Discharge patient to home (with escort).  - Resume regular diet today.  - Await pathology results.  - Repeat colonoscopy in 5 years for surveillance.  Procedure Code(s):   --- Professional ---  57893, Colonoscopy, flexible; with removal of tumor(s),  polyp(s), or other lesion(s) by snare technique  Diagnosis Code(s):   --- Professional ---  Z80.0, Family history of malignant neoplasm of digestive  organs  D12.4, Benign neoplasm of descending colon  CPT copyright 2018 American Medical Association. All rights reserved.  The codes documented in this report are preliminary and upon  review may  be revised to meet current compliance requirements.  Attending Participation:  I was present and participated during the entire procedure, including  non-key portions.  _____________________________________  PREMA CUEVAS MD~MASON  2/5/2021 1:09:21 PM  This report has been signed electronically.  Number of Addenda: 0  Note Initiated On: 2/5/2021 9:27 AM

## 2021-02-05 NOTE — ANESTHESIA PREPROCEDURE EVALUATION
"Relevant Problems   CARDIOVASCULAR   (+) Essential hypertension      NEUROLOGY   (+) History of URI (upper respiratory infection)     55 y.o. female who is here due to family history of colon cancer and dysphagia and PMH cervical radiculopathy, GERD, and cerebral cavernoma. Also h/o C4-7 fusion and resultant esophageal stenosis? And severe vertigo. Here for EGD and colonoscopy.    Anesthesia ROS/MED HX      Neuro/Psych    Headaches  Cardiovascular   Covid19 Test Reviewed and ECG reviewed  Comments: ROM limited by dizziness. Patient reports severe vertigo especially when lying flat, or to the right. Neck extension causes numbness and tingling in extremities and shooting down \"spine.\"  Endo/Other  Body Habitus: Normal       Past Surgical History:   Procedure Laterality Date   • ANTERIOR FUSION CERVICAL SPINE      c6-7   • BUNIONECTOMY     • CRICOPHARYNGEAL MYOTOMY     • KNEE SURGERY     • LUMBAR FUSION      L5-S1   • MYOMECTOMY     • THROAT SURGERY     • TIBIAL SESAMOID EXCISION         Physical Exam    Airway   Mallampati: II   TM distance: >3 FB   Neck ROM: limited  Cardiovascular - normal   Rhythm: regular   Rate: normalPulmonary - normal   clear to auscultation  Other Findings   ROM limited by dizziness. Patient reports severe vertigo especially when lying flat, or to the right. Neck extension causes numbness and tingling in extremities and shooting down \"spine.\"  Dental - normal        Anesthesia Plan    Plan: MAC    Technique: MAC     Lines and Monitors: PIV     Airway: natural airway / supplemental oxygen   ASA 3  Blood Products:     Use of Blood Products Discussed: Yes     Consented to blood products  Anesthetic plan and risks discussed with: patient  Postop Plan:   Patient Disposition: inpatient floor planned admission   Pain Management: IV analgesics    "

## 2021-02-05 NOTE — ANESTHESIA POSTPROCEDURE EVALUATION
Patient: Rajwinder Wynne    Procedure Summary     Date: 02/05/21 Room / Location: [facility] 4 (D) / [facility]    Anesthesia Start: 0942 Anesthesia Stop: 1037    Procedures:       FLEXIBLE COLONOSCOPY PROXIMAL TO SPLENIC FLEXURE WITH REMOVAL OF TUMOR USING SNARE (N/A Anus)      DIAGNOSTIC UPPER GASTROINTESTINAL ENDOSCOPY WITH COLLECTION OF SPECIMEN BY WASHING (N/A Esophagus) Diagnosis:       Family history of colon cancer      (Family History of Colon Ca)    Provider: Joe Ordaz MD Responsible Provider: Kait Leslie DO    Anesthesia Type: MAC ASA Status: 3          Anesthesia Type: MAC  PACU Vitals  2/5/2021 1033 - 2/5/2021 1042      2/5/2021  1037             BP:  (!) 85/54    Temp:  36.3 °C (97.4 °F)    Pulse:  85    Resp:  16    SpO2:  100 %            Anesthesia Post Evaluation    Pain management: adequate  Patient location during evaluation: PACU  Patient participation: complete - patient participated  Level of consciousness: awake and alert  Cardiovascular status: acceptable  Airway Patency: adequate  Respiratory status: acceptable  Hydration status: acceptable  Anesthetic complications: no

## 2021-02-05 NOTE — OP NOTE
_______________________________________________________________________________  Patient Name: Rajwinder Wynne         Procedure Date: 2/5/2021 9:24 AM  MRN: [id number]                     Account Number: [id number]  YOB: 1965             Age: 55  Gender: Female                        Note Status: Finalized  Attending MD: PREMA CUEVAS MD~MASON  _______________________________________________________________________________  Procedure:           Upper GI endoscopy  Indications:         Dysphagia  Providers:           PREMA CUEVAS MD~MASON (Doctor), Fran Pollard (Fellow)  Referring MD:        KRIS GOODEN MD  Requesting Provider:  Medicines:           See the Anesthesia note for documentation of the  administered medications  Complications:       No immediate complications.  _______________________________________________________________________________  Procedure:           After obtaining informed consent, the endoscope was  passed under direct vision. Throughout the procedure,  the patient's blood pressure, pulse, and oxygen  saturations were monitored continuously. The endoscope  was introduced through the mouth, and advanced to the  second part of duodenum. The upper GI endoscopy was  accomplished without difficulty. The patient tolerated  the procedure well.  Findings:  The examined esophagus was normal. Peristalsis was present. There was no  mass, stricture or Schatzki's ring appreciated. The GE junction was  easily traversable.  The Z-line was regular and was found 36 cm from the incisors.  A few 2 to 5 mm sessile polyps with no bleeding and no stigmata of  recent bleeding were found in the gastric fundus and in the gastric body.  The cardia and gastric fundus were normal on retroflexion.  The exam of the stomach was otherwise normal.  The duodenal bulb, first portion of the duodenum and second portion of  the duodenum were normal.  Impression:          -  Normal esophagus. No evidence of mass, stricture or  Schatzki's ring. GE junction easily traversable.  - Z-line regular, 36 cm from the incisors.  - A few benign appearing gastric polyps.  - Normal duodenal bulb, first portion of the duodenum  and second portion of the duodenum.  - No specimens collected.  Recommendation:      - Perform a colonoscopy today.  - Follow up in GI office regarding further work up of  dysphagia.  Procedure Code(s):   --- Professional ---  01935, Esophagogastroduodenoscopy, flexible, transoral;  diagnostic, including collection of specimen(s) by  brushing or washing, when performed (separate procedure)  Diagnosis Code(s):   --- Professional ---  K31.7, Polyp of stomach and duodenum  R13.10, Dysphagia, unspecified  CPT copyright 2018 American Medical Association. All rights reserved.  The codes documented in this report are preliminary and upon  review may  be revised to meet current compliance requirements.  Attending Participation:  I was present and participated during the entire procedure, including  non-key portions.  _____________________________________  PREMA CUEVAS MD~MASON  2/5/2021 1:09:55 PM  This report has been signed electronically.  Number of Addenda: 0  Note Initiated On: 2/5/2021 9:24 AM

## 2021-02-05 NOTE — H&P
Gastroenterology  History and Physical  Outpatient Procedure/s       HISTORY OF PRESENT ILLNESS   Luis Eduardo Wynne is a 55 y.o. female who is here due to family history of colon cancer and dysphagia.    Tolerated bowel prep.      PAST MEDICAL AND SURGICAL HISTORY     Past Medical History:   Diagnosis Date   • Allergic rhinitis    • Cerebral cavernoma     parietal cavernoma   • Cervical myelopathy (CMS/HCC)    • Cervical radiculopathy    • Cervicogenic headache    • Closed fracture of sesamoid bone of foot    • Cricopharyngeal dysphagia    • Fibroids    • Food allergy    • GERD (gastroesophageal reflux disease)    • Lipid disorder    • Nephrolithiasis    • Osteoporosis      Past Surgical History:   Procedure Laterality Date   • ANTERIOR FUSION CERVICAL SPINE      c6-7   • BUNIONECTOMY     • CRICOPHARYNGEAL MYOTOMY     • KNEE SURGERY     • LUMBAR FUSION      L5-S1   • MYOMECTOMY     • THROAT SURGERY     • TIBIAL SESAMOID EXCISION         ALLERGIES     Allergies   Allergen Reactions   • Liriano Flavor Other (see comments)   • Shellfish Containing Products Anaphylaxis     Pumpkin seeds   • Adhesive      Steri strips   • [place] Other (see comments)   • Banana Other (see comments)     also allergic to almonds, berries   • Erythromycin    • Erythromycin Base Other (see comments)     GI Upset  Other reaction(s): GI side effects   • Iodine Other (see comments)     shell fish allergy   • Latex Other (see comments)     patient not sure   • Levonorgestrel-Ethinyl Estrad    • Naprelan    • Naproxen Other (see comments)     dysphagia throat burning   • Naproxen Sodium      Other reaction(s): lump in throat   • Penicillin G    • Penicillin V Potassium Hives   • Penicillins Hives   • Pineapple Other (see comments)   • Pumpkin Seed      Other reaction(s): throat gets tight   • Shellfish Derived Hives       HOME MEDICATIONS   •  gabapentin, Take 900 mg by mouth 3 (three) times a day.    •  hydrochlorothiazide, TAKE 0.5  "TABLETS (12.5 MG TOTAL) BY MOUTH DAILY.  •  b complex vitamins, Take 400 mg by mouth daily.  •  cetirizine, Take 1 tablet by mouth daily.  •  cholecalciferol (vitamin D3), take 1 tablet by oral route  every day  •  fluticasone propionate, SPRAY 2 SPRAYS INTO EACH NOSTRIL EVERY DAY  •  folic acid/multivit,iron, (CENTRUM ORAL), Take by mouth.  •  KLOR-CON M10, TAKE 1 TABLET BY MOUTH EVERY DAY  •  meloxicam, Take 7.5 mg by mouth daily.  •  pantoprazole, TAKE 1 TABLET BY MOUTH EVERY DAY  •  rosuvastatin, Take 1 tablet (5 mg total) by mouth once daily.  •  valACYclovir, Take 500 mg by mouth every 12 (twelve) hours.     PHYSICAL EXAM   Visit Vitals  BP (!) 131/95   Pulse 98   Temp 37.1 °C (98.7 °F) (Temporal)   Resp 16   Ht 1.461 m (4' 9.5\")   Wt 50.8 kg (112 lb)   SpO2 95%   BMI 23.82 kg/m²       General appearance: no distress  Head: normocephalic  Lungs: clear to auscultation anteriorly   Heart: regular rate   Abdomen: soft, non-tender; bowel sounds normal; no masses, no organomegaly  Neurologic: awake and alert and oriented     ASSESSMENT AND PLAN   Assessment   History of CRC in both paternal and maternal grandfather  High risk patient for CRC    - Screening colonoscopy today         Fran Pollard DO  2/5/2021         "

## 2021-02-08 LAB
CASE RPRT: NORMAL
CLINICAL INFO: NORMAL
PATH REPORT.FINAL DX SPEC: NORMAL
PATH REPORT.GROSS SPEC: NORMAL

## 2021-02-10 RX ORDER — ROSUVASTATIN CALCIUM 5 MG/1
TABLET, COATED ORAL
Qty: 90 TABLET | Refills: 1 | Status: SHIPPED | OUTPATIENT
Start: 2021-02-10 | End: 2021-10-07 | Stop reason: SDUPTHER

## 2021-02-23 ENCOUNTER — TELEPHONE (OUTPATIENT)
Dept: INTERNAL MEDICINE | Facility: CLINIC | Age: 56
End: 2021-02-23

## 2021-02-23 NOTE — TELEPHONE ENCOUNTER
Pt called to request appt, not a sick visit, needs f/u. I told her we're scheduling for May she said she'll need to speak to you sooner and has questions. I asked her what in regards to, she said she has questions about Prolia, others regarding her medications and if you'd recommend she go on certain ones. She'd like a c/b or an appt sooner. Please let me know so I can call her back, ph# [phone]

## 2021-03-02 ENCOUNTER — OFFICE VISIT (OUTPATIENT)
Dept: INTERNAL MEDICINE | Facility: CLINIC | Age: 56
End: 2021-03-02
Payer: MEDICARE

## 2021-03-02 VITALS
HEART RATE: 78 BPM | DIASTOLIC BLOOD PRESSURE: 76 MMHG | OXYGEN SATURATION: 98 % | HEIGHT: 58 IN | RESPIRATION RATE: 16 BRPM | WEIGHT: 112 LBS | SYSTOLIC BLOOD PRESSURE: 114 MMHG | TEMPERATURE: 97.2 F | BODY MASS INDEX: 23.51 KG/M2

## 2021-03-02 DIAGNOSIS — I10 ESSENTIAL HYPERTENSION: ICD-10-CM

## 2021-03-02 DIAGNOSIS — E55.9 VITAMIN D DEFICIENCY: ICD-10-CM

## 2021-03-02 DIAGNOSIS — G95.9 CERVICAL MYELOPATHY (CMS/HCC): ICD-10-CM

## 2021-03-02 DIAGNOSIS — E78.5 HYPERLIPIDEMIA, UNSPECIFIED HYPERLIPIDEMIA TYPE: ICD-10-CM

## 2021-03-02 DIAGNOSIS — M81.0 OSTEOPOROSIS WITHOUT CURRENT PATHOLOGICAL FRACTURE, UNSPECIFIED OSTEOPOROSIS TYPE: ICD-10-CM

## 2021-03-02 DIAGNOSIS — R73.9 HYPERGLYCEMIA: ICD-10-CM

## 2021-03-02 PROBLEM — R42 DIZZINESS AND GIDDINESS: Status: ACTIVE | Noted: 2021-03-02

## 2021-03-02 PROBLEM — G44.329 CHRONIC POST-TRAUMATIC HEADACHE: Status: ACTIVE | Noted: 2021-03-02

## 2021-03-02 PROBLEM — D25.9 LEIOMYOMA OF UTERUS: Status: ACTIVE | Noted: 2021-03-02

## 2021-03-02 PROBLEM — G89.4 CHRONIC PAIN SYNDROME: Status: ACTIVE | Noted: 2021-03-02

## 2021-03-02 PROCEDURE — 99214 OFFICE O/P EST MOD 30 MIN: CPT | Performed by: INTERNAL MEDICINE

## 2021-03-02 NOTE — PROGRESS NOTES
Subjective      Patient ID: Rajwinder Wynne is a 55 y.o. female       HPI       Here for follow up on ongoing issues  She saw dr Crow and was advised to start prolia as her bone density has worsened  She has been taking the rosuvastatin daily and has an upcoming appt with dr Gaona  She had her first COVID vaccine today  She had EGD and colonoscopy with dr Ordaz last month as she was having issues with dysphagia and has family history of colon cancer    Past Medical History:   Diagnosis Date   • Allergic rhinitis    • Cerebral cavernoma     parietal cavernoma   • Cervical myelopathy (CMS/HCC)    • Cervical radiculopathy    • Cervicogenic headache    • Closed fracture of sesamoid bone of foot    • Cricopharyngeal dysphagia    • Fibroids    • Food allergy    • GERD (gastroesophageal reflux disease)    • Lipid disorder    • Nephrolithiasis    • Osteoporosis        Past Surgical History:   Procedure Laterality Date   • ANTERIOR FUSION CERVICAL SPINE      c6-7   • BUNIONECTOMY     • CRICOPHARYNGEAL MYOTOMY     • KNEE SURGERY     • LUMBAR FUSION      L5-S1   • MYOMECTOMY     • THROAT SURGERY     • TIBIAL SESAMOID EXCISION         Family History   Problem Relation Age of Onset   • Hyperlipidemia Biological Mother    • Hypertension Biological Mother    • Pulmonary fibrosis Biological Father    • Colon cancer Maternal Grandfather    • Breast cancer Mother's Sister    • Ovarian cancer Father's Sister        Social History     Socioeconomic History   • Marital status:      Spouse name: Not on file   • Number of children: Not on file   • Years of education: Not on file   • Highest education level: Not on file   Occupational History   • Not on file   Social Needs   • Financial resource strain: Not on file   • Food insecurity     Worry: Not on file     Inability: Not on file   • Transportation needs     Medical: Not on file     Non-medical: Not on file   Tobacco Use   • Smoking status: Never Smoker   • Smokeless  tobacco: Never Used   Substance and Sexual Activity   • Alcohol use: No   • Drug use: No   • Sexual activity: Not on file   Lifestyle   • Physical activity     Days per week: Not on file     Minutes per session: Not on file   • Stress: Not on file   Relationships   • Social connections     Talks on phone: Not on file     Gets together: Not on file     Attends Shinto service: Not on file     Active member of club or organization: Not on file     Attends meetings of clubs or organizations: Not on file     Relationship status: Not on file   • Intimate partner violence     Fear of current or ex partner: Not on file     Emotionally abused: Not on file     Physically abused: Not on file     Forced sexual activity: Not on file   Other Topics Concern   • Not on file   Social History Narrative   • Not on file       Allergies   Allergen Reactions   • Liriano Flavor Other (see comments)   • Shellfish Containing Products Anaphylaxis     Pumpkin seeds   • Adhesive      Steri strips   • [place] Other (see comments)   • Banana Other (see comments)     also allergic to almonds, berries   • Erythromycin    • Erythromycin Base Other (see comments)     GI Upset  Other reaction(s): GI side effects   • Iodine Other (see comments)     shell fish allergy   • Latex Other (see comments)     patient not sure   • Levonorgestrel-Ethinyl Estrad    • Naprelan    • Naproxen Other (see comments)     dysphagia throat burning   • Naproxen Sodium      Other reaction(s): lump in throat   • Penicillin G    • Penicillin V Potassium Hives   • Penicillins Hives   • Pineapple Other (see comments)   • Pumpkin Seed      Other reaction(s): throat gets tight   • Shellfish Derived Hives       Current Outpatient Medications   Medication Sig Dispense Refill   • b complex vitamins tablet Take 400 mg by mouth daily.     • cetirizine (ZyrTEC) 10 mg tablet Take 1 tablet by mouth daily.     • cholecalciferol, vitamin D3, (VITAMIN D3) 1,000 unit capsule take 1 tablet  "by oral route  every day     • fluticasone propionate (FLONASE) 50 mcg/actuation nasal spray SPRAY 2 SPRAYS INTO EACH NOSTRIL EVERY DAY 48 mL 1   • folic acid/multivit,iron, (CENTRUM ORAL) Take by mouth.     • gabapentin (NEURONTIN) 300 mg capsule Take 900 mg by mouth 3 (three) times a day.       • hydrochlorothiazide (HYDRODIURIL) 25 mg tablet TAKE 0.5 TABLETS (12.5 MG TOTAL) BY MOUTH DAILY. 45 tablet 1   • KLOR-CON M10 10 mEq CR tablet TAKE 1 TABLET BY MOUTH EVERY DAY 90 tablet 1   • meloxicam (MOBIC) 7.5 mg tablet Take 7.5 mg by mouth daily.     • pantoprazole (PROTONIX) 40 mg EC tablet TAKE 1 TABLET BY MOUTH EVERY DAY 90 tablet 1   • rosuvastatin (CRESTOR) 5 mg tablet TAKE 1 TABLET BY MOUTH EVERY DAY 90 tablet 1   • valACYclovir (VALTREX) 500 mg tablet Take 500 mg by mouth every 12 (twelve) hours.       No current facility-administered medications for this visit.        Visit Vitals  /76 (BP Location: Right upper arm, Patient Position: Sitting)   Pulse 78   Temp 36.2 °C (97.2 °F) (Temporal)   Resp 16   Ht 1.461 m (4' 9.5\")   Wt 50.8 kg (112 lb)   SpO2 98%   BMI 23.82 kg/m²         The following have been reviewed and updated as appropriate in this visit:  Allergies  Meds  Problems       Review of Systems   Constitutional: Negative for chills, fatigue and fever.   HENT: Negative for ear pain and sore throat.    Eyes: Negative for pain and visual disturbance.   Respiratory: Negative for cough and shortness of breath.    Cardiovascular: Negative for chest pain, palpitations and leg swelling.   Gastrointestinal: Negative for abdominal pain, blood in stool, diarrhea, nausea and vomiting.   Genitourinary: Negative for dysuria and hematuria.   Musculoskeletal: Positive for neck pain. Negative for back pain.   Skin: Negative for rash.   Allergic/Immunologic: Negative for environmental allergies.   Neurological: Negative for dizziness, numbness and headaches.   Hematological: Does not bruise/bleed easily. "   Psychiatric/Behavioral: Negative for confusion.       Objective       Physical Exam  Vitals signs and nursing note reviewed.   Constitutional:       Appearance: She is well-developed.   HENT:      Head: Normocephalic and atraumatic.      Right Ear: External ear normal.      Left Ear: External ear normal.      Nose: Nose normal.   Eyes:      Conjunctiva/sclera: Conjunctivae normal.      Pupils: Pupils are equal, round, and reactive to light.   Neck:      Musculoskeletal: Normal range of motion and neck supple.      Thyroid: No thyromegaly.   Cardiovascular:      Rate and Rhythm: Normal rate and regular rhythm.      Heart sounds: Normal heart sounds.   Pulmonary:      Effort: Pulmonary effort is normal. No respiratory distress.      Breath sounds: Normal breath sounds.   Chest:      Chest wall: No tenderness.   Abdominal:      General: Bowel sounds are normal.      Palpations: Abdomen is soft.      Tenderness: There is no abdominal tenderness. There is no guarding.   Musculoskeletal: Normal range of motion.         General: No tenderness.      Comments: Tenderness cervical muscles   Lymphadenopathy:      Cervical: No cervical adenopathy.   Skin:     General: Skin is warm and dry.      Findings: No rash.      Comments: Healed anterior neck scar   Neurological:      Mental Status: She is alert and oriented to person, place, and time.      Cranial Nerves: No cranial nerve deficit.      Sensory: No sensory deficit.   Psychiatric:         Behavior: Behavior normal.         Assessment/Plan     Hyperlipidemia  Controlled  Her LDL is at goal  She will continue rosuvastatin 5 mg daily  Check labs  She will see dr Gaona next month    Essential hypertension  BP is stable  She will cont HCTZ 12.5 mg daily    Osteoporosis without current pathological fracture  Not controlled  reviewed the DEXA scan results in detail and agree with Dr Crow that she needs to start meds  reviewed oral meds and also prolia  She will begin prolia  after she completes the COVID vaccine   Discussed calcium and vit D intake and regular weight bearing exercise    Vitamin D deficiency  Fair control  Cont vitamin D3,1000 units daily  Check labs    Hyperglycemia  Check fasting blood sugar and A1C    Cervical myelopathy (CMS/HCC)  Persisting symptoms  She will continue gabapentin 600 mg three times daily  Follow up with neurology Dr Arnett  She sees him in July yearly        Maya Tesfaye MD  3/3/2021  7:26 AM

## 2021-03-02 NOTE — PATIENT INSTRUCTIONS
PLAN      Get fasting blood work  See dr Gaona  Follow up with Dr Crow  Cont the meds  See me in 3  months

## 2021-03-03 ASSESSMENT — ENCOUNTER SYMPTOMS
CONFUSION: 0
FEVER: 0
COUGH: 0
BRUISES/BLEEDS EASILY: 0
ABDOMINAL PAIN: 0
BLOOD IN STOOL: 0
PALPITATIONS: 0
HEMATURIA: 0
EYE PAIN: 0
NECK PAIN: 1
NUMBNESS: 0
NAUSEA: 0
DIZZINESS: 0
VOMITING: 0
CHILLS: 0
SORE THROAT: 0
HEADACHES: 0
FATIGUE: 0
BACK PAIN: 0
SHORTNESS OF BREATH: 0
DYSURIA: 0
DIARRHEA: 0

## 2021-03-03 NOTE — ASSESSMENT & PLAN NOTE
Controlled  Her LDL is at goal  She will continue rosuvastatin 5 mg daily  Check labs  She will see dr Gaona next month

## 2021-03-03 NOTE — ASSESSMENT & PLAN NOTE
Not controlled  reviewed the DEXA scan results in detail and agree with Dr Crow that she needs to start meds  reviewed oral meds and also prolia  She will begin prolia after she completes the COVID vaccine   Discussed calcium and vit D intake and regular weight bearing exercise

## 2021-03-03 NOTE — ASSESSMENT & PLAN NOTE
Persisting symptoms  She will continue gabapentin 600 mg three times daily  Follow up with neurology Dr Arnett  She sees him in July yearly

## 2021-03-05 RX ORDER — PANTOPRAZOLE SODIUM 40 MG/1
TABLET, DELAYED RELEASE ORAL
Qty: 90 TABLET | Refills: 1 | Status: SHIPPED | OUTPATIENT
Start: 2021-03-05 | End: 2021-10-05 | Stop reason: SDUPTHER

## 2021-03-30 ENCOUNTER — TELEPHONE (OUTPATIENT)
Dept: INTERNAL MEDICINE | Facility: CLINIC | Age: 56
End: 2021-03-30

## 2021-03-30 NOTE — TELEPHONE ENCOUNTER
Pt called stating she is getting her second covid vaccine Thursday but currently has a sore throat, would like to know if she should be strep tested

## 2021-03-30 NOTE — TELEPHONE ENCOUNTER
I would need to examine her throat to determine if she needs a strep test, but she should also probably be tested for covid, you can put her on with me this afternoon if she can come in or you can put her on for tomorrow with Walter

## 2021-03-31 ENCOUNTER — OFFICE VISIT (OUTPATIENT)
Dept: INTERNAL MEDICINE | Facility: CLINIC | Age: 56
End: 2021-03-31
Payer: MEDICARE

## 2021-03-31 VITALS
SYSTOLIC BLOOD PRESSURE: 120 MMHG | WEIGHT: 112 LBS | TEMPERATURE: 97.1 F | DIASTOLIC BLOOD PRESSURE: 70 MMHG | HEART RATE: 71 BPM | BODY MASS INDEX: 23.51 KG/M2 | RESPIRATION RATE: 16 BRPM | HEIGHT: 58 IN | OXYGEN SATURATION: 98 %

## 2021-03-31 DIAGNOSIS — J06.9 VIRAL UPPER RESPIRATORY TRACT INFECTION: Primary | ICD-10-CM

## 2021-03-31 PROCEDURE — 99212 OFFICE O/P EST SF 10 MIN: CPT | Performed by: INTERNAL MEDICINE

## 2021-03-31 PROCEDURE — G8754 DIAS BP LESS 90: HCPCS | Performed by: INTERNAL MEDICINE

## 2021-03-31 PROCEDURE — G8752 SYS BP LESS 140: HCPCS | Performed by: INTERNAL MEDICINE

## 2021-03-31 ASSESSMENT — ENCOUNTER SYMPTOMS
RESPIRATORY NEGATIVE: 1
PSYCHIATRIC NEGATIVE: 1
GASTROINTESTINAL NEGATIVE: 1
ALLERGIC/IMMUNOLOGIC NEGATIVE: 1
CONSTITUTIONAL NEGATIVE: 1
MYALGIAS: 1
NEUROLOGICAL NEGATIVE: 1
EYES NEGATIVE: 1
CARDIOVASCULAR NEGATIVE: 1
ENDOCRINE NEGATIVE: 1
SORE THROAT: 1
HEMATOLOGIC/LYMPHATIC NEGATIVE: 1

## 2021-03-31 NOTE — PROGRESS NOTES
SUBJECTIVE:   55 y.o. female for a same day visit    The patient complains of sore throat for the past few days, without fevers or chills, no cough, no swollen glands that she has noticed.  Some pain on swallowing, pain is feeling much better today.  Some diffuse aches.  No sick contracts that she knows of.  She does note that she is prone to sore throats but that she has not had strep throat in many years.        Current Outpatient Medications   Medication Sig Dispense Refill   • b complex vitamins tablet Take 400 mg by mouth daily.     • cetirizine (ZyrTEC) 10 mg tablet Take 1 tablet by mouth daily.     • cholecalciferol, vitamin D3, (VITAMIN D3) 1,000 unit capsule take 1 tablet by oral route  every day     • fluticasone propionate (FLONASE) 50 mcg/actuation nasal spray SPRAY 2 SPRAYS INTO EACH NOSTRIL EVERY DAY 48 mL 1   • folic acid/multivit,iron, (CENTRUM ORAL) Take by mouth.     • gabapentin (NEURONTIN) 300 mg capsule Take 900 mg by mouth 3 (three) times a day.       • hydrochlorothiazide (HYDRODIURIL) 25 mg tablet TAKE 0.5 TABLETS (12.5 MG TOTAL) BY MOUTH DAILY. 45 tablet 1   • KLOR-CON M10 10 mEq CR tablet TAKE 1 TABLET BY MOUTH EVERY DAY 90 tablet 1   • meloxicam (MOBIC) 7.5 mg tablet Take 7.5 mg by mouth daily.     • pantoprazole (PROTONIX) 40 mg EC tablet TAKE 1 TABLET BY MOUTH EVERY DAY 90 tablet 1   • rosuvastatin (CRESTOR) 5 mg tablet TAKE 1 TABLET BY MOUTH EVERY DAY 90 tablet 1   • valACYclovir (VALTREX) 500 mg tablet Take 500 mg by mouth every 12 (twelve) hours.       No current facility-administered medications for this visit.      Allergies: Berry flavor, Shellfish containing products, Adhesive, [place], Banana, Erythromycin, Erythromycin base, Iodine, Latex, Levonorgestrel-ethinyl estrad, Naprelan, Naproxen, Naproxen sodium, Penicillin g, Penicillin v potassium, Penicillins, Pineapple, Pumpkin seed, and Shellfish derived         Past Medical History:   Diagnosis Date   • Allergic rhinitis    •  Cerebral cavernoma     parietal cavernoma   • Cervical myelopathy (CMS/HCC)    • Cervical radiculopathy    • Cervicogenic headache    • Closed fracture of sesamoid bone of foot    • Cricopharyngeal dysphagia    • Fibroids    • Food allergy    • GERD (gastroesophageal reflux disease)    • Lipid disorder    • Nephrolithiasis    • Osteoporosis      Patient Active Problem List   Diagnosis   • Cricopharyngeal dysphagia   • Hyperglycemia   • Vitamin D deficiency   • Osteoporosis without current pathological fracture   • Essential hypertension   • Cerebral cavernoma   • Otalgia of left ear   • Acute sinusitis   • Hyperlipidemia   • Cervical myelopathy (CMS/HCC)   • Allergy to shellfish   • History of URI (upper respiratory infection)   • Pain in both knees   • Left foot pain   • Allergy history, drug   • Cervicogenic headache   • Chronic pain syndrome   • Chronic post-traumatic headache   • Dizziness and giddiness   • Food allergy   • Heart murmur   • Hoarseness   • Nephrolithiasis   • Leiomyoma of uterus   • Neck pain   • Papanicolaou smear of cervix with low grade squamous intraepithelial lesion (LGSIL)   • Spinal cord disorder (CMS/HCC)   • Mouth sores     Past Surgical History:   Procedure Laterality Date   • ANTERIOR FUSION CERVICAL SPINE      c6-7   • BUNIONECTOMY     • CRICOPHARYNGEAL MYOTOMY     • KNEE SURGERY     • LUMBAR FUSION      L5-S1   • MYOMECTOMY     • THROAT SURGERY     • TIBIAL SESAMOID EXCISION       Family History   Problem Relation Age of Onset   • Hyperlipidemia Biological Mother    • Hypertension Biological Mother    • Pulmonary fibrosis Biological Father    • Colon cancer Maternal Grandfather    • Breast cancer Mother's Sister    • Ovarian cancer Father's Sister      Social History     Socioeconomic History   • Marital status:      Spouse name: Not on file   • Number of children: Not on file   • Years of education: Not on file   • Highest education level: Not on file   Occupational History    • Not on file   Social Needs   • Financial resource strain: Not on file   • Food insecurity     Worry: Not on file     Inability: Not on file   • Transportation needs     Medical: Not on file     Non-medical: Not on file   Tobacco Use   • Smoking status: Never Smoker   • Smokeless tobacco: Never Used   Substance and Sexual Activity   • Alcohol use: No   • Drug use: No   • Sexual activity: Not on file   Lifestyle   • Physical activity     Days per week: Not on file     Minutes per session: Not on file   • Stress: Not on file   Relationships   • Social connections     Talks on phone: Not on file     Gets together: Not on file     Attends Confucianism service: Not on file     Active member of club or organization: Not on file     Attends meetings of clubs or organizations: Not on file     Relationship status: Not on file   • Intimate partner violence     Fear of current or ex partner: Not on file     Emotionally abused: Not on file     Physically abused: Not on file     Forced sexual activity: Not on file   Other Topics Concern   • Not on file   Social History Narrative   • Not on file       Current Outpatient Medications:   •  b complex vitamins tablet, Take 400 mg by mouth daily., Disp: , Rfl:   •  cetirizine (ZyrTEC) 10 mg tablet, Take 1 tablet by mouth daily., Disp: , Rfl:   •  cholecalciferol, vitamin D3, (VITAMIN D3) 1,000 unit capsule, take 1 tablet by oral route  every day, Disp: , Rfl:   •  fluticasone propionate (FLONASE) 50 mcg/actuation nasal spray, SPRAY 2 SPRAYS INTO EACH NOSTRIL EVERY DAY, Disp: 48 mL, Rfl: 1  •  folic acid/multivit,iron, (CENTRUM ORAL), Take by mouth., Disp: , Rfl:   •  gabapentin (NEURONTIN) 300 mg capsule, Take 900 mg by mouth 3 (three) times a day.  , Disp: , Rfl:   •  hydrochlorothiazide (HYDRODIURIL) 25 mg tablet, TAKE 0.5 TABLETS (12.5 MG TOTAL) BY MOUTH DAILY., Disp: 45 tablet, Rfl: 1  •  KLOR-CON M10 10 mEq CR tablet, TAKE 1 TABLET BY MOUTH EVERY DAY, Disp: 90 tablet, Rfl:  "1  •  meloxicam (MOBIC) 7.5 mg tablet, Take 7.5 mg by mouth daily., Disp: , Rfl:   •  pantoprazole (PROTONIX) 40 mg EC tablet, TAKE 1 TABLET BY MOUTH EVERY DAY, Disp: 90 tablet, Rfl: 1  •  rosuvastatin (CRESTOR) 5 mg tablet, TAKE 1 TABLET BY MOUTH EVERY DAY, Disp: 90 tablet, Rfl: 1  •  valACYclovir (VALTREX) 500 mg tablet, Take 500 mg by mouth every 12 (twelve) hours., Disp: , Rfl:    Allergies   Allergen Reactions   • Liriano Flavor Other (see comments)   • Shellfish Containing Products Anaphylaxis     Pumpkin seeds   • Adhesive      Steri strips   • [place] Other (see comments)   • Banana Other (see comments)     also allergic to almonds, berries   • Erythromycin    • Erythromycin Base Other (see comments)     GI Upset  Other reaction(s): GI side effects   • Iodine Other (see comments)     shell fish allergy   • Latex Other (see comments)     patient not sure   • Levonorgestrel-Ethinyl Estrad    • Naprelan    • Naproxen Other (see comments)     dysphagia throat burning   • Naproxen Sodium      Other reaction(s): lump in throat   • Penicillin G    • Penicillin V Potassium Hives   • Penicillins Hives   • Pineapple Other (see comments)   • Pumpkin Seed      Other reaction(s): throat gets tight   • Shellfish Derived Hives       Review of Systems   Constitutional: Negative.    HENT: Positive for sore throat.    Eyes: Negative.    Respiratory: Negative.    Cardiovascular: Negative.    Gastrointestinal: Negative.    Endocrine: Negative.    Genitourinary: Negative.    Musculoskeletal: Positive for myalgias.   Skin: Negative.    Allergic/Immunologic: Negative.    Neurological: Negative.    Hematological: Negative.    Psychiatric/Behavioral: Negative.         OBJECTIVE:  Visit Vitals  /70   Pulse 71   Temp 36.2 °C (97.1 °F) (Temporal)   Resp 16   Ht 1.461 m (4' 9.5\")   Wt 50.8 kg (112 lb) Comment: Pt reported   SpO2 98%   BMI 23.82 kg/m²      Gait:  Normal  Alert and well appearing   Awake and oriented with normal affect " and appropriate behavior   HEENT: Normocephalic and atraumatic, pupils equal, round, OP clear with moist mucous membranes, mild cobblestoning of posterior oropharynx, tonsils surgically absent  Neck:  supple, no thyroid enlargement, no LAD  Lungs: Normal breath sounds, lungs CTA with no RRW  Heart:Regular rate rhythm with no murmurs, gallops or rubs   Skin:  No lesions on exposed skin      Assessment/Plan     1. Viral upper respiratory tract infection  - sx improving, likely 2/2 viral infection  - advised patient get covid-19 swab, she adamantly refused a PCR swab as this would take too long to result and she has her second covid-19 vaccine tomorrow  - after some discussion she agreed to try and get a rapid test today, provided with locations that may have these available  - I strongly advised that if she cannot get tested she should postpone her covid vaccine until she is symptom free and to also postpone if her covid test is positive

## 2021-04-05 ENCOUNTER — OFFICE VISIT (OUTPATIENT)
Dept: CARDIOLOGY | Facility: CLINIC | Age: 56
End: 2021-04-05
Payer: MEDICARE

## 2021-04-05 VITALS
BODY MASS INDEX: 23.72 KG/M2 | HEART RATE: 82 BPM | DIASTOLIC BLOOD PRESSURE: 82 MMHG | SYSTOLIC BLOOD PRESSURE: 132 MMHG | RESPIRATION RATE: 14 BRPM | HEIGHT: 58 IN | WEIGHT: 113 LBS | OXYGEN SATURATION: 99 % | TEMPERATURE: 97.3 F

## 2021-04-05 DIAGNOSIS — I10 ESSENTIAL HYPERTENSION: Primary | ICD-10-CM

## 2021-04-05 DIAGNOSIS — Z82.49 FAMILY HISTORY OF ATHEROSCLEROSIS: ICD-10-CM

## 2021-04-05 DIAGNOSIS — E78.00 PURE HYPERCHOLESTEROLEMIA: ICD-10-CM

## 2021-04-05 DIAGNOSIS — R03.0 BORDERLINE HYPERTENSION: ICD-10-CM

## 2021-04-05 DIAGNOSIS — R03.0 ELEVATED BLOOD PRESSURE READING IN OFFICE WITHOUT DIAGNOSIS OF HYPERTENSION: ICD-10-CM

## 2021-04-05 PROCEDURE — G8752 SYS BP LESS 140: HCPCS | Performed by: INTERNAL MEDICINE

## 2021-04-05 PROCEDURE — 93000 ELECTROCARDIOGRAM COMPLETE: CPT | Performed by: INTERNAL MEDICINE

## 2021-04-05 PROCEDURE — 99204 OFFICE O/P NEW MOD 45 MIN: CPT | Performed by: INTERNAL MEDICINE

## 2021-04-05 PROCEDURE — G8754 DIAS BP LESS 90: HCPCS | Performed by: INTERNAL MEDICINE

## 2021-04-05 RX ORDER — DENOSUMAB 60 MG/ML
60 INJECTION SUBCUTANEOUS ONCE
COMMUNITY

## 2021-04-05 ASSESSMENT — ENCOUNTER SYMPTOMS
COUGH: 0
DIZZINESS: 1
SHORTNESS OF BREATH: 0
WEIGHT LOSS: 0
PALPITATIONS: 0
CLAUDICATION: 0
HEARTBURN: 0
DYSPNEA ON EXERTION: 0
WEIGHT GAIN: 0
MEMORY LOSS: 0
DEPRESSION: 0
SNORING: 0
DIAPHORESIS: 0
NAUSEA: 1
BACK PAIN: 1

## 2021-04-05 NOTE — LETTER
April 5, 2021     Maya Tesfaye MD  [address] [place] [address]    Patient: Rajwinder Wynne  YOB: 1965  Date of Visit: 4/5/2021      Dear Dr. Tesfaye:    Thank you for referring Rajwinder Wynne to me for evaluation. Below are my notes for this consultation.    If you have questions, please do not hesitate to call me. I look forward to following your patient along with you.         Sincerely,        eKvon Gaona MD        CC: No Recipients  Kevon Gaona MD  4/5/2021  4:43 PM  Signed     Cardiology Note          HPI   Rajwinder Wynne is a 55 y.o. female presents for a new patient visit.  She was last seen in the office in January of 2018.  She reports her appointment today is for a cardiac check up.    She walks several miles a day with no symptoms however has been limited over the past several months due to vertigo.  She has had ongoing C-s-spine issues.    She walked 2 miles this morning, up hills/inclines, with no symptoms.    She has a history of cavernoma in right brain.  She follows with neurology and Dr. Trujillo in neurosurgery.  She denies chest distress, BRENNER palpitations, PND/orthopnea and pre-syncope/syncope.        Past Medical History:   Diagnosis Date   • Allergic rhinitis    • Cerebral cavernoma     parietal cavernoma   • Cervical myelopathy (CMS/HCC)    • Cervical radiculopathy    • Cervicogenic headache    • Closed fracture of sesamoid bone of foot    • Cricopharyngeal dysphagia    • Fibroids    • Food allergy    • GERD (gastroesophageal reflux disease)    • Lipid disorder    • Nephrolithiasis    • Osteoporosis      Past Surgical History:   Procedure Laterality Date   • ANTERIOR FUSION CERVICAL SPINE      c6-7   • BUNIONECTOMY     • CRICOPHARYNGEAL MYOTOMY     • KNEE SURGERY     • LUMBAR FUSION      L5-S1   • MYOMECTOMY     • THROAT SURGERY     • TIBIAL SESAMOID EXCISION       Social History     Socioeconomic History   • Marital status:      Spouse  name: Not on file   • Number of children: Not on file   • Years of education: Not on file   • Highest education level: Not on file   Occupational History   • Not on file   Social Needs   • Financial resource strain: Not on file   • Food insecurity     Worry: Not on file     Inability: Not on file   • Transportation needs     Medical: Not on file     Non-medical: Not on file   Tobacco Use   • Smoking status: Never Smoker   • Smokeless tobacco: Never Used   Substance and Sexual Activity   • Alcohol use: No   • Drug use: No   • Sexual activity: Not on file   Lifestyle   • Physical activity     Days per week: Not on file     Minutes per session: Not on file   • Stress: Not on file   Relationships   • Social connections     Talks on phone: Not on file     Gets together: Not on file     Attends Zoroastrian service: Not on file     Active member of club or organization: Not on file     Attends meetings of clubs or organizations: Not on file     Relationship status: Not on file   • Intimate partner violence     Fear of current or ex partner: Not on file     Emotionally abused: Not on file     Physically abused: Not on file     Forced sexual activity: Not on file   Other Topics Concern   • Not on file   Social History Narrative   • Not on file     Family History   Problem Relation Age of Onset   • Hyperlipidemia Biological Mother    • Hypertension Biological Mother    • Pulmonary fibrosis Biological Father    • Heart attack Biological Father    • Colon cancer Maternal Grandfather    • Breast cancer Mother's Sister    • Ovarian cancer Father's Sister      Liriano flavor, Shellfish containing products, Adhesive, [place], Banana, Erythromycin, Erythromycin base, Iodine, Latex, Levonorgestrel-ethinyl estrad, Naprelan, Naproxen, Naproxen sodium, Penicillin g, Penicillin v potassium, Penicillins, Pineapple, Pumpkin seed, and Shellfish derived  Current Outpatient Medications   Medication Sig Dispense Refill   • b complex vitamins tablet  Take 400 mg by mouth daily.     • cetirizine (ZyrTEC) 10 mg tablet Take 1 tablet by mouth daily.     • cholecalciferol, vitamin D3, (VITAMIN D3) 1,000 unit capsule take 1 tablet by oral route  every day     • denosumab (PROLIA) 60 mg/mL syringe Inject 60 mg under the skin once.     • fluticasone propionate (FLONASE) 50 mcg/actuation nasal spray SPRAY 2 SPRAYS INTO EACH NOSTRIL EVERY DAY 48 mL 1   • folic acid/multivit,iron, (CENTRUM ORAL) Take by mouth.     • gabapentin (NEURONTIN) 300 mg capsule Take 900 mg by mouth 3 (three) times a day.       • hydrochlorothiazide (HYDRODIURIL) 25 mg tablet TAKE 0.5 TABLETS (12.5 MG TOTAL) BY MOUTH DAILY. 45 tablet 1   • KLOR-CON M10 10 mEq CR tablet TAKE 1 TABLET BY MOUTH EVERY DAY 90 tablet 1   • meloxicam (MOBIC) 7.5 mg tablet Take 7.5 mg by mouth as needed.       • pantoprazole (PROTONIX) 40 mg EC tablet TAKE 1 TABLET BY MOUTH EVERY DAY (Patient taking differently: Take by mouth as needed.  ) 90 tablet 1   • rosuvastatin (CRESTOR) 5 mg tablet TAKE 1 TABLET BY MOUTH EVERY DAY 90 tablet 1     No current facility-administered medications for this visit.        Review of Systems   Constitution: Negative for diaphoresis, weight gain and weight loss.   Eyes: Negative for visual disturbance.   Cardiovascular: Negative for chest pain, claudication, dyspnea on exertion, leg swelling and palpitations.   Respiratory: Negative for cough, shortness of breath and snoring.    Skin: Negative for rash.   Musculoskeletal: Positive for arthritis and back pain. Negative for joint pain and muscle weakness.   Gastrointestinal: Positive for nausea. Negative for heartburn.   Neurological: Positive for dizziness.   Psychiatric/Behavioral: Negative for depression and memory loss.     Objective   Vitals:    04/05/21 1459   BP: 132/82   Pulse: 82   Resp: 14   Temp: 36.3 °C (97.3 °F)   SpO2: 99%   BP equal in both arms     Physical Exam   Constitutional: She is oriented to person, place, and time.  "She appears well-developed and well-nourished.   HENT:   Head: Normocephalic.   Eyes:   Fundoscopic exam:       The right eye shows no arteriolar narrowing and no AV nicking.        The left eye shows no arteriolar narrowing and no AV nicking.   Neck: No JVD present.   Cardiovascular: Normal rate and normal heart sounds.   Pulmonary/Chest: Breath sounds normal.   Abdominal: Soft. Bowel sounds are normal.   Musculoskeletal:         General: No edema.   Neurological: She is alert and oriented to person, place, and time.   Skin: No rash noted.   Psychiatric: Her behavior is normal.       Lab Results   Component Value Date    WBC 6.84 06/26/2020    HGB 13.8 06/26/2020     06/26/2020    CHOL 175 06/26/2020    TRIG 80 06/26/2020    HDL 73 06/26/2020    ALT 18 06/26/2020    AST 24 06/26/2020     06/26/2020    K 4.1 06/26/2020    CREATININE 0.7 06/26/2020    TSH 3.79 06/26/2020    INR 0.9 03/23/2019    HGBA1C 5.2 06/26/2020        ECG   Sinus, within normal limits          Wt Readings from Last 3 Encounters:   04/05/21 51.3 kg (113 lb)   03/31/21 50.8 kg (112 lb)   03/02/21 50.8 kg (112 lb)       Problem List Items Addressed This Visit        Unprioritized    Borderline hypertension - Primary     Her blood pressure will be elevated during times of emotional distress and high salt intake.    She reports her blood pressures have been stable.    She takes HCTZ for kidney stones.    Blood pressure mildly elevated at time of visit today. She admits to \"white coat syndrome\"  Given low salt handout.  She will monitor her b/p's and contact our office if registering >130's/80's on consistent basis.           Hyperlipidemia     , Trig 80, HDL 73 and LDL of 86 on 6/20 lipids.  She had been started on rosuvastatin 5 mg several months prior to labs.    She is to undergo fasting labs in the near future.    Coronary calcium score of 0 on 1/19 study.         Family history of atherosclerosis     Her father with a history " of MI at age 79.    Coronary calcium score of 0 on 1/19 study.                  I, MARY Lopez, am scribing for, and in the presence of, Kevon Gaona MD.    4/5/2021 4:34 PM   I, Kevon Gaona MD,  personally performed the services described in this documentation as scribed by Miriam Girard in my presence, and it is both accurate and complete.    4/5/2021 4:35 PM    She will return in a couple years.   I spent 48 minutes on this date of service performing the following activities: obtaining history, performing examination, entering orders, documenting, preparing for visit, obtaining / reviewing records, providing counseling and education, independently reviewing study/studies and communicating results.  Kevon Gaona MD  4/5/2021

## 2021-04-05 NOTE — ASSESSMENT & PLAN NOTE
, Trig 80, HDL 73 and LDL of 86 on 6/20 lipids.  She had been started on rosuvastatin 5 mg several months prior to labs.    She is to undergo fasting labs in the near future.    Coronary calcium score of 0 on 1/19 study.

## 2021-04-05 NOTE — ASSESSMENT & PLAN NOTE
"Her blood pressure will be elevated during times of emotional distress and high salt intake.    She reports her blood pressures have been stable.    She takes HCTZ for kidney stones.    Blood pressure mildly elevated at time of visit today. She admits to \"white coat syndrome\"  Given low salt handout.  She will monitor her b/p's and contact our office if registering >130's/80's on consistent basis.    I advised urine check with blood check.   "

## 2021-04-08 RX ORDER — HYDROCHLOROTHIAZIDE 25 MG/1
TABLET ORAL
Qty: 45 TABLET | Refills: 3 | Status: SHIPPED | OUTPATIENT
Start: 2021-04-08 | End: 2021-12-22 | Stop reason: SDUPTHER

## 2021-04-15 ENCOUNTER — TELEPHONE (OUTPATIENT)
Dept: SCHEDULING | Facility: CLINIC | Age: 56
End: 2021-04-15

## 2021-04-15 ENCOUNTER — APPOINTMENT (OUTPATIENT)
Dept: LAB | Facility: CLINIC | Age: 56
End: 2021-04-15
Attending: INTERNAL MEDICINE
Payer: MEDICARE

## 2021-04-15 DIAGNOSIS — E55.9 VITAMIN D DEFICIENCY: ICD-10-CM

## 2021-04-15 DIAGNOSIS — E78.00 PURE HYPERCHOLESTEROLEMIA: ICD-10-CM

## 2021-04-15 DIAGNOSIS — Z23 ENCOUNTER FOR IMMUNIZATION: ICD-10-CM

## 2021-04-15 DIAGNOSIS — R03.0 ELEVATED BLOOD PRESSURE READING IN OFFICE WITHOUT DIAGNOSIS OF HYPERTENSION: ICD-10-CM

## 2021-04-15 DIAGNOSIS — R73.9 HYPERGLYCEMIA: ICD-10-CM

## 2021-04-15 DIAGNOSIS — E78.5 HYPERLIPIDEMIA, UNSPECIFIED HYPERLIPIDEMIA TYPE: ICD-10-CM

## 2021-04-15 LAB
25(OH)D3 SERPL-MCNC: 59 NG/ML (ref 30–100)
ALBUMIN SERPL-MCNC: 4.2 G/DL (ref 3.4–5)
ALBUMIN/CREAT UR: 3.3 UG/MG
ALP SERPL-CCNC: 83 IU/L (ref 35–126)
ALT SERPL-CCNC: 20 IU/L (ref 11–54)
ANION GAP SERPL CALC-SCNC: 12 MEQ/L (ref 3–15)
AST SERPL-CCNC: 24 IU/L (ref 15–41)
BACTERIA URNS QL MICRO: ABNORMAL /HPF
BASOPHILS # BLD: 0.1 K/UL (ref 0.01–0.1)
BASOPHILS NFR BLD: 1.6 %
BILIRUB SERPL-MCNC: 0.7 MG/DL (ref 0.3–1.2)
BILIRUB UR QL STRIP.AUTO: NEGATIVE MG/DL
BUN SERPL-MCNC: 17 MG/DL (ref 8–20)
CALCIUM SERPL-MCNC: 10 MG/DL (ref 8.9–10.3)
CHLORIDE SERPL-SCNC: 101 MEQ/L (ref 98–109)
CHOLEST SERPL-MCNC: 149 MG/DL
CK SERPL-CCNC: 55 U/L (ref 15–200)
CLARITY UR REFRACT.AUTO: ABNORMAL
CO2 SERPL-SCNC: 27 MEQ/L (ref 22–32)
COLOR UR AUTO: YELLOW
CREAT SERPL-MCNC: 0.7 MG/DL (ref 0.6–1.1)
CREAT UR-MCNC: 183.3 MG/DL
CRP SERPL-MCNC: 6.14 MG/L
DIFFERENTIAL METHOD BLD: ABNORMAL
EOSINOPHIL # BLD: 0.55 K/UL (ref 0.04–0.36)
EOSINOPHIL NFR BLD: 8.6 %
ERYTHROCYTE [DISTWIDTH] IN BLOOD BY AUTOMATED COUNT: 13.3 % (ref 11.7–14.4)
EST. AVERAGE GLUCOSE BLD GHB EST-MCNC: 105 MG/DL
GFR SERPL CREATININE-BSD FRML MDRD: >60 ML/MIN/1.73M*2
GLUCOSE SERPL-MCNC: 94 MG/DL (ref 70–99)
GLUCOSE UR STRIP.AUTO-MCNC: NEGATIVE MG/DL
HBA1C MFR BLD HPLC: 5.3 %
HCT VFR BLDCO AUTO: 42.7 % (ref 35–45)
HDLC SERPL-MCNC: 63 MG/DL
HDLC SERPL: 2.4 {RATIO}
HGB BLD-MCNC: 14 G/DL (ref 11.8–15.7)
HGB UR QL STRIP.AUTO: NEGATIVE
HYALINE CASTS #/AREA URNS LPF: ABNORMAL /LPF
IMM GRANULOCYTES # BLD AUTO: 0.01 K/UL (ref 0–0.08)
IMM GRANULOCYTES NFR BLD AUTO: 0.2 %
KETONES UR STRIP.AUTO-MCNC: NEGATIVE MG/DL
LDLC SERPL CALC-MCNC: 76 MG/DL
LEUKOCYTE ESTERASE UR QL STRIP.AUTO: NEGATIVE
LYMPHOCYTES # BLD: 2.64 K/UL (ref 1.2–3.5)
LYMPHOCYTES NFR BLD: 41.3 %
MCH RBC QN AUTO: 33.2 PG (ref 28–33.2)
MCHC RBC AUTO-ENTMCNC: 32.8 G/DL (ref 32.2–35.5)
MCV RBC AUTO: 101.2 FL (ref 83–98)
MICROALBUMIN UR-MCNC: 6 MG/L
MONOCYTES # BLD: 0.57 K/UL (ref 0.28–0.8)
MONOCYTES NFR BLD: 8.9 %
NEUTROPHILS # BLD: 2.52 K/UL (ref 1.7–7)
NEUTS SEG NFR BLD: 39.4 %
NITRITE UR QL STRIP.AUTO: NEGATIVE
NONHDLC SERPL-MCNC: 86 MG/DL
NRBC BLD-RTO: 0 %
PDW BLD AUTO: 12.5 FL (ref 9.4–12.3)
PH UR STRIP.AUTO: 7.5 [PH]
PLATELET # BLD AUTO: 354 K/UL (ref 150–369)
POTASSIUM SERPL-SCNC: 4.1 MEQ/L (ref 3.6–5.1)
PROT SERPL-MCNC: 6.6 G/DL (ref 6–8.2)
PROT UR QL STRIP.AUTO: ABNORMAL
RBC # BLD AUTO: 4.22 M/UL (ref 3.93–5.22)
RBC #/AREA URNS HPF: ABNORMAL /HPF
SODIUM SERPL-SCNC: 140 MEQ/L (ref 136–144)
SP GR UR REFRACT.AUTO: 1.02
SQUAMOUS URNS QL MICRO: 1 /HPF
TRIGL SERPL-MCNC: 51 MG/DL (ref 30–149)
TSH SERPL DL<=0.05 MIU/L-ACNC: 2.18 MIU/L (ref 0.34–5.6)
UROBILINOGEN UR STRIP-ACNC: 0.2 EU/DL
WBC # BLD AUTO: 6.39 K/UL (ref 3.8–10.5)
WBC #/AREA URNS HPF: ABNORMAL /HPF

## 2021-04-15 PROCEDURE — 86140 C-REACTIVE PROTEIN: CPT

## 2021-04-15 PROCEDURE — 85025 COMPLETE CBC W/AUTO DIFF WBC: CPT

## 2021-04-15 PROCEDURE — 80053 COMPREHEN METABOLIC PANEL: CPT

## 2021-04-15 PROCEDURE — 80061 LIPID PANEL: CPT

## 2021-04-15 PROCEDURE — 82550 ASSAY OF CK (CPK): CPT

## 2021-04-15 PROCEDURE — 36415 COLL VENOUS BLD VENIPUNCTURE: CPT

## 2021-04-15 PROCEDURE — 81001 URINALYSIS AUTO W/SCOPE: CPT

## 2021-04-15 PROCEDURE — 82570 ASSAY OF URINE CREATININE: CPT

## 2021-04-15 PROCEDURE — 83036 HEMOGLOBIN GLYCOSYLATED A1C: CPT

## 2021-04-15 PROCEDURE — 82306 VITAMIN D 25 HYDROXY: CPT

## 2021-04-15 PROCEDURE — 84443 ASSAY THYROID STIM HORMONE: CPT

## 2021-04-15 NOTE — TELEPHONE ENCOUNTER
Pt calling Miriam to report she had labs done today.   Pt also wants to know if she is to continue on Cholesterol medication   P#[phone]

## 2021-04-16 ENCOUNTER — TELEPHONE (OUTPATIENT)
Dept: CARDIOLOGY | Facility: CLINIC | Age: 56
End: 2021-04-16

## 2021-04-16 NOTE — TELEPHONE ENCOUNTER
Pt called stating that she was returning missed call from Miriam and that she had a few questions for her.    Pt can be reached at [phone]

## 2021-04-16 NOTE — TELEPHONE ENCOUNTER
I s/w pt and reviewed labs.  Stable.  I asked that she remain on Crestor 5 mg daily since she seems to be tolerating.

## 2021-05-13 RX ORDER — POTASSIUM CHLORIDE 750 MG/1
TABLET, EXTENDED RELEASE ORAL
Qty: 90 TABLET | Refills: 1 | Status: SHIPPED | OUTPATIENT
Start: 2021-05-13 | End: 2021-10-05 | Stop reason: SDUPTHER

## 2021-06-11 ENCOUNTER — TRANSCRIBE ORDERS (OUTPATIENT)
Dept: SCHEDULING | Age: 56
End: 2021-06-11

## 2021-06-11 DIAGNOSIS — D25.9 LEIOMYOMA OF UTERUS, UNSPECIFIED: Primary | ICD-10-CM

## 2021-06-17 ENCOUNTER — HOSPITAL ENCOUNTER (OUTPATIENT)
Dept: RADIOLOGY | Facility: CLINIC | Age: 56
Discharge: HOME | End: 2021-06-17
Attending: OBSTETRICS & GYNECOLOGY
Payer: MEDICARE

## 2021-06-17 DIAGNOSIS — D25.9 LEIOMYOMA OF UTERUS, UNSPECIFIED: ICD-10-CM

## 2021-06-17 PROCEDURE — 76856 US EXAM PELVIC COMPLETE: CPT

## 2021-07-01 ENCOUNTER — OFFICE VISIT (OUTPATIENT)
Dept: INTERNAL MEDICINE | Facility: CLINIC | Age: 56
End: 2021-07-01
Payer: MEDICARE

## 2021-07-01 VITALS
BODY MASS INDEX: 24.45 KG/M2 | HEIGHT: 57 IN | TEMPERATURE: 98.1 F | DIASTOLIC BLOOD PRESSURE: 76 MMHG | OXYGEN SATURATION: 99 % | SYSTOLIC BLOOD PRESSURE: 100 MMHG | HEART RATE: 71 BPM | RESPIRATION RATE: 16 BRPM

## 2021-07-01 DIAGNOSIS — E78.5 HYPERLIPIDEMIA, UNSPECIFIED HYPERLIPIDEMIA TYPE: ICD-10-CM

## 2021-07-01 DIAGNOSIS — M79.674 PAIN OF TOE OF RIGHT FOOT: Primary | ICD-10-CM

## 2021-07-01 DIAGNOSIS — R73.9 HYPERGLYCEMIA: ICD-10-CM

## 2021-07-01 DIAGNOSIS — G95.9 CERVICAL MYELOPATHY (CMS/HCC): ICD-10-CM

## 2021-07-01 DIAGNOSIS — R09.81 NASAL CONGESTION: ICD-10-CM

## 2021-07-01 DIAGNOSIS — E55.9 VITAMIN D DEFICIENCY: ICD-10-CM

## 2021-07-01 DIAGNOSIS — M81.0 OSTEOPOROSIS WITHOUT CURRENT PATHOLOGICAL FRACTURE, UNSPECIFIED OSTEOPOROSIS TYPE: ICD-10-CM

## 2021-07-01 PROCEDURE — 99214 OFFICE O/P EST MOD 30 MIN: CPT | Performed by: INTERNAL MEDICINE

## 2021-07-01 NOTE — PROGRESS NOTES
Subjective      Patient ID: Rajwinder Wynne is a 55 y.o. female     HPI     Has had pain in her right little toe for the last week,dropped a pan in the kitchen,is better since she taped it   Saw Dr Conti for her yearly visit  Is on Prolia in April and will be due in 6 months  Is due to see Dr Arnett  Has been taking the rosuvastatin 5 mg daily  Has nasal congestion and blockage due to her her deviated nasal septum  Saw Dr Boucher and had MRI pelvis that showed that the fibroids are decreased in size      Past Medical History:   Diagnosis Date   • Allergic rhinitis    • Cerebral cavernoma     parietal cavernoma   • Cervical myelopathy (CMS/HCC)    • Cervical radiculopathy    • Cervicogenic headache    • Closed fracture of sesamoid bone of foot    • Cricopharyngeal dysphagia    • Fibroids    • Food allergy    • Fractured toe    • GERD (gastroesophageal reflux disease)    • Lipid disorder    • Nephrolithiasis    • Osteoporosis        Past Surgical History:   Procedure Laterality Date   • ANTERIOR FUSION CERVICAL SPINE      c6-7   • BUNIONECTOMY     • CRICOPHARYNGEAL MYOTOMY     • KNEE SURGERY     • LUMBAR FUSION      L5-S1   • MYOMECTOMY     • THROAT SURGERY     • TIBIAL SESAMOID EXCISION         Family History   Problem Relation Age of Onset   • Hyperlipidemia Biological Mother    • Hypertension Biological Mother    • Pulmonary fibrosis Biological Father    • Heart attack Biological Father    • Colon cancer Maternal Grandfather    • Breast cancer Mother's Sister    • Ovarian cancer Father's Sister        Social History     Socioeconomic History   • Marital status:      Spouse name: Not on file   • Number of children: Not on file   • Years of education: Not on file   • Highest education level: Not on file   Occupational History   • Not on file   Tobacco Use   • Smoking status: Never Smoker   • Smokeless tobacco: Never Used   Vaping Use   • Vaping Use: Never used   Substance and Sexual Activity   • Alcohol use:  No   • Drug use: No   • Sexual activity: Not on file   Other Topics Concern   • Not on file   Social History Narrative   • Not on file     Social Determinants of Health     Financial Resource Strain:    • Difficulty of Paying Living Expenses:    Food Insecurity:    • Worried About Running Out of Food in the Last Year:    • Ran Out of Food in the Last Year:    Transportation Needs:    • Lack of Transportation (Medical):    • Lack of Transportation (Non-Medical):    Physical Activity:    • Days of Exercise per Week:    • Minutes of Exercise per Session:    Stress:    • Feeling of Stress :    Social Connections:    • Frequency of Communication with Friends and Family:    • Frequency of Social Gatherings with Friends and Family:    • Attends Spiritism Services:    • Active Member of Clubs or Organizations:    • Attends Club or Organization Meetings:    • Marital Status:    Intimate Partner Violence:    • Fear of Current or Ex-Partner:    • Emotionally Abused:    • Physically Abused:    • Sexually Abused:        Allergies   Allergen Reactions   • Liriano Flavor Other (see comments)   • Shellfish Containing Products Anaphylaxis     Pumpkin seeds   • Adhesive      Steri strips   • [place] Other (see comments)   • Banana Other (see comments)     also allergic to almonds, berries   • Erythromycin    • Erythromycin Base Other (see comments)     GI Upset  Other reaction(s): GI side effects   • Iodine Other (see comments)     shell fish allergy   • Latex Other (see comments)     patient not sure   • Levonorgestrel-Ethinyl Estrad    • Naprelan    • Naproxen Other (see comments)     dysphagia throat burning   • Naproxen Sodium      Other reaction(s): lump in throat   • Penicillin G    • Penicillin V Potassium Hives   • Penicillins Hives   • Pineapple Other (see comments)   • Pumpkin Seed      Other reaction(s): throat gets tight   • Shellfish Derived Hives       Current Outpatient Medications   Medication Sig Dispense Refill   •  "cetirizine (ZyrTEC) 10 mg tablet Take 1 tablet by mouth daily.     • cholecalciferol, vitamin D3, (VITAMIN D3) 1,000 unit capsule take 1 tablet by oral route  every day     • denosumab (PROLIA) 60 mg/mL syringe Inject 60 mg under the skin once.     • fluticasone propionate (FLONASE) 50 mcg/actuation nasal spray SPRAY 2 SPRAYS INTO EACH NOSTRIL EVERY DAY 48 mL 1   • folic acid/multivit,iron, (CENTRUM ORAL) Take by mouth.     • gabapentin (NEURONTIN) 300 mg capsule Take 900 mg by mouth 3 (three) times a day.       • hydrochlorothiazide (HYDRODIURIL) 25 mg tablet TAKE 1/2 TABLET BY MOUTH EVERY DAY 45 tablet 3   • KLOR-CON M10 10 mEq CR tablet TAKE 1 TABLET BY MOUTH EVERY DAY 90 tablet 1   • meloxicam (MOBIC) 7.5 mg tablet Take 7.5 mg by mouth as needed.       • pantoprazole (PROTONIX) 40 mg EC tablet TAKE 1 TABLET BY MOUTH EVERY DAY (Patient taking differently: Take by mouth as needed.  ) 90 tablet 1   • rosuvastatin (CRESTOR) 5 mg tablet TAKE 1 TABLET BY MOUTH EVERY DAY 90 tablet 1   • b complex vitamins tablet Take 400 mg by mouth daily.       No current facility-administered medications for this visit.       Visit Vitals  /76   Pulse 71   Temp 36.7 °C (98.1 °F) (Oral)   Resp 16   Ht 1.448 m (4' 9\")   SpO2 99%   BMI 24.45 kg/m²         The following have been reviewed and updated as appropriate in this visit:  Allergies  Meds  Problems       Review of Systems   Constitutional: Negative for chills, fatigue and fever.   HENT: Negative for ear pain and sore throat.         Nasal congestion   Eyes: Negative for pain and visual disturbance.   Respiratory: Negative for cough and shortness of breath.    Cardiovascular: Negative for chest pain, palpitations and leg swelling.   Gastrointestinal: Negative for abdominal pain, blood in stool, diarrhea, nausea and vomiting.   Genitourinary: Negative for dysuria and hematuria.   Musculoskeletal: Negative for back pain.        Toe pain   Skin: Negative for rash. "   Allergic/Immunologic: Negative for environmental allergies.   Neurological: Negative for dizziness, numbness and headaches.   Hematological: Does not bruise/bleed easily.   Psychiatric/Behavioral: Negative for confusion.       Objective       Physical Exam  Vitals and nursing note reviewed.   Constitutional:       Appearance: She is well-developed.   HENT:      Head: Normocephalic and atraumatic.      Right Ear: External ear normal.      Left Ear: External ear normal.      Nose: Nose normal.   Eyes:      Conjunctiva/sclera: Conjunctivae normal.      Pupils: Pupils are equal, round, and reactive to light.   Neck:      Thyroid: No thyromegaly.   Cardiovascular:      Rate and Rhythm: Normal rate and regular rhythm.      Heart sounds: Normal heart sounds.   Pulmonary:      Effort: Pulmonary effort is normal. No respiratory distress.      Breath sounds: Normal breath sounds.   Chest:      Chest wall: No tenderness.   Abdominal:      General: Bowel sounds are normal.      Palpations: Abdomen is soft.      Tenderness: There is no abdominal tenderness. There is no guarding.   Musculoskeletal:         General: No tenderness. Normal range of motion.      Cervical back: Normal range of motion and neck supple.      Comments: Right fifth toe mildly swollen,minimal tenderness   Lymphadenopathy:      Cervical: No cervical adenopathy.   Skin:     General: Skin is warm and dry.      Findings: No rash.   Neurological:      Mental Status: She is alert and oriented to person, place, and time.      Cranial Nerves: No cranial nerve deficit.      Sensory: No sensory deficit.   Psychiatric:         Behavior: Behavior normal.         Assessment/Plan     Pain of toe of right foot  likely contusion  advsied her to continue to tape the toes together and use warm soaks  Follow up if sx persist    Hyperlipidemia  Fair control  Continue rosuvastatin 5 mg daily  Labs ordered    Cervical myelopathy (CMS/HCC)  Persisting symptoms  She will  continue gabapentin 600 mg three times daily  Follow up with neurology Dr Arnett  She sees him in July yearly    Vitamin D deficiency  Fair control  Cont vitamin D3,1000 units daily  Check labs    Osteoporosis without current pathological fracture  Fair control  She is on prolia every 6 months and sees dr Crow  Discussed calcium and vitamin D supplementation    Nasal congestion  Worsened by deviated nasal septum  Use  Flonase nasal spray  May need to see ENT if persistent        Maya Tesfaye MD  7/2/2021  7:20 AM

## 2021-07-01 NOTE — PATIENT INSTRUCTIONS
PLAN    Tape the toe  Use flonase nasal spray daily  Cont the meds  Shingles vaccine  See me in 6 months  YULISA

## 2021-07-02 PROBLEM — R09.81 NASAL CONGESTION: Status: ACTIVE | Noted: 2021-07-02

## 2021-07-02 PROBLEM — M79.674 PAIN OF TOE OF RIGHT FOOT: Status: ACTIVE | Noted: 2021-07-02

## 2021-07-02 ASSESSMENT — ENCOUNTER SYMPTOMS
NAUSEA: 0
HEMATURIA: 0
BRUISES/BLEEDS EASILY: 0
EYE PAIN: 0
PALPITATIONS: 0
COUGH: 0
HEADACHES: 0
SORE THROAT: 0
NUMBNESS: 0
ABDOMINAL PAIN: 0
CHILLS: 0
DYSURIA: 0
CONFUSION: 0
FATIGUE: 0
SHORTNESS OF BREATH: 0
VOMITING: 0
DIZZINESS: 0
DIARRHEA: 0
FEVER: 0
BACK PAIN: 0
BLOOD IN STOOL: 0

## 2021-07-02 NOTE — ASSESSMENT & PLAN NOTE
likely contusion  advsied her to continue to tape the toes together and use warm soaks  Follow up if sx persist

## 2021-07-02 NOTE — ASSESSMENT & PLAN NOTE
Fair control  She is on prolia every 6 months and sees dr Crow  Discussed calcium and vitamin D supplementation

## 2021-09-14 ENCOUNTER — OFFICE VISIT (OUTPATIENT)
Dept: INTERNAL MEDICINE | Facility: CLINIC | Age: 56
End: 2021-09-14
Payer: MEDICARE

## 2021-09-14 VITALS
HEIGHT: 57 IN | BODY MASS INDEX: 24.16 KG/M2 | HEART RATE: 80 BPM | RESPIRATION RATE: 20 BRPM | OXYGEN SATURATION: 98 % | WEIGHT: 112 LBS | SYSTOLIC BLOOD PRESSURE: 126 MMHG | DIASTOLIC BLOOD PRESSURE: 84 MMHG

## 2021-09-14 DIAGNOSIS — J32.9 SINUSITIS, UNSPECIFIED CHRONICITY, UNSPECIFIED LOCATION: ICD-10-CM

## 2021-09-14 DIAGNOSIS — H93.8X9 EAR CONGESTION, UNSPECIFIED LATERALITY: Primary | ICD-10-CM

## 2021-09-14 DIAGNOSIS — E78.5 HYPERLIPIDEMIA, UNSPECIFIED HYPERLIPIDEMIA TYPE: ICD-10-CM

## 2021-09-14 PROCEDURE — 99214 OFFICE O/P EST MOD 30 MIN: CPT | Performed by: INTERNAL MEDICINE

## 2021-09-14 RX ORDER — CIPROFLOXACIN AND DEXAMETHASONE 3; 1 MG/ML; MG/ML
4 SUSPENSION/ DROPS AURICULAR (OTIC) 2 TIMES DAILY
Qty: 7.5 ML | Refills: 0 | Status: SHIPPED | OUTPATIENT
Start: 2021-09-14 | End: 2021-09-21

## 2021-09-14 ASSESSMENT — ENCOUNTER SYMPTOMS
DYSURIA: 0
SORE THROAT: 0
FEVER: 0
PALPITATIONS: 0
CHILLS: 0
DIZZINESS: 0
ABDOMINAL PAIN: 0
BRUISES/BLEEDS EASILY: 0
BLOOD IN STOOL: 0
BACK PAIN: 0
HEMATURIA: 0
NUMBNESS: 0
SHORTNESS OF BREATH: 0
VOMITING: 0
HEADACHES: 0
CONFUSION: 0
COUGH: 0
EYE PAIN: 0
NAUSEA: 0
FATIGUE: 0
DIARRHEA: 0

## 2021-09-14 ASSESSMENT — PATIENT HEALTH QUESTIONNAIRE - PHQ9: SUM OF ALL RESPONSES TO PHQ9 QUESTIONS 1 & 2: 0

## 2021-09-14 NOTE — PATIENT INSTRUCTIONS
PLAN    Start the flonase nasal spray daily for 4 weeks  Cont the zyrtec daily  Start ciprodex twice daily for 7 days  Drink lots of fluids

## 2021-09-14 NOTE — PROGRESS NOTES
Subjective      Patient ID: Rajwinder Wynne is a 55 y.o. female     HPI       Here for follow up on ongoing issue'  she was sick 10 days ago with cold sx,post nasal drip and feeling tired  Had COVID test was negative  She still has ear congestion left more than right  Ear feels inflamed and congested  Is on zyrtec daily  No cough  Also is concerned about her mothers slow decline   She is now 81 yrs old      Past Medical History:   Diagnosis Date   • Allergic rhinitis    • Cerebral cavernoma     parietal cavernoma   • Cervical myelopathy (CMS/HCC)    • Cervical radiculopathy    • Cervicogenic headache    • Closed fracture of sesamoid bone of foot    • Cricopharyngeal dysphagia    • Fibroids    • Food allergy    • Fractured toe    • GERD (gastroesophageal reflux disease)    • Lipid disorder    • Nephrolithiasis    • Osteoporosis        Past Surgical History:   Procedure Laterality Date   • ANTERIOR FUSION CERVICAL SPINE      c6-7   • BUNIONECTOMY     • CRICOPHARYNGEAL MYOTOMY     • KNEE SURGERY     • LUMBAR FUSION      L5-S1   • MYOMECTOMY     • THROAT SURGERY     • TIBIAL SESAMOID EXCISION         Family History   Problem Relation Age of Onset   • Hyperlipidemia Biological Mother    • Hypertension Biological Mother    • Pulmonary fibrosis Biological Father    • Heart attack Biological Father    • Colon cancer Maternal Grandfather    • Breast cancer Mother's Sister    • Ovarian cancer Father's Sister        Social History     Socioeconomic History   • Marital status:      Spouse name: Not on file   • Number of children: Not on file   • Years of education: Not on file   • Highest education level: Not on file   Occupational History   • Not on file   Tobacco Use   • Smoking status: Never Smoker   • Smokeless tobacco: Never Used   Vaping Use   • Vaping Use: Never used   Substance and Sexual Activity   • Alcohol use: No   • Drug use: No   • Sexual activity: Not on file   Other Topics Concern   • Not on file    Social History Narrative   • Not on file     Social Determinants of Health     Financial Resource Strain:    • Difficulty of Paying Living Expenses:    Food Insecurity:    • Worried About Running Out of Food in the Last Year:    • Ran Out of Food in the Last Year:    Transportation Needs:    • Lack of Transportation (Medical):    • Lack of Transportation (Non-Medical):    Physical Activity:    • Days of Exercise per Week:    • Minutes of Exercise per Session:    Stress:    • Feeling of Stress :    Social Connections:    • Frequency of Communication with Friends and Family:    • Frequency of Social Gatherings with Friends and Family:    • Attends Buddhist Services:    • Active Member of Clubs or Organizations:    • Attends Club or Organization Meetings:    • Marital Status:    Intimate Partner Violence:    • Fear of Current or Ex-Partner:    • Emotionally Abused:    • Physically Abused:    • Sexually Abused:        Allergies   Allergen Reactions   • Liriano Flavor Other (see comments)   • Shellfish Containing Products Anaphylaxis     Pumpkin seeds   • Adhesive      Steri strips   • [place] Other (see comments)   • Banana Other (see comments)     also allergic to almonds, berries   • Erythromycin    • Erythromycin Base Other (see comments)     GI Upset  Other reaction(s): GI side effects   • Iodine Other (see comments)     shell fish allergy   • Latex Other (see comments)     patient not sure   • Levonorgestrel-Ethinyl Estrad    • Naprelan    • Naproxen Other (see comments)     dysphagia throat burning   • Naproxen Sodium      Other reaction(s): lump in throat   • Penicillin G    • Penicillin V Potassium Hives   • Penicillins Hives   • Pineapple Other (see comments)   • Pumpkin Seed      Other reaction(s): throat gets tight   • Shellfish Derived Hives       Current Outpatient Medications   Medication Sig Dispense Refill   • b complex vitamins tablet Take 400 mg by mouth daily.     • cetirizine (ZyrTEC) 10 mg tablet  "Take 1 tablet by mouth daily.     • cholecalciferol, vitamin D3, (VITAMIN D3) 1,000 unit capsule take 1 tablet by oral route  every day     • denosumab (PROLIA) 60 mg/mL syringe Inject 60 mg under the skin once.     • fluticasone propionate (FLONASE) 50 mcg/actuation nasal spray SPRAY 2 SPRAYS INTO EACH NOSTRIL EVERY DAY 48 mL 1   • folic acid/multivit,iron, (CENTRUM ORAL) Take by mouth.     • gabapentin (NEURONTIN) 300 mg capsule Take 900 mg by mouth 3 (three) times a day.       • hydrochlorothiazide (HYDRODIURIL) 25 mg tablet TAKE 1/2 TABLET BY MOUTH EVERY DAY 45 tablet 3   • KLOR-CON M10 10 mEq CR tablet TAKE 1 TABLET BY MOUTH EVERY DAY 90 tablet 1   • meloxicam (MOBIC) 7.5 mg tablet Take 7.5 mg by mouth as needed.       • pantoprazole (PROTONIX) 40 mg EC tablet TAKE 1 TABLET BY MOUTH EVERY DAY (Patient taking differently: Take by mouth as needed.  ) 90 tablet 1   • rosuvastatin (CRESTOR) 5 mg tablet TAKE 1 TABLET BY MOUTH EVERY DAY 90 tablet 1   • ciprofloxacin-dexamethasone (CIPRODEX) 0.3-0.1 % otic suspension Administer 4 drops into each ear 2 (two) times a day for 7 days. 7.5 mL 0     No current facility-administered medications for this visit.       Visit Vitals  /84   Pulse 80   Resp 20   Ht 1.448 m (4' 9\")   Wt 50.8 kg (112 lb)   SpO2 98%   BMI 24.24 kg/m²         The following have been reviewed and updated as appropriate in this visit:  Allergies  Meds  Problems       Review of Systems   Constitutional: Negative for chills, fatigue and fever.   HENT: Positive for congestion, ear pain and postnasal drip. Negative for sore throat.    Eyes: Negative for pain and visual disturbance.   Respiratory: Negative for cough and shortness of breath.    Cardiovascular: Negative for chest pain, palpitations and leg swelling.   Gastrointestinal: Negative for abdominal pain, blood in stool, diarrhea, nausea and vomiting.   Genitourinary: Negative for dysuria and hematuria.   Musculoskeletal: Negative for " back pain.   Skin: Negative for rash.   Allergic/Immunologic: Negative for environmental allergies.   Neurological: Negative for dizziness, numbness and headaches.   Hematological: Does not bruise/bleed easily.   Psychiatric/Behavioral: Negative for confusion.       Objective       Physical Exam  Vitals and nursing note reviewed.   Constitutional:       Appearance: She is well-developed.   HENT:      Head: Normocephalic and atraumatic.      Right Ear: External ear normal.      Left Ear: External ear normal.      Ears:      Comments: inflammed external canal r>l     Nose: Nose normal.      Comments: Nasal turbinates congested and swollen,right more than left  DNS     Mouth/Throat:      Mouth: Mucous membranes are dry.   Eyes:      Conjunctiva/sclera: Conjunctivae normal.      Pupils: Pupils are equal, round, and reactive to light.   Neck:      Thyroid: No thyromegaly.   Cardiovascular:      Rate and Rhythm: Normal rate and regular rhythm.      Heart sounds: Normal heart sounds.   Pulmonary:      Effort: Pulmonary effort is normal. No respiratory distress.      Breath sounds: Normal breath sounds.   Chest:      Chest wall: No tenderness.   Abdominal:      General: Bowel sounds are normal.      Palpations: Abdomen is soft.      Tenderness: There is no abdominal tenderness. There is no guarding.   Musculoskeletal:         General: No tenderness. Normal range of motion.      Cervical back: Normal range of motion and neck supple.      Comments: Tightness of neck muscles   Lymphadenopathy:      Cervical: No cervical adenopathy.   Skin:     General: Skin is warm and dry.      Findings: No rash.   Neurological:      Mental Status: She is alert and oriented to person, place, and time.      Cranial Nerves: No cranial nerve deficit.      Sensory: No sensory deficit.   Psychiatric:         Behavior: Behavior normal.         Assessment/Plan     Ear congestion  External otitis in both ears  Start ciprodex 4 drops twice daily for 7  days  No qtips  Drink lots of fluids  Follow up if not better    Sinusitis  Persisting symptoms  Start flonase nasal spray ,2 sprays daily for 4 weeks  Continue zyrtec daily  Drink lots of lfuids'  Follow up if sx persist    Hyperlipidemia  Fair control  Continue rosuvastatin 5 mg daily  Labs were ordered        Maya Tesfaye MD  9/14/2021  6:59 PM

## 2021-09-14 NOTE — ASSESSMENT & PLAN NOTE
External otitis in both ears  Start ciprodex 4 drops twice daily for 7 days  No qtips  Drink lots of fluids  Follow up if not better

## 2021-09-14 NOTE — ASSESSMENT & PLAN NOTE
Persisting symptoms  Start flonase nasal spray ,2 sprays daily for 4 weeks  Continue zyrtec daily  Drink lots of lfuids'  Follow up if sx persist

## 2021-10-05 RX ORDER — POTASSIUM CHLORIDE 750 MG/1
10 TABLET, EXTENDED RELEASE ORAL
Qty: 90 TABLET | Refills: 1 | Status: SHIPPED | OUTPATIENT
Start: 2021-10-05 | End: 2021-12-22 | Stop reason: SDUPTHER

## 2021-10-05 RX ORDER — PANTOPRAZOLE SODIUM 40 MG/1
40 TABLET, DELAYED RELEASE ORAL
Qty: 90 TABLET | Refills: 1 | Status: SHIPPED | OUTPATIENT
Start: 2021-10-05 | End: 2021-12-27 | Stop reason: SDUPTHER

## 2021-10-07 RX ORDER — ROSUVASTATIN CALCIUM 5 MG/1
5 TABLET, COATED ORAL
Qty: 90 TABLET | Refills: 1 | Status: SHIPPED | OUTPATIENT
Start: 2021-10-07 | End: 2021-12-27 | Stop reason: SDUPTHER

## 2021-10-15 ENCOUNTER — APPOINTMENT (OUTPATIENT)
Dept: LAB | Facility: CLINIC | Age: 56
End: 2021-10-15
Attending: INTERNAL MEDICINE
Payer: MEDICARE

## 2021-10-15 DIAGNOSIS — E55.9 VITAMIN D DEFICIENCY: ICD-10-CM

## 2021-10-15 DIAGNOSIS — G95.9 CERVICAL MYELOPATHY (CMS/HCC): ICD-10-CM

## 2021-10-15 DIAGNOSIS — E78.5 HYPERLIPIDEMIA, UNSPECIFIED HYPERLIPIDEMIA TYPE: ICD-10-CM

## 2021-10-15 DIAGNOSIS — R73.9 HYPERGLYCEMIA: ICD-10-CM

## 2021-10-15 LAB
25(OH)D3 SERPL-MCNC: 46 NG/ML (ref 30–100)
ALBUMIN SERPL-MCNC: 3.8 G/DL (ref 3.4–5)
ALP SERPL-CCNC: 64 IU/L (ref 35–126)
ALT SERPL-CCNC: 24 IU/L (ref 11–54)
ANION GAP SERPL CALC-SCNC: 10 MEQ/L (ref 3–15)
AST SERPL-CCNC: 26 IU/L (ref 15–41)
BASOPHILS # BLD: 0.08 K/UL (ref 0.01–0.1)
BASOPHILS NFR BLD: 1.2 %
BILIRUB SERPL-MCNC: 0.3 MG/DL (ref 0.3–1.2)
BUN SERPL-MCNC: 19 MG/DL (ref 8–20)
CALCIUM SERPL-MCNC: 9.8 MG/DL (ref 8.9–10.3)
CHLORIDE SERPL-SCNC: 102 MEQ/L (ref 98–109)
CHOLEST SERPL-MCNC: 185 MG/DL
CO2 SERPL-SCNC: 27 MEQ/L (ref 22–32)
CREAT SERPL-MCNC: 0.7 MG/DL (ref 0.6–1.1)
CRP SERPL-MCNC: <6 MG/L
DIFFERENTIAL METHOD BLD: ABNORMAL
EOSINOPHIL # BLD: 0.38 K/UL (ref 0.04–0.36)
EOSINOPHIL NFR BLD: 5.8 %
ERYTHROCYTE [DISTWIDTH] IN BLOOD BY AUTOMATED COUNT: 13.7 % (ref 11.7–14.4)
EST. AVERAGE GLUCOSE BLD GHB EST-MCNC: 108 MG/DL
GFR SERPL CREATININE-BSD FRML MDRD: >60 ML/MIN/1.73M*2
GLUCOSE SERPL-MCNC: 92 MG/DL (ref 70–99)
HBA1C MFR BLD HPLC: 5.4 %
HCT VFR BLDCO AUTO: 42.7 % (ref 35–45)
HDLC SERPL-MCNC: 69 MG/DL
HDLC SERPL: 2.7 {RATIO}
HGB BLD-MCNC: 14.2 G/DL (ref 11.8–15.7)
IMM GRANULOCYTES # BLD AUTO: 0.02 K/UL (ref 0–0.08)
IMM GRANULOCYTES NFR BLD AUTO: 0.3 %
LDLC SERPL CALC-MCNC: 102 MG/DL
LYMPHOCYTES # BLD: 3.22 K/UL (ref 1.2–3.5)
LYMPHOCYTES NFR BLD: 48.9 %
MCH RBC QN AUTO: 33.3 PG (ref 28–33.2)
MCHC RBC AUTO-ENTMCNC: 33.3 G/DL (ref 32.2–35.5)
MCV RBC AUTO: 100 FL (ref 83–98)
MONOCYTES # BLD: 0.5 K/UL (ref 0.28–0.8)
MONOCYTES NFR BLD: 7.6 %
NEUTROPHILS # BLD: 2.39 K/UL (ref 1.7–7)
NEUTS SEG NFR BLD: 36.2 %
NONHDLC SERPL-MCNC: 116 MG/DL
NRBC BLD-RTO: 0 %
PDW BLD AUTO: 12.3 FL (ref 9.4–12.3)
PLATELET # BLD AUTO: 361 K/UL (ref 150–369)
POTASSIUM SERPL-SCNC: 4.4 MEQ/L (ref 3.6–5.1)
PROT SERPL-MCNC: 5.9 G/DL (ref 6–8.2)
RBC # BLD AUTO: 4.27 M/UL (ref 3.93–5.22)
SODIUM SERPL-SCNC: 139 MEQ/L (ref 136–144)
TRIGL SERPL-MCNC: 69 MG/DL (ref 30–149)
TSH SERPL DL<=0.05 MIU/L-ACNC: 2.02 MIU/L (ref 0.34–5.6)
WBC # BLD AUTO: 6.59 K/UL (ref 3.8–10.5)

## 2021-10-15 PROCEDURE — 82306 VITAMIN D 25 HYDROXY: CPT

## 2021-10-15 PROCEDURE — 80061 LIPID PANEL: CPT

## 2021-10-15 PROCEDURE — 86140 C-REACTIVE PROTEIN: CPT

## 2021-10-15 PROCEDURE — 84443 ASSAY THYROID STIM HORMONE: CPT

## 2021-10-15 PROCEDURE — 83036 HEMOGLOBIN GLYCOSYLATED A1C: CPT

## 2021-10-15 PROCEDURE — 36415 COLL VENOUS BLD VENIPUNCTURE: CPT

## 2021-10-15 PROCEDURE — 80053 COMPREHEN METABOLIC PANEL: CPT

## 2021-10-15 PROCEDURE — 85025 COMPLETE CBC W/AUTO DIFF WBC: CPT

## 2021-10-22 ENCOUNTER — OFFICE VISIT (OUTPATIENT)
Dept: INTERNAL MEDICINE | Facility: CLINIC | Age: 56
End: 2021-10-22
Payer: MEDICARE

## 2021-10-22 VITALS
WEIGHT: 111 LBS | HEART RATE: 75 BPM | DIASTOLIC BLOOD PRESSURE: 78 MMHG | TEMPERATURE: 97.6 F | SYSTOLIC BLOOD PRESSURE: 118 MMHG | RESPIRATION RATE: 12 BRPM | HEIGHT: 57 IN | OXYGEN SATURATION: 97 % | BODY MASS INDEX: 23.95 KG/M2

## 2021-10-22 DIAGNOSIS — M81.0 OSTEOPOROSIS WITHOUT CURRENT PATHOLOGICAL FRACTURE, UNSPECIFIED OSTEOPOROSIS TYPE: ICD-10-CM

## 2021-10-22 DIAGNOSIS — E78.5 HYPERLIPIDEMIA, UNSPECIFIED HYPERLIPIDEMIA TYPE: ICD-10-CM

## 2021-10-22 DIAGNOSIS — Z00.00 MEDICARE ANNUAL WELLNESS VISIT, SUBSEQUENT: Primary | ICD-10-CM

## 2021-10-22 DIAGNOSIS — G95.9 CERVICAL MYELOPATHY (CMS/HCC): ICD-10-CM

## 2021-10-22 DIAGNOSIS — E55.9 VITAMIN D DEFICIENCY: ICD-10-CM

## 2021-10-22 PROCEDURE — G0439 PPPS, SUBSEQ VISIT: HCPCS | Performed by: INTERNAL MEDICINE

## 2021-10-22 PROCEDURE — 90686 IIV4 VACC NO PRSV 0.5 ML IM: CPT | Performed by: INTERNAL MEDICINE

## 2021-10-22 PROCEDURE — G0008 ADMIN INFLUENZA VIRUS VAC: HCPCS | Performed by: INTERNAL MEDICINE

## 2021-10-22 RX ORDER — FLUTICASONE PROPIONATE 50 MCG
1 SPRAY, SUSPENSION (ML) NASAL DAILY
Qty: 48 ML | Refills: 1 | Status: SHIPPED | OUTPATIENT
Start: 2021-10-22 | End: 2021-12-22 | Stop reason: SDUPTHER

## 2021-10-22 ASSESSMENT — MINI COG
TOTAL SCORE: 5
COMPLETED: YES

## 2021-10-22 ASSESSMENT — VISUAL ACUITY
OD_CC: 20/25
OS_CC: 20/25

## 2021-10-22 ASSESSMENT — PATIENT HEALTH QUESTIONNAIRE - PHQ9: SUM OF ALL RESPONSES TO PHQ9 QUESTIONS 1 & 2: 0

## 2021-10-22 NOTE — PROGRESS NOTES
Luis Eduardo Wynne is a 55 y.o. female who presents for a subsequent annual wellness visit.     Patient Care Team:  Maya Tesfaye MD as PCP - General  Dr Citlaly Sung      Here for wellness exam and review of chronic conditions  Review test results  Current on gyn exam  Had prolia injection yesterday  Is due to see Dr Trujillo at [place] for her history of cerebral cavernoma      Comprehensive Medical and Social History  Patient Active Problem List   Diagnosis   • Cricopharyngeal dysphagia   • Hyperglycemia   • Vitamin D deficiency   • Medicare annual wellness visit, subsequent   • Osteoporosis without current pathological fracture   • Elevated blood pressure reading in office without diagnosis of hypertension   • Cerebral cavernoma   • Otalgia of left ear   • Acute sinusitis   • Hyperlipidemia   • Cervical myelopathy (CMS/HCC)   • Allergy to shellfish   • History of URI (upper respiratory infection)   • Pain in both knees   • Left foot pain   • Allergy history, drug   • Cervicogenic headache   • Chronic pain syndrome   • Chronic post-traumatic headache   • Dizziness and giddiness   • Food allergy   • Hoarseness   • Nephrolithiasis   • Leiomyoma of uterus   • Neck pain   • Papanicolaou smear of cervix with low grade squamous intraepithelial lesion (LGSIL)   • Spinal cord disorder (CMS/HCC)   • Mouth sores   • Family history of atherosclerosis   • Pain of toe of right foot   • Nasal congestion   • Ear congestion   • Sinusitis     Past Medical History:   Diagnosis Date   • Allergic rhinitis    • Cerebral cavernoma     parietal cavernoma   • Cervical myelopathy (CMS/HCC)    • Cervical radiculopathy    • Cervicogenic headache    • Closed fracture of sesamoid bone of foot    • Cricopharyngeal dysphagia    • Fibroids    • Food allergy    • Fractured toe    • GERD (gastroesophageal reflux disease)    • Lipid disorder    • Nephrolithiasis    • Osteoporosis      Past Surgical History:    Procedure Laterality Date   • ANTERIOR FUSION CERVICAL SPINE      c6-7   • BUNIONECTOMY     • CRICOPHARYNGEAL MYOTOMY     • KNEE SURGERY     • LUMBAR FUSION      L5-S1   • MYOMECTOMY     • THROAT SURGERY     • TIBIAL SESAMOID EXCISION       Allergies   Allergen Reactions   • Liriano Flavor Other (see comments)   • Shellfish Containing Products Anaphylaxis     Pumpkin seeds   • Adhesive      Steri strips   • [place] Other (see comments)   • Banana Other (see comments)     also allergic to almonds, berries   • Erythromycin    • Erythromycin Base Other (see comments)     GI Upset  Other reaction(s): GI side effects   • Iodine Other (see comments)     shell fish allergy   • Latex Other (see comments)     patient not sure   • Levonorgestrel-Ethinyl Estrad    • Naprelan    • Naproxen Other (see comments)     dysphagia throat burning   • Naproxen Sodium      Other reaction(s): lump in throat   • Penicillin G    • Penicillin V Potassium Hives   • Penicillins Hives   • Pineapple Other (see comments)   • Pumpkin Seed      Other reaction(s): throat gets tight   • Shellfish Derived Hives     Current Outpatient Medications   Medication Sig Dispense Refill   • b complex vitamins tablet Take 400 mg by mouth daily.     • cetirizine (ZyrTEC) 10 mg tablet Take 1 tablet by mouth daily.     • cholecalciferol, vitamin D3, (VITAMIN D3) 1,000 unit capsule take 1 tablet by oral route  every day     • denosumab (PROLIA) 60 mg/mL syringe Inject 60 mg under the skin once.     • fluticasone propionate 50 mcg/actuation nasal spray Administer 1 spray into each nostril daily. 48 mL 1   • folic acid/multivit,iron, (CENTRUM ORAL) Take by mouth.     • gabapentin (NEURONTIN) 300 mg capsule Take 900 mg by mouth 3 (three) times a day.       • hydrochlorothiazide (HYDRODIURIL) 25 mg tablet TAKE 1/2 TABLET BY MOUTH EVERY DAY 45 tablet 3   • meloxicam (MOBIC) 7.5 mg tablet Take 7.5 mg by mouth as needed.       • pantoprazole (PROTONIX) 40 mg EC tablet  "Take 1 tablet (40 mg total) by mouth once daily. 90 tablet 1   • potassium chloride (KLOR-CON M10) 10 mEq CR tablet Take 1 tablet (10 mEq total) by mouth once daily. 90 tablet 1   • rosuvastatin (CRESTOR) 5 mg tablet Take 1 tablet (5 mg total) by mouth once daily. 90 tablet 1     No current facility-administered medications for this visit.     Social History     Tobacco Use   • Smoking status: Never Smoker   • Smokeless tobacco: Never Used   Vaping Use   • Vaping Use: Never used   Substance Use Topics   • Alcohol use: No   • Drug use: No     Family History   Problem Relation Age of Onset   • Hyperlipidemia Biological Mother    • Hypertension Biological Mother    • Pulmonary fibrosis Biological Father    • Heart attack Biological Father    • Colon cancer Maternal Grandfather    • Breast cancer Mother's Sister    • Ovarian cancer Father's Sister        Objective   Vitals  Vitals:    10/22/21 1356   BP: 118/78   Pulse: 75   Resp: 12   Temp: 36.4 °C (97.6 °F)   TempSrc: Temporal   SpO2: 97%   Weight: 50.3 kg (111 lb)   Height: 1.448 m (4' 9\")     Body mass index is 24.02 kg/m².    Advanced Care Plan  Does patient have advance directive?: Yes           Does patient have current OOH DNR form?: Yes           Does patient have current POLST?: Yes             PHQ  Will the patient answer the depression questions?: Yes   Little interest or pleasure in doing things: Not at all   Feeling down, depressed, or hopeless: Not at all   Depression Risk: 0                                             Mini Cog  Completed: Yes  Score: 5  Result: Negative      Get Up and Go  Result: Pass    STEADI Falls Risk  One or more falls in the last year: No           Has trouble stepping up onto a curb: No   Advised to use a cane or walker to get around safely: No   Often has to rush to the toilet: No   Feels unsteady when walking: No   Has lost some feeling in feet: No   Often feels sad or depressed: No   Steadies self on furniture while walking at " home: No   Takes medication that makes him/her feel lightheaded or more tired than usual: No   Worried about falling: No   Takes medicine to sleep or improve mood: No   Needs to push with hands when rising from a chair: No   Falls screen completed: Yes     Hearing and Vision Screening   Hearing Screening    125Hz 250Hz 500Hz 1000Hz 2000Hz 3000Hz 4000Hz 6000Hz 8000Hz   Right ear:            Left ear:               Visual Acuity Screening    Right eye Left eye Both eyes   Without correction:      With correction: 20/25 20/25 20/20   Comments: Under the care of eye doctor.     See HRA for relevant hearing screening response.    Assessment/Plan   Diagnoses and all orders for this visit:    Medicare annual wellness visit, subsequent (Primary)  Assessment & Plan:  Health maintenance measures reviewed  Labs reviewed in detail   she is current on GYN exam Dr. Boucher   current on mammograms   Colonoscopy is current in 2021 and will be due in 2026  Current on DEXA scan  Flu vaccine given today  Received three doses  Covid vaccine  Current on Tdap  Discussed shingles vaccine  She did well on the mini cog and clock test  Safety precautions reviewed          Hyperlipidemia, unspecified hyperlipidemia type  Assessment & Plan:  Lipids at goal  Continue rosuvastatin 5 mg p.o. daily      Osteoporosis without current pathological fracture, unspecified osteoporosis type  Assessment & Plan:  Fair control  She is on prolia every 6 months and sees dr Crow  Discussed calcium and vitamin D supplementation      Vitamin D deficiency  Assessment & Plan:  Fair control  Cont vitamin D3,1000 units daily        Cervical myelopathy (CMS/HCC)  Assessment & Plan:  Persisting symptoms  She will continue gabapentin 600 mg three times daily  Follow up with neurology Dr Arnett  She sees him in July yearly      Other orders  -     Influenza vaccine quadrivalent preservative free 6 mon and older IM (FluLaval)  -     fluticasone propionate 50  mcg/actuation nasal spray; Administer 1 spray into each nostril daily.      See Patient Instructions (the written plan) which was given to the patient for PPPS and health risk factors with interventions.

## 2021-10-22 NOTE — PATIENT INSTRUCTIONS
PLAN   flu vaccine today  Drink lots of fluids  Cut back caffeine  Stress reduction  Cont the meds  Diet changes  See me in 6 months                         Your Personalized Prevention Plan Services (PPPS)    Preventive Services Checklist (Assumes Average Risk Unless Otherwise Noted):    Health Maintenance Topics with due status: Overdue       Topic Date Due    HIV Screening Never done    Cervical Cancer Screening Never done    Zoster Vaccine Never done    Mammogram 11/09/2019    Medicare Annual Wellness Visit 12/28/2019    Influenza Vaccine 08/01/2021     Health Maintenance Topics with due status: Not Due       Topic Last Completion Date    DTaP, Tdap, and Td Vaccines 10/12/2015    Colonoscopy 02/05/2021     Health Maintenance Topics with due status: Completed       Topic Last Completion Date    Hepatitis C Screening 12/18/2017    COVID-19 Vaccine 10/01/2021     Health Maintenance Topics with due status: Aged Out       Topic Date Due    Meningococcal ACWY Aged Out    HIB Vaccines Aged Out    IPV Vaccines Aged Out    HPV Vaccines Aged Out    Pneumococcal Aged Out       You May Be Eligible for These Additional Preventive Services   (Assumes Average Risk Unless Otherwise Noted)  Diabetes Screening Any 1 risk factor: hypertension, dyslipidemia, obesity, high glucose; or Any 2 risk factors: >=66yo, overweight, family history diabetes (covered every 6 months)   Hepatitis C Screening Any 1 risk factor: 1) blood transfusion before 1992,   2) current or past injection drug use (annually for high risk; if born between [birth year]-75[birth year], see above for status).   Vaccine: Hepatitis B As necessary if at-risk: hemophilia, ESRD, diabetes, living with individual infected with hep B, healthcare worker with frequent contact with blood/bodily fluids (series covered once)   Sexually Transmitted Diseases (STDs) As necessary chlamydia, gonorrhea, syphilis, hepatitis B (covered annually)  HIV if any 1 risk factor present: 1) <14yo or >66yo and  at increased risk or 2) 15-64yo and ask for it (covered annually)   Lung Cancer Screening Low dose chest CT if all 3 risk factors: 1) 55-76yo, 2) smoker or quit within last 15y, 3) >=30 pack years (covered annually).  No results found for this or any previous visit.       Cholesterol Screening Both risk factors: 1) >=19yo and 2)  increased risk coronary artery disease (covered every 5 years).     Breast Cancer Screening Covered once 35-38yo, annually >=41yo (if >=51yo, see above for status).         Health Risk Factors with Personalized Education:  ----------------------------------------------------------------------------------------------------------------------  Controlling Your Blood Pressure  · Maintain a normal weight (body mass index between 18.5 and 24.9).  · Eat more fruit, vegetables and low-fat dairy.  · Eat less saturated fat and total fat.  · Lower your sodium (salt) intake.  Try to stay under 1500 mg per day, but if you cannot get your intake to be that low, at least lower it by 1000 mg.  · Stay active.  Try to get at least 90 to 150 minutes of exercise per week.  Try brisk walking, swimming, bicycling or dancing.  · Limit alcohol intake.  When you do consume alcohol, drink no more than 1 drink per day.  · If you have been prescribed medication, take it regularly and exactly as prescribed.  Let your PCP know if you have any problems or questions about your medication.  · Check your blood pressure at home or at the store.  Write down your readings and share them with your PCP  ----------------------------------------------------------------------------------------------------------------------  Controlling Your Cholesterol  · Reduce the amount of saturated and trans fat in your diet.  Limit intake of red meat.  Consume only low-fat or non-fat/skim dairy.  Limit fried food.  Cook with vegetable oils.  · Reduce your intake of sugary foods, sugary drinks and alcohol.  · Eat a diet high in fruit,  vegetables and whole grains.  · Get protein from fish, poultry and a small portion of nuts.  · Stay active.  Try to get at least 90 to 150 minutes of exercise per week.  Try brisk walking, swimming, bicycling or dancing.  · Maintain a healthy weight by balancing your diet and exercise.  · If you have been prescribed medication, take it regularly and exactly as prescribed. Let your PCP know if you have any problems or questions about your medication.  · It’s important to know your cholesterol numbers.  When recommended by your PCP, get the cholesterol blood test.  ---------------------------------------------------------------------------------------------------------------------   Controlling Your Osteoporosis, Strengthening Your Bones  · Try to get at least 90 to 150 minutes of weight-bearing exercise per week.  · Ensure intake of at least 1200mg of calcium per day.  Eat foods high in calcium like milk and other dairy, green vegetables, fruit, canned fish with soft and edible bones, nuts, calcium-set tofu.  Some foods are calcium-fortified, like bread, cereal, fruit juices and mineral water.  · Help your body make vitamin D by getting 10-15 minutes per day of sunlight.    · Ensure intake of at least 600IU of vitamin D per day.  Eat foods high in vitamin D like oily fish (salmon, sardines, mackerel) and eggs.  Some foods are fortified with vitamin D, like dairy and cereals.  · Avoid high amounts of caffeine and salt, since they can cause the body to loose calcium.  · Limit alcohol intake, since it is associated with weaker bones and is associated with falls and fractures.  · Limit intake of fizzy drinks.  · If you have been prescribed medication, take it regularly and exactly as prescribed.  Let your PCP know if you have any problems or questions about your medication

## 2021-10-23 NOTE — ASSESSMENT & PLAN NOTE
Health maintenance measures reviewed  Labs reviewed in detail   she is current on GYN exam Dr. Boucher   current on mammograms   Colonoscopy is current in 2021 and will be due in 2026  Current on DEXA scan  Flu vaccine given today  Received three doses  Covid vaccine  Current on Tdap  Discussed shingles vaccine  She did well on the mini cog and clock test  Safety precautions reviewed

## 2021-11-03 ENCOUNTER — TRANSCRIBE ORDERS (OUTPATIENT)
Dept: SCHEDULING | Age: 56
End: 2021-11-03
Payer: MEDICARE

## 2021-11-03 DIAGNOSIS — Q92.2 CHROMOSOME XQ27.3-Q28 DUPLICATION SYNDROME: Primary | ICD-10-CM

## 2021-12-22 NOTE — TELEPHONE ENCOUNTER
Medicine Refill Request    Last Office: 10/22/2021   Last Consult Visit: Visit date not found  Last Telemedicine Visit: 6/22/2020 Maya Tesfaye MD    Next Appointment: 4/22/2022      Current Outpatient Medications:   •  b complex vitamins tablet, Take 400 mg by mouth daily., Disp: , Rfl:   •  cetirizine (ZyrTEC) 10 mg tablet, Take 1 tablet by mouth daily., Disp: , Rfl:   •  cholecalciferol, vitamin D3, (VITAMIN D3) 1,000 unit capsule, take 1 tablet by oral route  every day, Disp: , Rfl:   •  denosumab (PROLIA) 60 mg/mL syringe, Inject 60 mg under the skin once., Disp: , Rfl:   •  fluticasone propionate 50 mcg/actuation nasal spray, Administer 1 spray into each nostril daily., Disp: 48 mL, Rfl: 1  •  folic acid/multivit,iron, (CENTRUM ORAL), Take by mouth., Disp: , Rfl:   •  gabapentin (NEURONTIN) 300 mg capsule, Take 900 mg by mouth 3 (three) times a day.  , Disp: , Rfl:   •  hydrochlorothiazide (HYDRODIURIL) 25 mg tablet, TAKE 1/2 TABLET BY MOUTH EVERY DAY, Disp: 45 tablet, Rfl: 3  •  meloxicam (MOBIC) 7.5 mg tablet, Take 7.5 mg by mouth as needed.  , Disp: , Rfl:   •  pantoprazole (PROTONIX) 40 mg EC tablet, Take 1 tablet (40 mg total) by mouth once daily., Disp: 90 tablet, Rfl: 1  •  potassium chloride (KLOR-CON M10) 10 mEq CR tablet, Take 1 tablet (10 mEq total) by mouth once daily., Disp: 90 tablet, Rfl: 1  •  rosuvastatin (CRESTOR) 5 mg tablet, Take 1 tablet (5 mg total) by mouth once daily., Disp: 90 tablet, Rfl: 1      BP Readings from Last 3 Encounters:   10/22/21 118/78   09/14/21 126/84   07/01/21 100/76       Recent Lab results:  Lab Results   Component Value Date    CHOL 185 10/15/2021   ,   Lab Results   Component Value Date    HDL 69 10/15/2021   ,   Lab Results   Component Value Date    LDLCALC 102 (H) 10/15/2021   ,   Lab Results   Component Value Date    TRIG 69 10/15/2021        Lab Results   Component Value Date    GLUCOSE 92 10/15/2021   ,   Lab Results   Component Value Date    HGBA1C  5.4 10/15/2021         Lab Results   Component Value Date    CREATININE 0.7 10/15/2021       Lab Results   Component Value Date    TSH 2.02 10/15/2021

## 2021-12-23 RX ORDER — POTASSIUM CHLORIDE 750 MG/1
10 TABLET, EXTENDED RELEASE ORAL
Qty: 90 TABLET | Refills: 1 | Status: SHIPPED | OUTPATIENT
Start: 2021-12-23 | End: 2021-12-27 | Stop reason: SDUPTHER

## 2021-12-23 RX ORDER — HYDROCHLOROTHIAZIDE 25 MG/1
12.5 TABLET ORAL
Qty: 45 TABLET | Refills: 3 | Status: SHIPPED | OUTPATIENT
Start: 2021-12-23 | End: 2021-12-27 | Stop reason: SDUPTHER

## 2021-12-23 RX ORDER — FLUTICASONE PROPIONATE 50 MCG
1 SPRAY, SUSPENSION (ML) NASAL DAILY
Qty: 48 ML | Refills: 1 | Status: SHIPPED | OUTPATIENT
Start: 2021-12-23 | End: 2021-12-27 | Stop reason: SDUPTHER

## 2021-12-27 ENCOUNTER — HOSPITAL ENCOUNTER (OUTPATIENT)
Dept: RADIOLOGY | Facility: HOSPITAL | Age: 56
Discharge: HOME | End: 2021-12-27
Attending: NEUROLOGICAL SURGERY
Payer: MEDICARE

## 2021-12-27 DIAGNOSIS — Q92.2 CHROMOSOME XQ27.3-Q28 DUPLICATION SYNDROME: ICD-10-CM

## 2021-12-27 DIAGNOSIS — Q28.3 CAVERNOUS MALFORMATION: ICD-10-CM

## 2021-12-27 PROCEDURE — 70551 MRI BRAIN STEM W/O DYE: CPT

## 2021-12-27 RX ORDER — PANTOPRAZOLE SODIUM 40 MG/1
40 TABLET, DELAYED RELEASE ORAL
Qty: 90 TABLET | Refills: 1 | Status: SHIPPED | OUTPATIENT
Start: 2021-12-27 | End: 2022-04-23

## 2021-12-27 RX ORDER — ROSUVASTATIN CALCIUM 5 MG/1
5 TABLET, COATED ORAL
Qty: 90 TABLET | Refills: 1 | Status: SHIPPED | OUTPATIENT
Start: 2021-12-27 | End: 2022-03-17

## 2021-12-27 RX ORDER — HYDROCHLOROTHIAZIDE 25 MG/1
25 TABLET ORAL
Qty: 90 TABLET | Refills: 1 | Status: SHIPPED | OUTPATIENT
Start: 2021-12-27 | End: 2022-01-03 | Stop reason: SDUPTHER

## 2021-12-27 RX ORDER — FLUTICASONE PROPIONATE 50 MCG
1 SPRAY, SUSPENSION (ML) NASAL DAILY
Qty: 48 ML | Refills: 1 | Status: SHIPPED | OUTPATIENT
Start: 2021-12-27 | End: 2023-02-20

## 2021-12-27 RX ORDER — POTASSIUM CHLORIDE 750 MG/1
10 TABLET, EXTENDED RELEASE ORAL
Qty: 90 TABLET | Refills: 1 | Status: SHIPPED | OUTPATIENT
Start: 2021-12-27 | End: 2022-08-16

## 2022-01-03 RX ORDER — HYDROCHLOROTHIAZIDE 25 MG/1
25 TABLET ORAL
Qty: 90 TABLET | Refills: 1 | Status: SHIPPED | OUTPATIENT
Start: 2022-01-03 | End: 2022-11-10 | Stop reason: SDUPTHER

## 2022-01-03 NOTE — TELEPHONE ENCOUNTER
Current Outpatient Medications on File Prior to Visit   Medication Sig Dispense Refill   • b complex vitamins tablet Take 400 mg by mouth daily.     • cetirizine (ZyrTEC) 10 mg tablet Take 1 tablet by mouth daily.     • cholecalciferol, vitamin D3, (VITAMIN D3) 1,000 unit capsule take 1 tablet by oral route  every day     • denosumab (PROLIA) 60 mg/mL syringe Inject 60 mg under the skin once.     • fluticasone propionate 50 mcg/actuation nasal spray Administer 1 spray into each nostril daily. 48 mL 1   • folic acid/multivit,iron, (CENTRUM ORAL) Take by mouth.     • gabapentin (NEURONTIN) 300 mg capsule Take 900 mg by mouth 3 (three) times a day.       • hydrochlorothiazide 25 mg tablet Take 1 tablet (25 mg total) by mouth once daily. 90 tablet 1   • meloxicam (MOBIC) 7.5 mg tablet Take 7.5 mg by mouth as needed.       • pantoprazole 40 mg EC tablet Take 1 tablet (40 mg total) by mouth once daily. 90 tablet 1   • potassium chloride (KLOR-CON M10) 10 mEq CR tablet Take 1 tablet (10 mEq total) by mouth once daily. 90 tablet 1   • rosuvastatin 5 mg tablet Take 1 tablet (5 mg total) by mouth once daily. 90 tablet 1     No current facility-administered medications on file prior to visit.

## 2022-01-24 ENCOUNTER — TRANSCRIBE ORDERS (OUTPATIENT)
Dept: SCHEDULING | Age: 57
End: 2022-01-24

## 2022-04-12 ENCOUNTER — APPOINTMENT (OUTPATIENT)
Dept: LAB | Facility: CLINIC | Age: 57
End: 2022-04-12
Attending: INTERNAL MEDICINE
Payer: MEDICARE

## 2022-04-12 ENCOUNTER — TELEPHONE (OUTPATIENT)
Dept: INTERNAL MEDICINE | Facility: CLINIC | Age: 57
End: 2022-04-12
Payer: MEDICARE

## 2022-04-12 ENCOUNTER — TELEPHONE (OUTPATIENT)
Dept: INTERNAL MEDICINE | Facility: CLINIC | Age: 57
End: 2022-04-12

## 2022-04-12 DIAGNOSIS — E55.9 VITAMIN D DEFICIENCY: ICD-10-CM

## 2022-04-12 DIAGNOSIS — E78.5 HYPERLIPIDEMIA, UNSPECIFIED HYPERLIPIDEMIA TYPE: Primary | ICD-10-CM

## 2022-04-12 DIAGNOSIS — E78.5 HYPERLIPIDEMIA, UNSPECIFIED HYPERLIPIDEMIA TYPE: ICD-10-CM

## 2022-04-12 DIAGNOSIS — R73.9 HYPERGLYCEMIA: ICD-10-CM

## 2022-04-12 LAB
25(OH)D3 SERPL-MCNC: 62 NG/ML (ref 30–100)
ALBUMIN SERPL-MCNC: 4.4 G/DL (ref 3.4–5)
ALP SERPL-CCNC: 73 IU/L (ref 35–126)
ALT SERPL-CCNC: 21 IU/L (ref 11–54)
ANION GAP SERPL CALC-SCNC: 7 MEQ/L (ref 3–15)
AST SERPL-CCNC: 22 IU/L (ref 15–41)
BASOPHILS # BLD: 0.07 K/UL (ref 0.01–0.1)
BASOPHILS NFR BLD: 0.8 %
BILIRUB SERPL-MCNC: 0.7 MG/DL (ref 0.3–1.2)
BUN SERPL-MCNC: 13 MG/DL (ref 8–20)
CALCIUM SERPL-MCNC: 10.4 MG/DL (ref 8.9–10.3)
CHLORIDE SERPL-SCNC: 100 MEQ/L (ref 98–109)
CHOLEST SERPL-MCNC: 190 MG/DL
CO2 SERPL-SCNC: 31 MEQ/L (ref 22–32)
CREAT SERPL-MCNC: 0.8 MG/DL (ref 0.6–1.1)
CRP SERPL-MCNC: <6 MG/L
DIFFERENTIAL METHOD BLD: ABNORMAL
EOSINOPHIL # BLD: 0.22 K/UL (ref 0.04–0.36)
EOSINOPHIL NFR BLD: 2.6 %
ERYTHROCYTE [DISTWIDTH] IN BLOOD BY AUTOMATED COUNT: 13.9 % (ref 11.7–14.4)
EST. AVERAGE GLUCOSE BLD GHB EST-MCNC: 108 MG/DL
GFR SERPL CREATININE-BSD FRML MDRD: >60 ML/MIN/1.73M*2
GLUCOSE SERPL-MCNC: 95 MG/DL (ref 70–99)
HBA1C MFR BLD HPLC: 5.4 %
HCT VFR BLDCO AUTO: 46.3 % (ref 35–45)
HDLC SERPL-MCNC: 81 MG/DL
HDLC SERPL: 2.3 {RATIO}
HGB BLD-MCNC: 15.2 G/DL (ref 11.8–15.7)
IMM GRANULOCYTES # BLD AUTO: 0.03 K/UL (ref 0–0.08)
IMM GRANULOCYTES NFR BLD AUTO: 0.4 %
LDLC SERPL CALC-MCNC: 91 MG/DL
LYMPHOCYTES # BLD: 3.42 K/UL (ref 1.2–3.5)
LYMPHOCYTES NFR BLD: 40.7 %
MCH RBC QN AUTO: 33.3 PG (ref 28–33.2)
MCHC RBC AUTO-ENTMCNC: 32.8 G/DL (ref 32.2–35.5)
MCV RBC AUTO: 101.3 FL (ref 83–98)
MONOCYTES # BLD: 0.58 K/UL (ref 0.28–0.8)
MONOCYTES NFR BLD: 6.9 %
NEUTROPHILS # BLD: 4.09 K/UL (ref 1.7–7)
NEUTS SEG NFR BLD: 48.6 %
NONHDLC SERPL-MCNC: 109 MG/DL
NRBC BLD-RTO: 0 %
PDW BLD AUTO: 12 FL (ref 9.4–12.3)
PLATELET # BLD AUTO: 446 K/UL (ref 150–369)
POTASSIUM SERPL-SCNC: 4 MEQ/L (ref 3.6–5.1)
PROT SERPL-MCNC: 7 G/DL (ref 6–8.2)
RBC # BLD AUTO: 4.57 M/UL (ref 3.93–5.22)
SODIUM SERPL-SCNC: 138 MEQ/L (ref 136–144)
TRIGL SERPL-MCNC: 88 MG/DL (ref 30–149)
TSH SERPL DL<=0.05 MIU/L-ACNC: 4.07 MIU/L (ref 0.34–5.6)
WBC # BLD AUTO: 8.41 K/UL (ref 3.8–10.5)

## 2022-04-12 PROCEDURE — 82306 VITAMIN D 25 HYDROXY: CPT

## 2022-04-12 PROCEDURE — 86140 C-REACTIVE PROTEIN: CPT

## 2022-04-12 PROCEDURE — 80053 COMPREHEN METABOLIC PANEL: CPT

## 2022-04-12 PROCEDURE — 84443 ASSAY THYROID STIM HORMONE: CPT

## 2022-04-12 PROCEDURE — 36415 COLL VENOUS BLD VENIPUNCTURE: CPT

## 2022-04-12 PROCEDURE — 80061 LIPID PANEL: CPT

## 2022-04-12 PROCEDURE — 83036 HEMOGLOBIN GLYCOSYLATED A1C: CPT

## 2022-04-12 PROCEDURE — 85025 COMPLETE CBC W/AUTO DIFF WBC: CPT

## 2022-04-22 ENCOUNTER — OFFICE VISIT (OUTPATIENT)
Dept: INTERNAL MEDICINE | Facility: CLINIC | Age: 57
End: 2022-04-22
Payer: MEDICARE

## 2022-04-22 VITALS
HEART RATE: 74 BPM | OXYGEN SATURATION: 99 % | HEIGHT: 57 IN | DIASTOLIC BLOOD PRESSURE: 76 MMHG | WEIGHT: 112 LBS | RESPIRATION RATE: 18 BRPM | SYSTOLIC BLOOD PRESSURE: 110 MMHG | BODY MASS INDEX: 24.16 KG/M2

## 2022-04-22 DIAGNOSIS — E55.9 VITAMIN D DEFICIENCY: ICD-10-CM

## 2022-04-22 DIAGNOSIS — E78.5 HYPERLIPIDEMIA, UNSPECIFIED HYPERLIPIDEMIA TYPE: ICD-10-CM

## 2022-04-22 DIAGNOSIS — E83.52 HYPERCALCEMIA: ICD-10-CM

## 2022-04-22 DIAGNOSIS — R25.2 MUSCLE CRAMPS: ICD-10-CM

## 2022-04-22 DIAGNOSIS — G95.9 CERVICAL MYELOPATHY (CMS/HCC): ICD-10-CM

## 2022-04-22 PROCEDURE — 99214 OFFICE O/P EST MOD 30 MIN: CPT | Performed by: INTERNAL MEDICINE

## 2022-04-22 NOTE — PATIENT INSTRUCTIONS
PLAN    Hold crestor for 2-3 weeks  Stop tums  Drink lots of fluids  'get blood work  No protonix  Cont the meds  Call me in few weeks with an update

## 2022-04-22 NOTE — PROGRESS NOTES
Subjective      Patient ID: Rajwinder Wynne is a 56 y.o. female     HPI     Here for follow up on chronic conditions  and review results    She had an MRI brain for follow up on cavernoma  Also saw ENT at [place] for hearing eval and post infection and exam was normal  Has had more muscle cramps in legs and also in her arms  Takes protonix only when she takes meloxicam for chronic neck pain  Has  been taking more Tums recently    Past Medical History:   Diagnosis Date   • Allergic rhinitis    • Cerebral cavernoma     parietal cavernoma   • Cervical myelopathy (CMS/HCC)    • Cervical radiculopathy    • Cervicogenic headache    • Closed fracture of sesamoid bone of foot    • Cricopharyngeal dysphagia    • Fibroids    • Food allergy    • Fractured toe    • GERD (gastroesophageal reflux disease)    • Lipid disorder    • Nephrolithiasis    • Osteoporosis        Past Surgical History:   Procedure Laterality Date   • ANTERIOR FUSION CERVICAL SPINE      c6-7   • BUNIONECTOMY     • CRICOPHARYNGEAL MYOTOMY     • KNEE SURGERY     • LUMBAR FUSION      L5-S1   • MYOMECTOMY     • THROAT SURGERY     • TIBIAL SESAMOID EXCISION         Family History   Problem Relation Age of Onset   • Hyperlipidemia Biological Mother    • Hypertension Biological Mother    • Pulmonary fibrosis Biological Father    • Heart attack Biological Father    • Colon cancer Maternal Grandfather    • Breast cancer Mother's Sister    • Ovarian cancer Father's Sister        Social History     Socioeconomic History   • Marital status:      Spouse name: Not on file   • Number of children: Not on file   • Years of education: Not on file   • Highest education level: Not on file   Occupational History   • Not on file   Tobacco Use   • Smoking status: Never Smoker   • Smokeless tobacco: Never Used   Vaping Use   • Vaping Use: Never used   Substance and Sexual Activity   • Alcohol use: No   • Drug use: No   • Sexual activity: Not on file   Other Topics Concern   •  Not on file   Social History Narrative   • Not on file     Social Determinants of Health     Financial Resource Strain: Not on file   Food Insecurity: Not on file   Transportation Needs: Not on file   Physical Activity: Not on file   Stress: Not on file   Social Connections: Not on file   Intimate Partner Violence: Not on file   Housing Stability: Not on file       Allergies   Allergen Reactions   • Liriano Flavor Other (see comments)   • Shellfish Containing Products Anaphylaxis     Pumpkin seeds   • Adhesive      Steri strips   • Alpha Other (see comments)   • Banana Other (see comments)     also allergic to almonds, berries   • Erythromycin    • Erythromycin Base Other (see comments)     GI Upset  Other reaction(s): GI side effects   • Iodine Other (see comments)     shell fish allergy   • Latex Other (see comments)     patient not sure   • Levonorgestrel-Ethinyl Estrad    • Naprelan    • Naproxen Other (see comments)     dysphagia throat burning   • Naproxen Sodium      Other reaction(s): lump in throat   • Penicillin G    • Penicillin V Potassium Hives   • Penicillins Hives   • Pineapple Other (see comments)   • Pumpkin Seed      Other reaction(s): throat gets tight   • Shellfish Derived Hives       Current Outpatient Medications   Medication Sig Dispense Refill   • b complex vitamins tablet Take 400 mg by mouth daily.     • cetirizine (ZyrTEC) 10 mg tablet Take 1 tablet by mouth daily.     • cholecalciferol, vitamin D3, 25 mcg (1,000 unit) capsule take 1 tablet by oral route  every day     • denosumab (PROLIA) 60 mg/mL syringe Inject 60 mg under the skin once.     • fluticasone propionate 50 mcg/actuation nasal spray Administer 1 spray into each nostril daily. 48 mL 1   • folic acid/multivit,iron, (CENTRUM ORAL) Take by mouth.     • gabapentin (NEURONTIN) 300 mg capsule Take 900 mg by mouth 3 (three) times a day.       • hydrochlorothiazide 25 mg tablet Take 1 tablet (25 mg total) by mouth once daily. 90  "tablet 1   • meloxicam (MOBIC) 7.5 mg tablet Take 7.5 mg by mouth as needed.       • potassium chloride (KLOR-CON M10) 10 mEq CR tablet Take 1 tablet (10 mEq total) by mouth once daily. 90 tablet 1   • rosuvastatin (CRESTOR) 5 mg tablet TAKE 1 TABLET DAILY (NEEDS MORE REFILLS) 90 tablet 1     No current facility-administered medications for this visit.       Visit Vitals  /76   Pulse 74   Resp 18   Ht 1.448 m (4' 9\")   Wt 50.8 kg (112 lb) Comment: per patient   SpO2 99%   BMI 24.24 kg/m²         The following have been reviewed and updated as appropriate in this visit:   Allergies  Meds  Problems         Review of Systems   Constitutional: Negative for chills, fatigue and fever.   HENT: Negative for ear pain and sore throat.    Eyes: Negative for pain and visual disturbance.   Respiratory: Negative for cough and shortness of breath.    Cardiovascular: Negative for chest pain, palpitations and leg swelling.   Gastrointestinal: Negative for abdominal pain, blood in stool, diarrhea, nausea and vomiting.   Genitourinary: Negative for dysuria and hematuria.   Musculoskeletal: Positive for back pain and neck pain.   Skin: Negative for rash.   Allergic/Immunologic: Negative for environmental allergies.   Neurological: Negative for dizziness, numbness and headaches.   Hematological: Does not bruise/bleed easily.   Psychiatric/Behavioral: Negative for confusion.       Objective       Physical Exam  Vitals and nursing note reviewed.   Constitutional:       Appearance: She is well-developed.   HENT:      Head: Normocephalic and atraumatic.      Right Ear: External ear normal.      Left Ear: External ear normal.      Nose: Nose normal.   Eyes:      Conjunctiva/sclera: Conjunctivae normal.      Pupils: Pupils are equal, round, and reactive to light.   Neck:      Thyroid: No thyromegaly.   Cardiovascular:      Rate and Rhythm: Normal rate and regular rhythm.      Heart sounds: Normal heart sounds.   Pulmonary:      " Effort: Pulmonary effort is normal. No respiratory distress.      Breath sounds: Normal breath sounds.   Chest:      Chest wall: No tenderness.   Abdominal:      General: Bowel sounds are normal.      Palpations: Abdomen is soft.      Tenderness: There is no abdominal tenderness. There is no guarding.   Musculoskeletal:         General: No tenderness. Normal range of motion.      Cervical back: Normal range of motion and neck supple.      Comments: Tightness of cervical muscles   Lymphadenopathy:      Cervical: No cervical adenopathy.   Skin:     General: Skin is warm and dry.      Findings: No rash.   Neurological:      Mental Status: She is alert and oriented to person, place, and time.      Cranial Nerves: No cranial nerve deficit.      Sensory: No sensory deficit.   Psychiatric:         Behavior: Behavior normal.         Assessment/Plan     Muscle cramps  ?due to statin  She will stop rosuvastatin for few weeks  If the cramps resolve then we will switch to atorvastatin  Call me in few weeks    Hyperlipidemia  reveiwed labs  LDL is good at goal  Will repeat in 3 months    Vitamin D deficiency  Fair control  Cont vitamin D3,1000 units daily      Hypercalcemia  Minimal elevation  She has been taking more tums  advsied her to stop tums right away   Drink lots of water  Repeat lab in few weeks    Cervical myelopathy (CMS/HCC)  Persisting symptoms  She will continue gabapentin 600 mg three times daily  Follow up with neurology Dr Arnett  She sees him in July yearly        Maya Tesfaye MD  4/23/2022  6:55 AM

## 2022-04-23 PROBLEM — R25.2 MUSCLE CRAMPS: Status: ACTIVE | Noted: 2022-04-23

## 2022-04-23 PROBLEM — E83.52 HYPERCALCEMIA: Status: ACTIVE | Noted: 2022-04-23

## 2022-04-23 ASSESSMENT — ENCOUNTER SYMPTOMS
DIZZINESS: 0
BLOOD IN STOOL: 0
NECK PAIN: 1
FEVER: 0
DYSURIA: 0
CHILLS: 0
CONFUSION: 0
HEADACHES: 0
EYE PAIN: 0
DIARRHEA: 0
HEMATURIA: 0
SHORTNESS OF BREATH: 0
SORE THROAT: 0
COUGH: 0
ABDOMINAL PAIN: 0
VOMITING: 0
FATIGUE: 0
NUMBNESS: 0
BRUISES/BLEEDS EASILY: 0
PALPITATIONS: 0
NAUSEA: 0
BACK PAIN: 1

## 2022-04-23 NOTE — ASSESSMENT & PLAN NOTE
Minimal elevation  She has been taking more tums  advsied her to stop tums right away   Drink lots of water  Repeat lab in few weeks   H Plasty Text: Given the location of the defect, shape of the defect and the proximity to free margins a H-plasty was deemed most appropriate for repair.  Using a sterile surgical marker, the appropriate advancement arms of the H-plasty were drawn incorporating the defect and placing the expected incisions within the relaxed skin tension lines where possible. The area thus outlined was incised deep to adipose tissue with a #15 scalpel blade. The skin margins were undermined to an appropriate distance in all directions utilizing iris scissors.  The opposing advancement arms were then advanced into place in opposite direction and anchored with interrupted buried subcutaneous sutures.

## 2022-04-23 NOTE — ASSESSMENT & PLAN NOTE
?due to statin  She will stop rosuvastatin for few weeks  If the cramps resolve then we will switch to atorvastatin  Call me in few weeks

## 2022-05-23 ENCOUNTER — TELEPHONE (OUTPATIENT)
Dept: INTERNAL MEDICINE | Facility: CLINIC | Age: 57
End: 2022-05-23
Payer: MEDICARE

## 2022-05-23 RX ORDER — PANTOPRAZOLE SODIUM 40 MG/1
TABLET, DELAYED RELEASE ORAL
Qty: 90 TABLET | Refills: 0 | Status: SHIPPED | OUTPATIENT
Start: 2022-05-23 | End: 2022-11-10

## 2022-05-23 NOTE — TELEPHONE ENCOUNTER
Rosuvastatin was stopped for a few weeks, however, is pt still exp intermittent muscle cramping in her thighs, and also exp charley horse.     Pt does not believe the cramping is for the statin

## 2022-05-23 NOTE — TELEPHONE ENCOUNTER
Spoke with the patient and she told me that she stopped her rosuvastatin and she still has cramps though not every day we discussed that she will increase the magnesium to 200 mg daily she is also advised to drink lots of fluids we discussed that it could also be due to back issues and that she may need to see the back specialist if it continues   she will restart physical therapy next week  We also discussed that she will repeat the labs for the mild elevation in the calcium in the coming 2 weeks

## 2022-06-19 ENCOUNTER — APPOINTMENT (EMERGENCY)
Dept: RADIOLOGY | Facility: HOSPITAL | Age: 57
End: 2022-06-19
Attending: EMERGENCY MEDICINE
Payer: MEDICARE

## 2022-06-19 ENCOUNTER — HOSPITAL ENCOUNTER (EMERGENCY)
Facility: HOSPITAL | Age: 57
Discharge: HOME | End: 2022-06-19
Attending: EMERGENCY MEDICINE
Payer: MEDICARE

## 2022-06-19 VITALS
HEIGHT: 57 IN | SYSTOLIC BLOOD PRESSURE: 139 MMHG | HEART RATE: 97 BPM | BODY MASS INDEX: 22.87 KG/M2 | TEMPERATURE: 98.2 F | OXYGEN SATURATION: 98 % | WEIGHT: 106 LBS | RESPIRATION RATE: 18 BRPM | DIASTOLIC BLOOD PRESSURE: 87 MMHG

## 2022-06-19 DIAGNOSIS — S90.31XA CONTUSION OF RIGHT FOOT, INITIAL ENCOUNTER: Primary | ICD-10-CM

## 2022-06-19 PROCEDURE — 99283 EMERGENCY DEPT VISIT LOW MDM: CPT | Mod: 25

## 2022-06-19 PROCEDURE — 73630 X-RAY EXAM OF FOOT: CPT | Mod: RT

## 2022-06-19 ASSESSMENT — ENCOUNTER SYMPTOMS
EXTREMITY NUMBNESS: 0
FEVER: 0

## 2022-06-19 NOTE — DISCHARGE INSTRUCTIONS
Apply ice to area  Take Ibuprofen and or Tylenol as needed for pain  Keep elevated  Use ACE wrap as needed  Return for any new or concerning symptoms

## 2022-06-19 NOTE — ED PROVIDER NOTES
Emergency Medicine Note  HPI   HISTORY OF PRESENT ILLNESS       History provided by:  Patient and spouse  Lower Extremity Issue  Location:  Foot  Time since incident: last night.  Injury: yes    Mechanism of injury comment:  Pt states door closed on R foot. Since has had worsening pain but able to ambulate  Foot location:  R foot  Associated symptoms: decreased ROM    Associated symptoms: no fever, no numbness and no swelling          Patient History   PAST HISTORY     Reviewed from Nursing Triage:         Past Medical History:   Diagnosis Date   • Allergic rhinitis    • Cerebral cavernoma     parietal cavernoma   • Cervical myelopathy (CMS/HCC)    • Cervical radiculopathy    • Cervicogenic headache    • Closed fracture of sesamoid bone of foot    • Cricopharyngeal dysphagia    • Fibroids    • Food allergy    • Fractured toe    • GERD (gastroesophageal reflux disease)    • Lipid disorder    • Nephrolithiasis    • Osteoporosis        Past Surgical History:   Procedure Laterality Date   • ANTERIOR FUSION CERVICAL SPINE      c6-7   • BUNIONECTOMY     • CRICOPHARYNGEAL MYOTOMY     • KNEE SURGERY     • LUMBAR FUSION      L5-S1   • MYOMECTOMY     • THROAT SURGERY     • TIBIAL SESAMOID EXCISION         Family History   Problem Relation Age of Onset   • Hyperlipidemia Biological Mother    • Hypertension Biological Mother    • Pulmonary fibrosis Biological Father    • Heart attack Biological Father    • Colon cancer Maternal Grandfather    • Breast cancer Mother's Sister    • Ovarian cancer Father's Sister        Social History     Tobacco Use   • Smoking status: Never Smoker   • Smokeless tobacco: Never Used   Vaping Use   • Vaping Use: Never used   Substance Use Topics   • Alcohol use: No   • Drug use: No         Review of Systems   REVIEW OF SYSTEMS     Review of Systems   Constitutional: Negative for fever.         VITALS     ED Vitals    Date/Time Temp Pulse Resp BP SpO2 [facility]   06/19/22 1228 36.8 °C (98.2 °F) 97 18  139/87 98 % DMM        Pulse Ox %: 98 % (06/19/22 1239)  Pulse Ox Interpretation: Normal (06/19/22 1239)           Physical Exam   PHYSICAL EXAM     Physical Exam  Vitals and nursing note reviewed.   Constitutional:       General: She is not in acute distress.     Appearance: Normal appearance.   HENT:      Head: Atraumatic.   Cardiovascular:      Rate and Rhythm: Normal rate.      Pulses: Normal pulses.   Pulmonary:      Effort: Pulmonary effort is normal.   Musculoskeletal:      Right knee: No swelling or bony tenderness. Normal range of motion.      Right ankle: No swelling. No tenderness. Normal range of motion.      Right Achilles Tendon: No tenderness or defects.      Right foot: Normal range of motion and normal capillary refill. Tenderness (mostly to medial foot without swelling) present. No swelling, deformity, laceration or crepitus. Normal pulse.   Skin:     General: Skin is warm and dry.      Capillary Refill: Capillary refill takes less than 2 seconds.   Neurological:      Mental Status: She is alert and oriented to person, place, and time.           PROCEDURES     Procedures     DATA     Results     None          Imaging Results          X-RAY FOOT RIGHT 3+ VIEWS (Final result)  Result time 06/19/22 13:13:01    ED Interpretation    NAD                  Final result                 Impression:    IMPRESSION:    No acute osseous abnormality on plain films. Please see above. Postop changes  first MTP joint of the great toe                 Narrative:    CLINICAL HISTORY: injury    COMMENT:    COMPARISON:NONE    4 views of the right foot submitted for interpretation.  There is no evidence of acute fracture or dislocation. Evidence of hallux valgus  3. There is a screw noted at the head of the first metatarsal. There is evidence  of right first metatarsal osteotomy. Joint spaces show no acute abnormalities.  The soft tissues demonstrate no obvious acute abnormality.                                No orders  to display       Scoring tools                                 ED Course & MDM   MDM / ED COURSE / CLINICAL IMPRESSIONS / DISPO     MDM    ED Course as of 06/19/22 1514   Sun Jun 19, 2022   1313 X-ray no fracture. Probable contusion. ACE wrap applied. Unfortunately we do not have small enough post op shoe but patient able to ambulate in sneaker  Pt already has orthopedist for f/u  Pt stable for discharge  [NH]      ED Course User Index  [NH] Emily Freedman PA C         Clinical Impressions as of 06/19/22 1514   Contusion of right foot, initial encounter     Discharge         Emily Freedman PA C  06/19/22 1514

## 2022-06-20 NOTE — ED ATTESTATION NOTE
I have personally seen and examined the patient.  I reviewed and agree with physician assistant / nurse practitioner’s assessment and plan of care, with the following exceptions: None  My examination, assessment, and plan of care of Rajwinder Wynne is as follows:     Exam: Well-appearing, no acute distress, wake alert oriented x3, right foot, normal in appearance, no redness or warmth, normal pulses, mild tenderness at the medial aspect of the foot    Assessment and plan: Patient with injury to the right foot, x-ray unremarkable, symptomatic treatment and outpatient follow-up     Remy Mcclellan,   06/20/22 0135

## 2022-06-27 ENCOUNTER — TELEPHONE (OUTPATIENT)
Dept: INTERNAL MEDICINE | Facility: CLINIC | Age: 57
End: 2022-06-27
Payer: MEDICARE

## 2022-06-27 ENCOUNTER — APPOINTMENT (OUTPATIENT)
Dept: LAB | Facility: CLINIC | Age: 57
End: 2022-06-27
Attending: INTERNAL MEDICINE
Payer: MEDICARE

## 2022-06-27 DIAGNOSIS — E55.9 VITAMIN D DEFICIENCY: ICD-10-CM

## 2022-06-27 DIAGNOSIS — E78.5 HYPERLIPIDEMIA, UNSPECIFIED HYPERLIPIDEMIA TYPE: ICD-10-CM

## 2022-06-27 LAB
25(OH)D3 SERPL-MCNC: 43 NG/ML (ref 30–100)
ALBUMIN SERPL-MCNC: 4 G/DL (ref 3.4–5)
ALP SERPL-CCNC: 40 IU/L (ref 35–126)
ALT SERPL-CCNC: 18 IU/L (ref 11–54)
ANION GAP SERPL CALC-SCNC: 10 MEQ/L (ref 3–15)
AST SERPL-CCNC: 21 IU/L (ref 15–41)
BASOPHILS # BLD: 0.07 K/UL (ref 0.01–0.1)
BASOPHILS NFR BLD: 1.2 %
BILIRUB SERPL-MCNC: 0.5 MG/DL (ref 0.3–1.2)
BUN SERPL-MCNC: 12 MG/DL (ref 8–20)
CALCIUM SERPL-MCNC: 9.8 MG/DL (ref 8.9–10.3)
CHLORIDE SERPL-SCNC: 101 MEQ/L (ref 98–109)
CHOLEST SERPL-MCNC: 168 MG/DL
CO2 SERPL-SCNC: 28 MEQ/L (ref 22–32)
CREAT SERPL-MCNC: 0.8 MG/DL (ref 0.6–1.1)
DIFFERENTIAL METHOD BLD: ABNORMAL
EOSINOPHIL # BLD: 0.52 K/UL (ref 0.04–0.36)
EOSINOPHIL NFR BLD: 8.7 %
ERYTHROCYTE [DISTWIDTH] IN BLOOD BY AUTOMATED COUNT: 13.6 % (ref 11.7–14.4)
GFR SERPL CREATININE-BSD FRML MDRD: >60 ML/MIN/1.73M*2
GLUCOSE SERPL-MCNC: 94 MG/DL (ref 70–99)
HCT VFR BLDCO AUTO: 42.2 % (ref 35–45)
HDLC SERPL-MCNC: 53 MG/DL
HDLC SERPL: 3.2 {RATIO}
HGB BLD-MCNC: 14.3 G/DL (ref 11.8–15.7)
IMM GRANULOCYTES # BLD AUTO: 0.01 K/UL (ref 0–0.08)
IMM GRANULOCYTES NFR BLD AUTO: 0.2 %
LDLC SERPL CALC-MCNC: 89 MG/DL
LYMPHOCYTES # BLD: 2.81 K/UL (ref 1.2–3.5)
LYMPHOCYTES NFR BLD: 46.8 %
MCH RBC QN AUTO: 34.2 PG (ref 28–33.2)
MCHC RBC AUTO-ENTMCNC: 33.9 G/DL (ref 32.2–35.5)
MCV RBC AUTO: 101 FL (ref 83–98)
MONOCYTES # BLD: 0.48 K/UL (ref 0.28–0.8)
MONOCYTES NFR BLD: 8 %
NEUTROPHILS # BLD: 2.11 K/UL (ref 1.7–7)
NEUTS SEG NFR BLD: 35.1 %
NONHDLC SERPL-MCNC: 115 MG/DL
NRBC BLD-RTO: 0 %
PDW BLD AUTO: 13.8 FL (ref 9.4–12.3)
PLATELET # BLD AUTO: 270 K/UL (ref 150–369)
POTASSIUM SERPL-SCNC: 4.1 MEQ/L (ref 3.6–5.1)
PROT SERPL-MCNC: 6 G/DL (ref 6–8.2)
RBC # BLD AUTO: 4.18 M/UL (ref 3.93–5.22)
SODIUM SERPL-SCNC: 139 MEQ/L (ref 136–144)
TRIGL SERPL-MCNC: 128 MG/DL (ref 30–149)
WBC # BLD AUTO: 6 K/UL (ref 3.8–10.5)

## 2022-06-27 PROCEDURE — 85025 COMPLETE CBC W/AUTO DIFF WBC: CPT

## 2022-06-27 PROCEDURE — 80053 COMPREHEN METABOLIC PANEL: CPT

## 2022-06-27 PROCEDURE — 36415 COLL VENOUS BLD VENIPUNCTURE: CPT

## 2022-06-27 PROCEDURE — 80061 LIPID PANEL: CPT

## 2022-06-27 PROCEDURE — 82306 VITAMIN D 25 HYDROXY: CPT

## 2022-06-27 NOTE — TELEPHONE ENCOUNTER
Spoke with the patient and reviewed the test results in detail her calcium is back to normal liver kidney function is within normal limits her vitamin D is good she will continue the current dose of vitamin D3  Her cholesterol numbers are improved and she will continue the current dose of rosuvastatin

## 2022-10-07 ENCOUNTER — TELEPHONE (OUTPATIENT)
Dept: INTERNAL MEDICINE | Facility: CLINIC | Age: 57
End: 2022-10-07
Payer: MEDICARE

## 2022-10-10 RX ORDER — ROSUVASTATIN CALCIUM 5 MG/1
TABLET, COATED ORAL
Qty: 90 TABLET | Refills: 3 | Status: SHIPPED | OUTPATIENT
Start: 2022-10-10 | End: 2023-08-30

## 2022-11-04 ENCOUNTER — APPOINTMENT (OUTPATIENT)
Dept: LAB | Facility: CLINIC | Age: 57
End: 2022-11-04
Attending: INTERNAL MEDICINE
Payer: MEDICARE

## 2022-11-04 ENCOUNTER — TELEPHONE (OUTPATIENT)
Dept: INTERNAL MEDICINE | Facility: CLINIC | Age: 57
End: 2022-11-04
Payer: MEDICARE

## 2022-11-04 DIAGNOSIS — Z00.00 HEALTH CARE MAINTENANCE: ICD-10-CM

## 2022-11-04 DIAGNOSIS — Z00.00 HEALTH CARE MAINTENANCE: Primary | ICD-10-CM

## 2022-11-04 DIAGNOSIS — E78.5 HYPERLIPIDEMIA, UNSPECIFIED HYPERLIPIDEMIA TYPE: ICD-10-CM

## 2022-11-04 DIAGNOSIS — R73.9 HYPERGLYCEMIA: ICD-10-CM

## 2022-11-04 LAB
ALBUMIN SERPL-MCNC: 4.2 G/DL (ref 3.4–5)
ALP SERPL-CCNC: 66 IU/L (ref 35–126)
ALT SERPL-CCNC: 15 IU/L (ref 11–54)
ANION GAP SERPL CALC-SCNC: 9 MEQ/L (ref 3–15)
AST SERPL-CCNC: 19 IU/L (ref 15–41)
BASOPHILS # BLD: 0.06 K/UL (ref 0.01–0.1)
BASOPHILS NFR BLD: 0.8 %
BILIRUB SERPL-MCNC: 0.7 MG/DL (ref 0.3–1.2)
BUN SERPL-MCNC: 15 MG/DL (ref 8–20)
CALCIUM SERPL-MCNC: 10 MG/DL (ref 8.9–10.3)
CHLORIDE SERPL-SCNC: 101 MEQ/L (ref 98–109)
CHOLEST SERPL-MCNC: 182 MG/DL
CO2 SERPL-SCNC: 29 MEQ/L (ref 22–32)
CREAT SERPL-MCNC: 0.7 MG/DL (ref 0.6–1.1)
DIFFERENTIAL METHOD BLD: ABNORMAL
EOSINOPHIL # BLD: 0.21 K/UL (ref 0.04–0.36)
EOSINOPHIL NFR BLD: 2.8 %
ERYTHROCYTE [DISTWIDTH] IN BLOOD BY AUTOMATED COUNT: 13 % (ref 11.7–14.4)
EST. AVERAGE GLUCOSE BLD GHB EST-MCNC: 105 MG/DL
GFR SERPL CREATININE-BSD FRML MDRD: >60 ML/MIN/1.73M*2
GLUCOSE SERPL-MCNC: 91 MG/DL (ref 70–99)
HBA1C MFR BLD HPLC: 5.3 %
HCT VFR BLDCO AUTO: 43.6 % (ref 35–45)
HDLC SERPL-MCNC: 79 MG/DL
HDLC SERPL: 2.3 {RATIO}
HGB BLD-MCNC: 14.1 G/DL (ref 11.8–15.7)
IMM GRANULOCYTES # BLD AUTO: 0.01 K/UL (ref 0–0.08)
IMM GRANULOCYTES NFR BLD AUTO: 0.1 %
LDLC SERPL CALC-MCNC: 87 MG/DL
LYMPHOCYTES # BLD: 2.63 K/UL (ref 1.2–3.5)
LYMPHOCYTES NFR BLD: 34.9 %
MCH RBC QN AUTO: 33.3 PG (ref 28–33.2)
MCHC RBC AUTO-ENTMCNC: 32.3 G/DL (ref 32.2–35.5)
MCV RBC AUTO: 102.8 FL (ref 83–98)
MONOCYTES # BLD: 0.57 K/UL (ref 0.28–0.8)
MONOCYTES NFR BLD: 7.6 %
NEUTROPHILS # BLD: 4.05 K/UL (ref 1.7–7)
NEUTS SEG NFR BLD: 53.8 %
NONHDLC SERPL-MCNC: 103 MG/DL
NRBC BLD-RTO: 0 %
PDW BLD AUTO: 12.3 FL (ref 9.4–12.3)
PLATELET # BLD AUTO: 343 K/UL (ref 150–369)
POTASSIUM SERPL-SCNC: 4.2 MEQ/L (ref 3.6–5.1)
PROT SERPL-MCNC: 6.7 G/DL (ref 6–8.2)
RBC # BLD AUTO: 4.24 M/UL (ref 3.93–5.22)
SODIUM SERPL-SCNC: 139 MEQ/L (ref 136–144)
TRIGL SERPL-MCNC: 78 MG/DL (ref 30–149)
WBC # BLD AUTO: 7.53 K/UL (ref 3.8–10.5)

## 2022-11-04 PROCEDURE — 80061 LIPID PANEL: CPT

## 2022-11-04 PROCEDURE — 85025 COMPLETE CBC W/AUTO DIFF WBC: CPT

## 2022-11-04 PROCEDURE — 80053 COMPREHEN METABOLIC PANEL: CPT

## 2022-11-04 PROCEDURE — 83036 HEMOGLOBIN GLYCOSYLATED A1C: CPT

## 2022-11-04 PROCEDURE — 36415 COLL VENOUS BLD VENIPUNCTURE: CPT

## 2022-11-10 ENCOUNTER — OFFICE VISIT (OUTPATIENT)
Dept: INTERNAL MEDICINE | Facility: CLINIC | Age: 57
End: 2022-11-10
Payer: MEDICARE

## 2022-11-10 ENCOUNTER — TELEPHONE (OUTPATIENT)
Dept: INTERNAL MEDICINE | Facility: CLINIC | Age: 57
End: 2022-11-10

## 2022-11-10 VITALS
DIASTOLIC BLOOD PRESSURE: 70 MMHG | OXYGEN SATURATION: 99 % | SYSTOLIC BLOOD PRESSURE: 118 MMHG | HEIGHT: 57 IN | BODY MASS INDEX: 23.08 KG/M2 | HEART RATE: 86 BPM | RESPIRATION RATE: 17 BRPM | WEIGHT: 107 LBS

## 2022-11-10 DIAGNOSIS — E78.5 HYPERLIPIDEMIA, UNSPECIFIED HYPERLIPIDEMIA TYPE: ICD-10-CM

## 2022-11-10 DIAGNOSIS — R13.13 CRICOPHARYNGEAL DYSPHAGIA: ICD-10-CM

## 2022-11-10 DIAGNOSIS — R09.81 NASAL CONGESTION: ICD-10-CM

## 2022-11-10 DIAGNOSIS — K21.9 GASTROESOPHAGEAL REFLUX DISEASE WITHOUT ESOPHAGITIS: ICD-10-CM

## 2022-11-10 DIAGNOSIS — Z91.013 ALLERGY TO SHELLFISH: ICD-10-CM

## 2022-11-10 PROCEDURE — 99213 OFFICE O/P EST LOW 20 MIN: CPT | Performed by: INTERNAL MEDICINE

## 2022-11-10 RX ORDER — EPINEPHRINE 0.3 MG/.3ML
1 INJECTION SUBCUTANEOUS AS NEEDED
Qty: 1 EACH | Refills: 1 | Status: SHIPPED | OUTPATIENT
Start: 2022-11-10 | End: 2022-12-10

## 2022-11-10 RX ORDER — HYDROCHLOROTHIAZIDE 25 MG/1
25 TABLET ORAL
Qty: 90 TABLET | Refills: 1 | Status: SHIPPED | OUTPATIENT
Start: 2022-11-10 | End: 2023-05-09

## 2022-11-10 NOTE — PATIENT INSTRUCTIONS
PLAN      Cont protonix daily  Mucinex daily  Cont the meds  Follow up with ENT  See me in 6 months

## 2022-11-10 NOTE — PROGRESS NOTES
Subjective      Patient ID: Rajwinder Wynne is a 56 y.o. female     HPI     Here for follow up on ongoing issues  Saw ENT Dr Mendez as she was having issues with swallowing   Has history of CP myotomy and esophageal dilatation in the past  Also has deviated nasal septum and was suggested to have septal surgery  She lives with her mom now who has mild cognitive impairment   Review test results    Past Medical History:   Diagnosis Date    Allergic rhinitis     Cerebral cavernoma     parietal cavernoma    Cervical myelopathy (CMS/HCC)     Cervical radiculopathy     Cervicogenic headache     Closed fracture of sesamoid bone of foot     Cricopharyngeal dysphagia     Fibroids     Food allergy     Fractured toe     GERD (gastroesophageal reflux disease)     Lipid disorder     Nephrolithiasis     Osteoporosis        Past Surgical History:   Procedure Laterality Date    ANTERIOR FUSION CERVICAL SPINE      c6-7    BUNIONECTOMY      CRICOPHARYNGEAL MYOTOMY      KNEE SURGERY      LUMBAR FUSION      L5-S1    MYOMECTOMY      THROAT SURGERY      TIBIAL SESAMOID EXCISION         Family History   Problem Relation Age of Onset    Hyperlipidemia Biological Mother     Hypertension Biological Mother     Pulmonary fibrosis Biological Father     Heart attack Biological Father     Colon cancer Maternal Grandfather     Breast cancer Mother's Sister     Ovarian cancer Father's Sister        Social History     Socioeconomic History    Marital status:      Spouse name: Not on file    Number of children: Not on file    Years of education: Not on file    Highest education level: Not on file   Occupational History    Not on file   Tobacco Use    Smoking status: Never    Smokeless tobacco: Never   Vaping Use    Vaping Use: Never used   Substance and Sexual Activity    Alcohol use: No    Drug use: No    Sexual activity: Not on file   Other Topics Concern    Not on file   Social History Narrative     Not on file     Social Determinants of Health     Financial Resource Strain: Not on file   Food Insecurity: No Food Insecurity    Worried About Running Out of Food in the Last Year: Never true    Ran Out of Food in the Last Year: Never true   Transportation Needs: Not on file   Physical Activity: Not on file   Stress: Not on file   Social Connections: Not on file   Intimate Partner Violence: Not on file   Housing Stability: Not on file       Allergies   Allergen Reactions    Liriano Flavor Other (see comments)    Shellfish Containing Products Anaphylaxis     Pumpkin seeds    Adhesive      Steri strips    Dougherty Other (see comments)    Banana Other (see comments)     also allergic to almonds, berries    Erythromycin     Erythromycin Base Other (see comments)     GI Upset  Other reaction(s): GI side effects    Iodine Other (see comments)     shell fish allergy    Latex Other (see comments)     patient not sure    Levonorgestrel-Ethinyl Estrad     Naprelan     Naproxen Other (see comments)     dysphagia throat burning    Naproxen Sodium      Other reaction(s): lump in throat    Penicillin G     Penicillin V Potassium Hives    Penicillins Hives    Pineapple Other (see comments)    Pumpkin Seed      Other reaction(s): throat gets tight    Shellfish Derived Hives       Current Outpatient Medications   Medication Sig Dispense Refill    cetirizine (ZyrTEC) 10 mg tablet Take 1 tablet by mouth daily.      cholecalciferol, vitamin D3, 25 mcg (1,000 unit) capsule take 1 tablet by oral route  every day      denosumab (PROLIA) 60 mg/mL syringe Inject 60 mg under the skin once.      folic acid/multivit,iron, (CENTRUM ORAL) Take by mouth.      gabapentin (NEURONTIN) 300 mg capsule Take 900 mg by mouth 3 (three) times a day.        hydrochlorothiazide (HYDRODIURIL) 25 mg tablet Take 1 tablet (25 mg total) by mouth once daily. 90 tablet 1    meloxicam (MOBIC) 7.5 mg tablet Take 7.5 mg by mouth as  "needed.        potassium chloride (KLOR-CON M) 10 mEq CR tablet TAKE 1 TABLET DAILY 90 tablet 1    rosuvastatin (CRESTOR) 5 mg tablet TAKE 1 TABLET DAILY (NEEDS MORE REFILLS) 90 tablet 3    b complex vitamins tablet Take 400 mg by mouth daily.      EPINEPHrine (EPIPEN) 0.3 mg/0.3 mL injection syringe Inject 0.3 mL (0.3 mg total) into the thigh as needed for anaphylaxis. 1 each 1    fluticasone propionate 50 mcg/actuation nasal spray Administer 1 spray into each nostril daily. 48 mL 1    pantoprazole (PROTONIX) 40 mg EC tablet TAKE 1 TABLET DAILY 90 tablet 3     No current facility-administered medications for this visit.       Visit Vitals  /70   Pulse 86   Resp 17   Ht 1.448 m (4' 9\")   Wt 48.5 kg (107 lb)   SpO2 99%   BMI 23.15 kg/m²         The following have been reviewed and updated as appropriate in this visit:   Allergies  Meds  Problems       Review of Systems   Constitutional: Negative for chills, fatigue and fever.   HENT: Positive for postnasal drip and trouble swallowing. Negative for ear pain and sore throat.    Eyes: Negative for pain and visual disturbance.   Respiratory: Negative for cough and shortness of breath.    Cardiovascular: Negative for chest pain, palpitations and leg swelling.   Gastrointestinal: Negative for abdominal pain, blood in stool, diarrhea, nausea and vomiting.   Genitourinary: Negative for dysuria and hematuria.   Musculoskeletal: Negative for back pain.   Skin: Negative for rash.   Allergic/Immunologic: Negative for environmental allergies.   Neurological: Negative for dizziness, numbness and headaches.   Hematological: Does not bruise/bleed easily.   Psychiatric/Behavioral: Negative for confusion.       Objective       Physical Exam  Vitals and nursing note reviewed.   Constitutional:       Appearance: She is well-developed and well-nourished.   HENT:      Head: Normocephalic and atraumatic.      Right Ear: External ear normal.      Left Ear: External ear " normal.      Nose: Nose normal.      Mouth/Throat:      Mouth: Oropharynx is clear and moist.   Eyes:      Extraocular Movements: EOM normal.      Conjunctiva/sclera: Conjunctivae normal.      Pupils: Pupils are equal, round, and reactive to light.   Neck:      Thyroid: No thyromegaly.   Cardiovascular:      Rate and Rhythm: Normal rate and regular rhythm.      Pulses: Intact distal pulses.      Heart sounds: Normal heart sounds.   Pulmonary:      Effort: Pulmonary effort is normal. No respiratory distress.      Breath sounds: Normal breath sounds.   Chest:      Chest wall: No tenderness.   Abdominal:      General: Bowel sounds are normal.      Palpations: Abdomen is soft.      Tenderness: There is no abdominal tenderness. There is no guarding.   Musculoskeletal:         General: No tenderness. Normal range of motion.      Cervical back: Normal range of motion and neck supple.   Lymphadenopathy:      Cervical: No cervical adenopathy.   Skin:     General: Skin is warm and dry.      Findings: No rash.   Neurological:      Mental Status: She is alert and oriented to person, place, and time.      Cranial Nerves: No cranial nerve deficit.      Sensory: No sensory deficit.   Psychiatric:         Mood and Affect: Mood and affect normal.         Behavior: Behavior normal.         Assessment/Plan     Hyperlipidemia  reveiwed labs  LDL is good at goal  Cont rosuvastatin 5 mg daily    Nasal congestion  Start flonase nasal spray daily  Cont cetrizine daily    Cricopharyngeal dysphagia  Follow up with Dr Mendez    Allergy to shellfish  Has a known history of allergy to shellfish and requesting rx for epipen  Rx sent    GERD (gastroesophageal reflux disease)  Uses protonix as needed        Maya Tesfaye MD  11/11/2022  7:11 PM

## 2022-11-11 ASSESSMENT — ENCOUNTER SYMPTOMS
PALPITATIONS: 0
BACK PAIN: 0
NUMBNESS: 0
SORE THROAT: 0
BLOOD IN STOOL: 0
HEADACHES: 0
EYE PAIN: 0
CONFUSION: 0
VOMITING: 0
SHORTNESS OF BREATH: 0
TROUBLE SWALLOWING: 1
ABDOMINAL PAIN: 0
COUGH: 0
DIARRHEA: 0
FATIGUE: 0
BRUISES/BLEEDS EASILY: 0
HEMATURIA: 0
DYSURIA: 0
CHILLS: 0
DIZZINESS: 0
NAUSEA: 0
FEVER: 0

## 2022-11-20 ENCOUNTER — NURSE TRIAGE (OUTPATIENT)
Dept: FAMILY MEDICINE | Facility: CLINIC | Age: 57
End: 2022-11-20
Payer: MEDICARE

## 2022-11-20 ENCOUNTER — HOSPITAL ENCOUNTER (OUTPATIENT)
Facility: CLINIC | Age: 57
Discharge: HOME | End: 2022-11-20
Attending: FAMILY MEDICINE
Payer: MEDICARE

## 2022-11-20 VITALS
DIASTOLIC BLOOD PRESSURE: 88 MMHG | TEMPERATURE: 98.5 F | SYSTOLIC BLOOD PRESSURE: 137 MMHG | RESPIRATION RATE: 14 BRPM | HEART RATE: 75 BPM | OXYGEN SATURATION: 98 %

## 2022-11-20 DIAGNOSIS — R09.82 POST-NASAL DRIP: Primary | ICD-10-CM

## 2022-11-20 LAB
BILIRUBIN, POC: NEGATIVE
BLOOD URINE, POC: NEGATIVE
CLARITY, POC: CLEAR
COLOR, POC: YELLOW
EXPIRATION DATE: NORMAL
GLUCOSE URINE, POC: NEGATIVE
KETONES, POC: NEGATIVE
LEUKOCYTE EST, POC: NEGATIVE
Lab: NORMAL
NITRITE, POC: NEGATIVE
PH, POC: 5
POCT MANUFACTURER: NORMAL
PROTEIN, POC: NEGATIVE
SPECIFIC GRAVITY, POC: 1.02

## 2022-11-20 PROCEDURE — 87086 URINE CULTURE/COLONY COUNT: CPT | Performed by: FAMILY MEDICINE

## 2022-11-20 PROCEDURE — 81002 URINALYSIS NONAUTO W/O SCOPE: CPT | Performed by: FAMILY MEDICINE

## 2022-11-20 PROCEDURE — 99213 OFFICE O/P EST LOW 20 MIN: CPT | Performed by: FAMILY MEDICINE

## 2022-11-20 RX ORDER — AZELASTINE 1 MG/ML
1 SPRAY, METERED NASAL 2 TIMES DAILY
Qty: 30 ML | Refills: 0 | Status: SHIPPED | OUTPATIENT
Start: 2022-11-20 | End: 2024-03-27

## 2022-11-20 NOTE — ED PROVIDER NOTES
"History  Chief Complaint   Patient presents with    UTI     Here with her mother     Symptoms started this morning  C/o a pain in her urethra area that was going into her lower belly, felt like a \"pull\", occurring intermittently this morning   She said it felt like how her normal UTIs are so she called her PCP requesting an antibx and they referred her to UC for UA/Ucx  No Dysuria, urgency, frequency   No blood   No fevers, chills, n, v, d   No flank pain.   + kidney stones   No abnormal vaginal discharge or lesions   Last UTI over 1 year year   Currently has no symptoms     Requesting COVID test today   C/o runny nose, stuffy nose, sore throat and throat clearing x 3+ weeks   Has a deviated septum  Is scheduled for a throat dilation with ENT soon   No sinus pain, sinus pressure, tooth pain   No fevers, chills, muscles aches   No chest pain or SOB  Fully vaccinated for COVID   No sick contacts   No rashes   Takes antihistamine and flonase daily but this does not relieve PND   States she does not have GERD - confirmed recently on colo/egd              Past Medical History:   Diagnosis Date    Allergic rhinitis     Cerebral cavernoma     parietal cavernoma    Cervical myelopathy (CMS/HCC)     Cervical radiculopathy     Cervicogenic headache     Closed fracture of sesamoid bone of foot     Cricopharyngeal dysphagia     Fibroids     Food allergy     Fractured toe     GERD (gastroesophageal reflux disease)     Lipid disorder     Nephrolithiasis     Osteoporosis        Past Surgical History:   Procedure Laterality Date    ANTERIOR FUSION CERVICAL SPINE      c6-7    BUNIONECTOMY      CRICOPHARYNGEAL MYOTOMY      KNEE SURGERY      LUMBAR FUSION      L5-S1    MYOMECTOMY      THROAT SURGERY      TIBIAL SESAMOID EXCISION         Family History   Problem Relation Age of Onset    Hyperlipidemia Biological Mother     Hypertension Biological Mother     Pulmonary fibrosis Biological Father     Heart " attack Biological Father     Colon cancer Maternal Grandfather     Breast cancer Mother's Sister     Ovarian cancer Father's Sister        Social History     Tobacco Use    Smoking status: Never    Smokeless tobacco: Never   Vaping Use    Vaping Use: Never used   Substance Use Topics    Alcohol use: No    Drug use: No       Review of Systems   All other systems reviewed and are negative.      Physical Exam  ED Triage Vitals [11/20/22 1530]   Temp Heart Rate Resp BP SpO2   36.9 °C (98.5 °F) 75 14 137/88 98 %      Temp src Heart Rate Source Patient Position BP Location FiO2 (%) (Set)   -- -- -- -- --       Physical Exam  Vitals and nursing note reviewed.   Constitutional:       General: She is not in acute distress.     Appearance: She is well-developed. She is not ill-appearing, toxic-appearing or diaphoretic.   HENT:      Head: Normocephalic and atraumatic.      Right Ear: Tympanic membrane, ear canal and external ear normal. There is no impacted cerumen.      Left Ear: Tympanic membrane, ear canal and external ear normal. There is no impacted cerumen.      Nose: No mucosal edema, congestion or rhinorrhea.      Right Turbinates: Not swollen.      Left Turbinates: Not swollen.      Right Sinus: No maxillary sinus tenderness or frontal sinus tenderness.      Left Sinus: No maxillary sinus tenderness or frontal sinus tenderness.      Mouth/Throat:      Mouth: Mucous membranes are moist.      Pharynx: No pharyngeal swelling, oropharyngeal exudate, posterior oropharyngeal erythema or uvula swelling.      Tonsils: No tonsillar exudate or tonsillar abscesses.      Comments: + PND  Eyes:      Conjunctiva/sclera: Conjunctivae normal.   Cardiovascular:      Rate and Rhythm: Normal rate and regular rhythm.      Heart sounds: Normal heart sounds.   Pulmonary:      Effort: Pulmonary effort is normal. No respiratory distress.      Breath sounds: Normal breath sounds. No wheezing, rhonchi or rales.   Abdominal:       General: Abdomen is flat. There is no distension.      Palpations: Abdomen is soft.      Tenderness: There is no abdominal tenderness. There is no right CVA tenderness, left CVA tenderness, guarding or rebound.   Musculoskeletal:      Cervical back: Neck supple. No tenderness.   Neurological:      Mental Status: She is alert and oriented to person, place, and time.           Procedures  Procedures    UC Course       MDM  Number of Diagnoses or Management Options  Post-nasal drip  Diagnosis management comments: # urinary symptoms  - UA unremarkable. Currently asympatomatic. Given benign UA and no symptoms at the moment, will hold off on antibx until cx returns. antibx pending cx/sensitivity results.   - Recc AZO TID prn for sx relief. Advised not to use > 3 days  - Recc Increase Hydration   - Discussed red flag s/s   - Advised patient to f/u with PCP for any new, worsening, persisting, or concerning symptoms     # sore throat / throat clearing  - suspect 2/2 chronic PND  - Continue current allergy tx  - Informed pt that given sx present for 3+ weeks, unlikely COVID and covid testing is not indicated at this time.   - Rx azelastine nasal spray for PND   - Recc f/u with ENT for ongoing management     Strict return precautions given. Patient was given opportunity to ask questions and all were answered. Patient verbalized understanding and agreeable with plan. See dispo for full care plan.                   Brynn Marx DO  11/20/22 2993

## 2022-11-20 NOTE — TELEPHONE ENCOUNTER
"Synopsis:  On call - patient called for urinary symptoms. It started last night. Burning with urination. Frequency. No blood in urine. No fever or chills. No nausea or vomiting. No abd pain or back pain. also had diarrhea. Last UTI a few years ago.     Recommend urgent care for urine check. Patient will go to local urgent care.     Disposition:  Go to Urgent Care Now  Care Advice:  Care Advice Given     No Care Advice given for this encounter.        Orders Placed This Encounter:  No orders of the defined types were placed in this encounter.      Answer Assessment - Initial Assessment Questions  1. SYMPTOM: \"What's the main symptom you're concerned about?\" (e.g., frequency, incontinence)      Burning, frequency    2. ONSET: \"When did the  ________  start?\"      Last night    3. PAIN: \"Is there any pain?\" If so, ask: \"How bad is it?\" (Scale: 1-10; mild, moderate, severe)      No abd pain    4. CAUSE: \"What do you think is causing the symptoms?\"      UTI?    5. OTHER SYMPTOMS: \"Do you have any other symptoms?\" (e.g., fever, flank pain, blood in urine, pain with urination)      No fever, abd pain or back pain    6. PREGNANCY: \"Is there any chance you are pregnant?\" \"When was your last menstrual period?\"      No    Protocols used: URINARY SYMPTOMS-ADULT-      "

## 2022-11-20 NOTE — DISCHARGE INSTRUCTIONS
Your urine dip came back NEGATIVE for an infection.     Your urine culture results should post to your portal within 72 hours.     In the interim, I recommend:   - AZO as needed for bladder symptoms   - Increase water intake  - AVOID alcohol and caffeine until symptoms improve    Post Nasal Drip   - continue antihistamine   - continue flonase 1 squirt twice daily   - START azelastine nasal spray 1 squirt twice daily    It is Strongly Recommended that you Follow up with your Primary care provider if your symptoms do not improve within 72 Hours.

## 2022-11-21 LAB — BACTERIA UR CULT: NORMAL

## 2022-12-27 ENCOUNTER — TELEPHONE (OUTPATIENT)
Dept: INTERNAL MEDICINE | Facility: CLINIC | Age: 57
End: 2022-12-27
Payer: MEDICARE

## 2022-12-27 DIAGNOSIS — R10.9 FLANK PAIN: Primary | ICD-10-CM

## 2022-12-27 NOTE — TELEPHONE ENCOUNTER
Spoke with the patient and she told me that she has had ongoing discomfort on her left back and sometimes on her right flank we discussed that she will get a CT scan to rule out a kidney stone as she does have  known nonobstructing left renal calculi shown on the ultrasound in the past   advised her to drink lots of fluids and we will call her back with results

## 2022-12-30 ENCOUNTER — HOSPITAL ENCOUNTER (OUTPATIENT)
Dept: RADIOLOGY | Facility: CLINIC | Age: 57
Discharge: HOME | End: 2022-12-30
Attending: INTERNAL MEDICINE
Payer: MEDICARE

## 2022-12-30 DIAGNOSIS — R10.9 FLANK PAIN: ICD-10-CM

## 2022-12-30 PROCEDURE — 74176 CT ABD & PELVIS W/O CONTRAST: CPT | Mod: ME

## 2023-01-02 ENCOUNTER — TELEPHONE (OUTPATIENT)
Dept: INTERNAL MEDICINE | Facility: CLINIC | Age: 58
End: 2023-01-02
Payer: MEDICARE

## 2023-01-02 NOTE — TELEPHONE ENCOUNTER
Spoke with the patient and reviewed results  She has ongoing pain on the left side  We discussed she will consult urology and gave her info for Dr Weaver or she will see urology at [facility]  She was advised to go to ER for worsening pain

## 2023-01-03 ENCOUNTER — APPOINTMENT (EMERGENCY)
Dept: RADIOLOGY | Facility: HOSPITAL | Age: 58
End: 2023-01-03
Payer: MEDICARE

## 2023-01-03 ENCOUNTER — HOSPITAL ENCOUNTER (EMERGENCY)
Facility: HOSPITAL | Age: 58
Discharge: HOME | End: 2023-01-03
Attending: STUDENT IN AN ORGANIZED HEALTH CARE EDUCATION/TRAINING PROGRAM
Payer: MEDICARE

## 2023-01-03 VITALS
TEMPERATURE: 98.2 F | HEART RATE: 80 BPM | OXYGEN SATURATION: 99 % | RESPIRATION RATE: 16 BRPM | DIASTOLIC BLOOD PRESSURE: 74 MMHG | SYSTOLIC BLOOD PRESSURE: 120 MMHG

## 2023-01-03 DIAGNOSIS — N28.1 BILATERAL RENAL CYSTS: ICD-10-CM

## 2023-01-03 DIAGNOSIS — R10.9 LEFT FLANK PAIN: Primary | ICD-10-CM

## 2023-01-03 LAB
ALBUMIN SERPL-MCNC: 4 G/DL (ref 3.4–5)
ALP SERPL-CCNC: 66 IU/L (ref 35–126)
ALT SERPL-CCNC: 19 IU/L (ref 11–54)
ANION GAP SERPL CALC-SCNC: 7 MEQ/L (ref 3–15)
AST SERPL-CCNC: 34 IU/L (ref 15–41)
BASOPHILS # BLD: 0.08 K/UL (ref 0.01–0.1)
BASOPHILS NFR BLD: 0.9 %
BILIRUB SERPL-MCNC: 0.8 MG/DL (ref 0.3–1.2)
BILIRUB UR QL STRIP.AUTO: NEGATIVE MG/DL
BUN SERPL-MCNC: 22 MG/DL (ref 8–20)
CALCIUM SERPL-MCNC: 9.8 MG/DL (ref 8.9–10.3)
CHLORIDE SERPL-SCNC: 103 MEQ/L (ref 98–109)
CLARITY UR REFRACT.AUTO: CLEAR
CO2 SERPL-SCNC: 28 MEQ/L (ref 22–32)
COLOR UR AUTO: COLORLESS
CREAT SERPL-MCNC: 0.9 MG/DL (ref 0.6–1.1)
DIFFERENTIAL METHOD BLD: ABNORMAL
EOSINOPHIL # BLD: 0.35 K/UL (ref 0.04–0.36)
EOSINOPHIL NFR BLD: 3.8 %
ERYTHROCYTE [DISTWIDTH] IN BLOOD BY AUTOMATED COUNT: 12.8 % (ref 11.7–14.4)
GFR SERPL CREATININE-BSD FRML MDRD: >60 ML/MIN/1.73M*2
GLUCOSE SERPL-MCNC: 112 MG/DL (ref 70–99)
GLUCOSE UR STRIP.AUTO-MCNC: NEGATIVE MG/DL
HCT VFR BLDCO AUTO: 42.5 % (ref 35–45)
HGB BLD-MCNC: 14.1 G/DL (ref 11.8–15.7)
HGB UR QL STRIP.AUTO: NEGATIVE
IMM GRANULOCYTES # BLD AUTO: 0.03 K/UL (ref 0–0.08)
IMM GRANULOCYTES NFR BLD AUTO: 0.3 %
KETONES UR STRIP.AUTO-MCNC: NEGATIVE MG/DL
LEUKOCYTE ESTERASE UR QL STRIP.AUTO: NEGATIVE
LIPASE SERPL-CCNC: 44 U/L (ref 20–51)
LYMPHOCYTES # BLD: 3.9 K/UL (ref 1.2–3.5)
LYMPHOCYTES NFR BLD: 41.9 %
MCH RBC QN AUTO: 32.8 PG (ref 28–33.2)
MCHC RBC AUTO-ENTMCNC: 33.2 G/DL (ref 32.2–35.5)
MCV RBC AUTO: 98.8 FL (ref 83–98)
MONOCYTES # BLD: 0.76 K/UL (ref 0.28–0.8)
MONOCYTES NFR BLD: 8.2 %
NEUTROPHILS # BLD: 4.19 K/UL (ref 1.7–7)
NEUTS SEG NFR BLD: 44.9 %
NITRITE UR QL STRIP.AUTO: NEGATIVE
NRBC BLD-RTO: 0 %
PDW BLD AUTO: 11 FL (ref 9.4–12.3)
PH UR STRIP.AUTO: 6.5 [PH]
PLATELET # BLD AUTO: 344 K/UL (ref 150–369)
POTASSIUM SERPL-SCNC: 4.3 MEQ/L (ref 3.6–5.1)
PROT SERPL-MCNC: 7 G/DL (ref 6–8.2)
PROT UR QL STRIP.AUTO: NEGATIVE
RBC # BLD AUTO: 4.3 M/UL (ref 3.93–5.22)
SODIUM SERPL-SCNC: 138 MEQ/L (ref 136–144)
SP GR UR REFRACT.AUTO: 1.01
UROBILINOGEN UR STRIP-ACNC: 0.2 EU/DL
WBC # BLD AUTO: 9.31 K/UL (ref 3.8–10.5)

## 2023-01-03 PROCEDURE — 85025 COMPLETE CBC W/AUTO DIFF WBC: CPT

## 2023-01-03 PROCEDURE — 25800000 HC PHARMACY IV SOLUTIONS: Performed by: PHYSICIAN ASSISTANT

## 2023-01-03 PROCEDURE — 99284 EMERGENCY DEPT VISIT MOD MDM: CPT | Mod: 25

## 2023-01-03 PROCEDURE — 96360 HYDRATION IV INFUSION INIT: CPT | Mod: 59

## 2023-01-03 PROCEDURE — 63700000 HC SELF-ADMINISTRABLE DRUG: Performed by: STUDENT IN AN ORGANIZED HEALTH CARE EDUCATION/TRAINING PROGRAM

## 2023-01-03 PROCEDURE — 36415 COLL VENOUS BLD VENIPUNCTURE: CPT

## 2023-01-03 PROCEDURE — 81003 URINALYSIS AUTO W/O SCOPE: CPT | Performed by: STUDENT IN AN ORGANIZED HEALTH CARE EDUCATION/TRAINING PROGRAM

## 2023-01-03 PROCEDURE — 63600105 HC IODINE BASED CONTRAST: Performed by: PHYSICIAN ASSISTANT

## 2023-01-03 PROCEDURE — 81003 URINALYSIS AUTO W/O SCOPE: CPT

## 2023-01-03 PROCEDURE — 80053 COMPREHEN METABOLIC PANEL: CPT | Performed by: STUDENT IN AN ORGANIZED HEALTH CARE EDUCATION/TRAINING PROGRAM

## 2023-01-03 PROCEDURE — 85025 COMPLETE CBC W/AUTO DIFF WBC: CPT | Performed by: STUDENT IN AN ORGANIZED HEALTH CARE EDUCATION/TRAINING PROGRAM

## 2023-01-03 PROCEDURE — 3E0337Z INTRODUCTION OF ELECTROLYTIC AND WATER BALANCE SUBSTANCE INTO PERIPHERAL VEIN, PERCUTANEOUS APPROACH: ICD-10-PCS | Performed by: STUDENT IN AN ORGANIZED HEALTH CARE EDUCATION/TRAINING PROGRAM

## 2023-01-03 PROCEDURE — 80053 COMPREHEN METABOLIC PANEL: CPT

## 2023-01-03 PROCEDURE — 74177 CT ABD & PELVIS W/CONTRAST: CPT | Mod: ME

## 2023-01-03 PROCEDURE — 83690 ASSAY OF LIPASE: CPT | Performed by: PHYSICIAN ASSISTANT

## 2023-01-03 RX ORDER — IBUPROFEN 600 MG/1
600 TABLET ORAL ONCE
Status: COMPLETED | OUTPATIENT
Start: 2023-01-03 | End: 2023-01-03

## 2023-01-03 RX ADMIN — SODIUM CHLORIDE 500 ML: 9 INJECTION, SOLUTION INTRAVENOUS at 06:42

## 2023-01-03 RX ADMIN — IOHEXOL 100 ML: 350 INJECTION, SOLUTION INTRAVENOUS at 07:05

## 2023-01-03 RX ADMIN — IBUPROFEN 600 MG: 600 TABLET, FILM COATED ORAL at 08:01

## 2023-01-03 ASSESSMENT — ENCOUNTER SYMPTOMS
BLOOD IN STOOL: 0
SHORTNESS OF BREATH: 0
DYSURIA: 1
ABDOMINAL PAIN: 1
DIARRHEA: 0
NAUSEA: 1
FEVER: 0
VOMITING: 0
HEMATURIA: 1
FLANK PAIN: 1
BACK PAIN: 1

## 2023-01-03 NOTE — DISCHARGE INSTRUCTIONS
Return to the ED for any increased pain, fevers, increased vomiting or diarrhea, unable to eat or drink, unable to urinate, or any worsening of symptoms. Follow up with your healthcare provider for re-evaluation.

## 2023-01-03 NOTE — ED PROVIDER NOTES
"Emergency Medicine Note  HPI   HISTORY OF PRESENT ILLNESS       History provided by:  Patient  Abdominal Pain  Pain location:  L flank  Pain radiates to:  LUQ and LLQ  Pain severity:  Moderate  Duration:  10 days  Timing:  Constant  Progression:  Worsening  Chronicity: remote h/o kidney stones but had outpatient CT scan through PCP which was negative for obstructing stone.  Associated symptoms: dysuria (x6 weeks; negative UA at urgent care per patient), hematuria (per patient \"a few days ago\") and nausea    Associated symptoms: no chest pain, no diarrhea, no fever, no shortness of breath and no vomiting          Patient History   PAST HISTORY     Reviewed from Nursing Triage:       Past Medical History:   Diagnosis Date   • Allergic rhinitis    • Cerebral cavernoma     parietal cavernoma   • Cervical myelopathy (CMS/HCC)    • Cervical radiculopathy    • Cervicogenic headache    • Closed fracture of sesamoid bone of foot    • Cricopharyngeal dysphagia    • Fibroids    • Food allergy    • Fractured toe    • GERD (gastroesophageal reflux disease)    • Lipid disorder    • Nephrolithiasis    • Osteoporosis        Past Surgical History:   Procedure Laterality Date   • ANTERIOR FUSION CERVICAL SPINE      c6-7   • BUNIONECTOMY     • CRICOPHARYNGEAL MYOTOMY     • KNEE SURGERY     • LUMBAR FUSION      L5-S1   • MYOMECTOMY     • THROAT SURGERY     • TIBIAL SESAMOID EXCISION         Family History   Problem Relation Age of Onset   • Hyperlipidemia Biological Mother    • Hypertension Biological Mother    • Pulmonary fibrosis Biological Father    • Heart attack Biological Father    • Colon cancer Maternal Grandfather    • Breast cancer Mother's Sister    • Ovarian cancer Father's Sister        Social History     Tobacco Use   • Smoking status: Never   • Smokeless tobacco: Never   Vaping Use   • Vaping Use: Never used   Substance Use Topics   • Alcohol use: No   • Drug use: No         Review of Systems   REVIEW OF SYSTEMS " "    Review of Systems   Constitutional: Negative for fever.   Respiratory: Negative for shortness of breath.    Cardiovascular: Negative for chest pain.   Gastrointestinal: Positive for abdominal pain and nausea. Negative for blood in stool, diarrhea and vomiting.   Genitourinary: Positive for dysuria (x6 weeks; negative UA at urgent care per patient), flank pain and hematuria (per patient \"a few days ago\").   Musculoskeletal: Positive for back pain (worse with ambulating, denies radiating to legs).         VITALS     ED Vitals    Date/Time Temp Pulse Resp BP SpO2 [facility]   01/03/23 0811 36.8 °C (98.2 °F) 80 16 120/74 99 % JMB   01/03/23 0637 36.7 °C (98 °F) 87 18 125/82 98 % EMU   01/03/23 0235 36.3 °C (97.4 °F) 96 18 176/110 99 % AC        Pulse Ox %: 99 % (01/03/23 0625)  Pulse Ox Interpretation: Normal (01/03/23 0625)           Physical Exam   PHYSICAL EXAM     Physical Exam  Vitals and nursing note reviewed.   Constitutional:       General: She is not in acute distress.     Appearance: Normal appearance.   HENT:      Head: Atraumatic.   Cardiovascular:      Rate and Rhythm: Normal rate.   Pulmonary:      Effort: Pulmonary effort is normal.   Abdominal:      Palpations: Abdomen is soft.      Tenderness: There is abdominal tenderness (minimal L sided TTP). There is no guarding or rebound.   Musculoskeletal:      Cervical back: Normal range of motion and neck supple.      Thoracic back: No bony tenderness. Normal range of motion.      Lumbar back: No bony tenderness. Normal range of motion.      Left hip: No bony tenderness. Normal range of motion.      Comments: Pain seems worse with ROM of torso and L hip   Skin:     General: Skin is warm and dry.      Findings: No rash.   Neurological:      General: No focal deficit present.      Mental Status: She is alert and oriented to person, place, and time.      Gait: Gait normal.           PROCEDURES     Procedures     DATA     Results     Procedure Component Value Units " Date/Time    Lipase [[id number]]  (Normal) Collected: 01/03/23 0241    Specimen: Blood, Venous Updated: 01/03/23 0655     Lipase 44 U/L     Comprehensive metabolic panel [[id number]]  (Abnormal) Collected: 01/03/23 0241    Specimen: Blood, Venous Updated: 01/03/23 0311     Sodium 138 mEQ/L      Potassium 4.3 mEQ/L      Comment: Results obtained on plasma. Plasma Potassium values may be up to 0.4 mEQ/L less than serum values. The differences may be greater for patients with high platelet or white cell counts.  SLIGHT HEMOLYSIS, RESULT MAY BE INCREASED.        Chloride 103 mEQ/L      CO2 28 mEQ/L      BUN 22 mg/dL      Creatinine 0.9 mg/dL      Glucose 112 mg/dL      Calcium 9.8 mg/dL      AST (SGOT) 34 IU/L      Comment: SLIGHT HEMOLYSIS, RESULT MAY BE INCREASED.        ALT (SGPT) 19 IU/L      Comment: SLIGHT HEMOLYSIS, RESULT MAY BE INCREASED.        Alkaline Phosphatase 66 IU/L      Total Protein 7.0 g/dL      Comment: Test performed on plasma which typically contains approximately 0.4 g/dL more protein than serum.        Albumin 4.0 g/dL      Bilirubin, Total 0.8 mg/dL      Comment: SLIGHT HEMOLYSIS, RESULT MAY BE INCREASED.        eGFR >60.0 mL/min/1.73m*2      Anion Gap 7 mEQ/L     Urinalysis with Reflex Culture [[id number]]  (Normal) Collected: 01/03/23 0254    Specimen: Urine, Clean Catch Updated: 01/03/23 0302    Narrative:      The following orders were created for panel order Urinalysis with Reflex Culture.  Procedure                               Abnormality         Status                     ---------                               -----------         ------                     UA Reflex to Culture (Ma...[[id number]]  Normal              Final result                 Please view results for these tests on the individual orders.    UA Reflex to Culture (Macroscopic) [[id number]]  (Normal) Collected: 01/03/23 0254    Specimen: Urine, Clean Catch Updated: 01/03/23 0302     Color, Urine Colorless     Clarity, Urine  Clear     Specific Gravity, Urine 1.010     pH, Urine 6.5     Leukocyte Esterase Negative     Comment: Results can be falsely negative due to high specific gravity, some antibiotics, glucose >3 g/dl, or WBC other than neutrophils.        Nitrite, Urine Negative     Protein, Urine Negative     Glucose, Urine Negative mg/dL      Ketones, Urine Negative mg/dL      Urobilinogen, Urine 0.2 EU/dL      Bilirubin, Urine Negative mg/dL      Blood, Urine Negative     Comment: The sensitivity of the occult blood test is equivalent to approximately 4 intact RBC/HPF.       CBC and differential [[id number]]  (Abnormal) Collected: 01/03/23 0241    Specimen: Blood, Venous Updated: 01/03/23 0253     WBC 9.31 K/uL      RBC 4.30 M/uL      Hemoglobin 14.1 g/dL      Hematocrit 42.5 %      MCV 98.8 fL      MCH 32.8 pg      MCHC 33.2 g/dL      RDW 12.8 %      Platelets 344 K/uL      MPV 11.0 fL      Differential Type Auto     nRBC 0.0 %      Immature Granulocytes 0.3 %      Neutrophils 44.9 %      Lymphocytes 41.9 %      Monocytes 8.2 %      Eosinophils 3.8 %      Basophils 0.9 %      Immature Granulocytes, Absolute 0.03 K/uL      Neutrophils, Absolute 4.19 K/uL      Lymphocytes, Absolute 3.90 K/uL      Monocytes, Absolute 0.76 K/uL      Eosinophils, Absolute 0.35 K/uL      Basophils, Absolute 0.08 K/uL           Imaging Results          CT ABDOMEN PELVIS WITH IV CONTRAST (Final result)  Result time 01/03/23 07:36:36    Final result                 Impression:    IMPRESSION:  No evidence of acute inflammation or bowel obstruction.                     Narrative:    CLINICAL HISTORY: LLQ abdominal pain  LUQ abdominal pain    COMPARISON: CT December 30, 2022, January 21, 2019    COMMENT:    TECHNIQUE: CT of the abdomen and pelvis was performed after the uneventful  administration of intravenous contrast with the patient in the supine position.  Images reconstructed/reformatted in the axial, coronal and  sagittal planes.    CT DOSE:  One or  more dose reduction techniques (e.g. automated exposure  control, adjustment of the mA and/or kV according to patient size, use of  iterative reconstruction technique) utilized for this examination.  ADMINISTERED CONTRAST:  100mL of iohexoL (OMNIPAQUE 350) 350 mg iodine/mL solution 100 mL    LOWER CHEST: Within normal limits.    BONES: Transitional lumbosacral vertebral body labeled as S1 for purposes of  this report. As such, there is L5-S1 posterior spinal fusion changes and S1  laminectomy changes again seen. There is grade 1 anterolisthesis of on L5 on S1,  unchanged.    LIVER: Within normal limits.  BILE DUCTS: Normal caliber.  GALLBLADDER: Normal  PANCREAS: Within normal limits.  SPLEEN: Within normal limits.  ADRENALS: Within normal limits.  KIDNEYS/URETERS/BLADDER: Kidneys enhance and excrete contrast symmetrically.  Right kidney:  Right renal low-attenuation lesions without simple fluid characteristics on the  prior nonenhanced CT examination. Right renal calculi within the lower pole  again seen measuring up to 0.3 cm, unchanged. No right hydroureteronephrosis.  Left kidney:  No hydroureteronephrosis. Parapelvic cyst causing mild mass effect on the renal  pelvis without evidence of pelvic caliectasis/hydronephrosis.  Urinary bladder: Within normal limits    REPRODUCTIVE ORGANS: Myomatous, anteverted uterus.    BOWEL: Normal terminal ileum. Normal appendix. Small to moderate colonic stool  burden. Normal bowel caliber.  No ascites or free air, no fluid collection  VESSELS: Mild vascular calcifications. Patent celiac artery, SMA and COCO. Renal  arteries are patent. Circumaortic left renal vein normal variant.  LYMPH NODES: within normal limits.  ABDOMINAL WALL: Within normal limits.                                No orders to display       Scoring tools                                  ED Course & MDM   MDM / ED COURSE / CLINICAL IMPRESSION / DISPO     MDM    ED Course as of 01/03/23 1112   Tue Jan 03, 2023    0635 Pt presents today for evaluation of 10 days of radiating L flank pain. Pt had CT scan abd/pelvis without contrast which was NAD except b/l renal cysts. Pt presents today for worsening pain. Labs, UA ordered through triage protocol reviewed which are reassuring  Given mild reproducible tenderness with palpation of L abdomen; plan for CT scan abd/pelvis with IV contrast  Pt declines need for analgesia  [NH]   0739 CT a/p w/ contrast radiology IMPRESSION: No evidence of acute inflammation or bowel obstruction. [NS]   0741 CT scan abd/pelvis NAD. Pt already aware of b/l renal cysts that will require outpatient f/u. Suspect MSK back pain. No evidence of high grade stenosis on exam to warrant further imaging. Recommended PCP f/u Pt requesting Dr Weaver from urology for f/u as well; will leave contact information [NH]   4649 Pt seen and evaluated. HPI reviewed. Stable for dc home at this time.  [NS]      ED Course User Index  [NH] Emily Freedman PA C  [NS] Chago Wiggins E, DO     Clinical Impression      Left flank pain   Bilateral renal cysts     _________________     ED Disposition   Discharge                   Emily Freedman PA C  01/03/23 1112

## 2023-01-03 NOTE — ED ATTESTATION NOTE
I have personally seen and examined Rajwinder Wynne.  I personally performed the key components of the encounter and provided a substantive portion of the care and medical decision making for this patient.    I reviewed and agree with physician assistant / nurse practitioner’s assessment and plan of care, with any exceptions as documented below.      My focused history, examination, assessment, and plan of care of Rajwinder Wynne is as follows:      Patient Vitals for the past 72 hrs:   BP Temp Temp src Pulse Resp SpO2   01/03/23 0637 125/82 36.7 °C (98 °F) Tympanic 87 18 98 %   01/03/23 0235 (!) 176/110 36.3 °C (97.4 °F) -- 96 18 99 %       Brief History:  Onset of dysuria/low abd pain about six weeks ago; continued and currently most significant pain to left flank. Pt notes worse with walking and bending over. Had seen Dr. Tesfaye (PCP) for this and a recent CT was performed noting renal cyst and kidney stones. Comes to ED this morning due to continued pain.     Focused Physical Exam:  Vss, nad, nontoxic, head at/nc, normal speech, no resp distress, normal skin tone, coordinated movement, subjective and reproducible left CVA/flank discomfort, no mass palpated, abd otherwise soft nt/nd, reported worse pain with movement but no focal deficit observed.     Assessment / Plan / MDM:  Left flank pain, known renal cyst, known kidney stone. Labs, UA and CT performed. Differential includes muscular pain, kidney stone, mass/obstruction, infection, inflammation, ischemia. CT and labs indicated no acute findings; identified known kidney stone and cyst. Pt with reproducible left flank pain with movement. Declined initial analgesia in ED. At time of discharge pt now consent for Ibuprofen. Pt desires to f/u with Dr. Weaver (urology); contact provided. Pt also requests CD of CT scan in case she decides to f/u with outside hospital; will provide. Pt stable for dc home.     I was physically present for the key/critical portions of  the following procedures: labs, ct imaging, UA, physical exam.          Chago Wiggins DO  01/03/23 0803

## 2023-01-06 ENCOUNTER — TRANSCRIBE ORDERS (OUTPATIENT)
Dept: SCHEDULING | Age: 58
End: 2023-01-06

## 2023-01-06 ENCOUNTER — TELEPHONE (OUTPATIENT)
Dept: INTERNAL MEDICINE | Facility: CLINIC | Age: 58
End: 2023-01-06
Payer: MEDICARE

## 2023-01-06 DIAGNOSIS — N28.1 CYST OF KIDNEY, ACQUIRED: ICD-10-CM

## 2023-01-06 DIAGNOSIS — N20.0 CALCULUS OF KIDNEY: Primary | ICD-10-CM

## 2023-01-06 NOTE — TELEPHONE ENCOUNTER
Left a message for the patient that I was returning her phone call from earlier today and that I would advise her to get the study that is scheduled for Tuesday before deciding on further imaging and that she can call the office on Monday

## 2023-01-09 NOTE — TELEPHONE ENCOUNTER
Spoke with the patient and she is scheduled for renal scan on thursday  She will also consult Dr Petey cotton at [facility] in few weeks  discussed hydrate well and keep me posted

## 2023-01-12 ENCOUNTER — HOSPITAL ENCOUNTER (OUTPATIENT)
Dept: RADIOLOGY | Facility: HOSPITAL | Age: 58
Setting detail: NUCLEAR MEDICINE
Discharge: HOME | End: 2023-01-12
Attending: UROLOGY
Payer: MEDICARE

## 2023-01-12 DIAGNOSIS — N20.0 CALCULUS OF KIDNEY: ICD-10-CM

## 2023-01-12 DIAGNOSIS — N28.1 CYST OF KIDNEY, ACQUIRED: ICD-10-CM

## 2023-01-12 PROCEDURE — 63600000 HC DRUGS/DETAIL CODE: Performed by: UROLOGY

## 2023-01-12 PROCEDURE — 78709 K FLOW/FUNCT IMAGE MULTIPLE: CPT

## 2023-01-12 PROCEDURE — A9562 TC99M MERTIATIDE: HCPCS | Performed by: UROLOGY

## 2023-01-12 RX ORDER — FUROSEMIDE 10 MG/ML
40 INJECTION INTRAMUSCULAR; INTRAVENOUS ONCE
Status: COMPLETED | OUTPATIENT
Start: 2023-01-12 | End: 2023-01-12

## 2023-01-12 RX ADMIN — FUROSEMIDE 40 MG: 10 INJECTION, SOLUTION INTRAMUSCULAR; INTRAVENOUS at 08:19

## 2023-01-12 RX ADMIN — MERTIATIDE 9.8 MILLICURIE: 1 INJECTION, POWDER, LYOPHILIZED, FOR SOLUTION INTRAVENOUS at 08:46

## 2023-01-13 ENCOUNTER — TRANSCRIBE ORDERS (OUTPATIENT)
Dept: SCHEDULING | Age: 58
End: 2023-01-13

## 2023-01-13 DIAGNOSIS — M54.50 LOW BACK PAIN: Primary | ICD-10-CM

## 2023-02-20 ENCOUNTER — TRANSCRIBE ORDERS (OUTPATIENT)
Dept: SCHEDULING | Age: 58
End: 2023-02-20

## 2023-02-20 DIAGNOSIS — M81.0 AGE-RELATED OSTEOPOROSIS WITHOUT CURRENT PATHOLOGICAL FRACTURE: Primary | ICD-10-CM

## 2023-02-20 RX ORDER — FLUTICASONE PROPIONATE 50 MCG
SPRAY, SUSPENSION (ML) NASAL
Qty: 48 G | Refills: 2 | Status: SHIPPED | OUTPATIENT
Start: 2023-02-20 | End: 2024-06-14

## 2023-02-27 ENCOUNTER — HOSPITAL ENCOUNTER (OUTPATIENT)
Dept: RADIOLOGY | Facility: CLINIC | Age: 58
Discharge: HOME | End: 2023-02-27
Attending: INTERNAL MEDICINE
Payer: MEDICARE

## 2023-02-27 DIAGNOSIS — M81.0 AGE-RELATED OSTEOPOROSIS WITHOUT CURRENT PATHOLOGICAL FRACTURE: ICD-10-CM

## 2023-02-27 PROCEDURE — 77080 DXA BONE DENSITY AXIAL: CPT

## 2023-05-08 ENCOUNTER — TELEPHONE (OUTPATIENT)
Dept: INTERNAL MEDICINE | Facility: CLINIC | Age: 58
End: 2023-05-08
Payer: MEDICARE

## 2023-05-08 ENCOUNTER — APPOINTMENT (OUTPATIENT)
Dept: LAB | Facility: CLINIC | Age: 58
End: 2023-05-08
Attending: INTERNAL MEDICINE
Payer: MEDICARE

## 2023-05-08 DIAGNOSIS — E55.9 VITAMIN D DEFICIENCY: ICD-10-CM

## 2023-05-08 DIAGNOSIS — E78.5 HYPERLIPIDEMIA, UNSPECIFIED HYPERLIPIDEMIA TYPE: Primary | ICD-10-CM

## 2023-05-08 DIAGNOSIS — R73.9 HYPERGLYCEMIA: ICD-10-CM

## 2023-05-08 DIAGNOSIS — E78.5 HYPERLIPIDEMIA, UNSPECIFIED HYPERLIPIDEMIA TYPE: ICD-10-CM

## 2023-05-08 LAB
25(OH)D3 SERPL-MCNC: 47 NG/ML (ref 30–100)
ALBUMIN SERPL-MCNC: 4.2 G/DL (ref 3.4–5)
ALP SERPL-CCNC: 48 IU/L (ref 35–126)
ALT SERPL-CCNC: 14 IU/L (ref 11–54)
ANION GAP SERPL CALC-SCNC: 8 MEQ/L (ref 3–15)
AST SERPL-CCNC: 19 IU/L (ref 15–41)
BASOPHILS # BLD: 0.06 K/UL (ref 0.01–0.1)
BASOPHILS NFR BLD: 1 %
BILIRUB SERPL-MCNC: 1 MG/DL (ref 0.3–1.2)
BUN SERPL-MCNC: 17 MG/DL (ref 8–20)
CALCIUM SERPL-MCNC: 9.8 MG/DL (ref 8.9–10.3)
CHLORIDE SERPL-SCNC: 102 MEQ/L (ref 98–109)
CHOLEST SERPL-MCNC: 193 MG/DL
CO2 SERPL-SCNC: 29 MEQ/L (ref 22–32)
CREAT SERPL-MCNC: 0.6 MG/DL (ref 0.6–1.1)
CRP SERPL-MCNC: <6 MG/L
DIFFERENTIAL METHOD BLD: ABNORMAL
EOSINOPHIL # BLD: 0.24 K/UL (ref 0.04–0.36)
EOSINOPHIL NFR BLD: 4.1 %
ERYTHROCYTE [DISTWIDTH] IN BLOOD BY AUTOMATED COUNT: 13.6 % (ref 11.7–14.4)
EST. AVERAGE GLUCOSE BLD GHB EST-MCNC: 105 MG/DL
GFR SERPL CREATININE-BSD FRML MDRD: >60 ML/MIN/1.73M*2
GLUCOSE SERPL-MCNC: 89 MG/DL (ref 70–99)
HBA1C MFR BLD: 5.3 %
HCT VFR BLDCO AUTO: 44.5 % (ref 35–45)
HDLC SERPL-MCNC: 77 MG/DL
HDLC SERPL: 2.5 {RATIO}
HGB BLD-MCNC: 14.5 G/DL (ref 11.8–15.7)
IMM GRANULOCYTES # BLD AUTO: 0.01 K/UL (ref 0–0.08)
IMM GRANULOCYTES NFR BLD AUTO: 0.2 %
LDLC SERPL CALC-MCNC: 101 MG/DL
LYMPHOCYTES # BLD: 2.33 K/UL (ref 1.2–3.5)
LYMPHOCYTES NFR BLD: 39.6 %
MCH RBC QN AUTO: 33.9 PG (ref 28–33.2)
MCHC RBC AUTO-ENTMCNC: 32.6 G/DL (ref 32.2–35.5)
MCV RBC AUTO: 104 FL (ref 83–98)
MONOCYTES # BLD: 0.51 K/UL (ref 0.28–0.8)
MONOCYTES NFR BLD: 8.7 %
NEUTROPHILS # BLD: 2.73 K/UL (ref 1.7–7)
NEUTS SEG NFR BLD: 46.4 %
NONHDLC SERPL-MCNC: 116 MG/DL
NRBC BLD-RTO: 0 %
PDW BLD AUTO: 12.1 FL (ref 9.4–12.3)
PLATELET # BLD AUTO: 337 K/UL (ref 150–369)
POTASSIUM SERPL-SCNC: 4 MEQ/L (ref 3.6–5.1)
PROT SERPL-MCNC: 6.4 G/DL (ref 6–8.2)
RBC # BLD AUTO: 4.28 M/UL (ref 3.93–5.22)
SODIUM SERPL-SCNC: 139 MEQ/L (ref 136–144)
TRIGL SERPL-MCNC: 73 MG/DL (ref 30–149)
TSH SERPL DL<=0.05 MIU/L-ACNC: 2.73 MIU/L (ref 0.34–5.6)
WBC # BLD AUTO: 5.88 K/UL (ref 3.8–10.5)

## 2023-05-08 PROCEDURE — 80061 LIPID PANEL: CPT

## 2023-05-08 PROCEDURE — 80053 COMPREHEN METABOLIC PANEL: CPT

## 2023-05-08 PROCEDURE — 85025 COMPLETE CBC W/AUTO DIFF WBC: CPT

## 2023-05-08 PROCEDURE — 82306 VITAMIN D 25 HYDROXY: CPT

## 2023-05-08 PROCEDURE — 83036 HEMOGLOBIN GLYCOSYLATED A1C: CPT

## 2023-05-08 PROCEDURE — 86140 C-REACTIVE PROTEIN: CPT

## 2023-05-08 PROCEDURE — 36415 COLL VENOUS BLD VENIPUNCTURE: CPT

## 2023-05-08 PROCEDURE — 84443 ASSAY THYROID STIM HORMONE: CPT

## 2023-05-09 ENCOUNTER — TRANSCRIBE ORDERS (OUTPATIENT)
Dept: SCHEDULING | Age: 58
End: 2023-05-09

## 2023-05-09 DIAGNOSIS — Z01.812 PRE-PROCEDURE LAB EXAM: ICD-10-CM

## 2023-05-09 DIAGNOSIS — N20.0 CALCULUS OF KIDNEY: Primary | ICD-10-CM

## 2023-05-12 ENCOUNTER — OFFICE VISIT (OUTPATIENT)
Dept: INTERNAL MEDICINE | Facility: CLINIC | Age: 58
End: 2023-05-12
Payer: MEDICARE

## 2023-05-12 VITALS
HEART RATE: 80 BPM | RESPIRATION RATE: 17 BRPM | HEIGHT: 57 IN | BODY MASS INDEX: 21.79 KG/M2 | WEIGHT: 101 LBS | OXYGEN SATURATION: 97 %

## 2023-05-12 DIAGNOSIS — E78.5 HYPERLIPIDEMIA, UNSPECIFIED HYPERLIPIDEMIA TYPE: ICD-10-CM

## 2023-05-12 DIAGNOSIS — E55.9 VITAMIN D DEFICIENCY: ICD-10-CM

## 2023-05-12 DIAGNOSIS — R13.14 PHARYNGOESOPHAGEAL DYSPHAGIA: Primary | ICD-10-CM

## 2023-05-12 DIAGNOSIS — G95.9 CERVICAL MYELOPATHY (CMS/HCC): ICD-10-CM

## 2023-05-12 DIAGNOSIS — M81.0 OSTEOPOROSIS WITHOUT CURRENT PATHOLOGICAL FRACTURE, UNSPECIFIED OSTEOPOROSIS TYPE: ICD-10-CM

## 2023-05-12 PROCEDURE — 99214 OFFICE O/P EST MOD 30 MIN: CPT | Performed by: INTERNAL MEDICINE

## 2023-05-12 ASSESSMENT — ENCOUNTER SYMPTOMS
SHORTNESS OF BREATH: 0
NUMBNESS: 0
DIZZINESS: 0
BACK PAIN: 0
HEADACHES: 0
DIARRHEA: 0
NAUSEA: 0
FEVER: 0
ABDOMINAL PAIN: 0
TROUBLE SWALLOWING: 1
FATIGUE: 0
PALPITATIONS: 0
CONFUSION: 0
CHILLS: 0
EYE PAIN: 0
VOMITING: 0
COUGH: 0
DYSURIA: 0
HEMATURIA: 0
SORE THROAT: 0
BRUISES/BLEEDS EASILY: 0
BLOOD IN STOOL: 0

## 2023-05-12 NOTE — PATIENT INSTRUCTIONS
PLAN    Follow up with Dr Mendez  Work on your diet  Repeat labs in 6 months  Start b12,2500 mcg 5 days a week

## 2023-05-12 NOTE — PROGRESS NOTES
Subjective      Patient ID: Rajwinder Wynne is a 57 y.o. female     HPI       Here for follow upon chronic conditions and also ongoing issues  Stressed as she has had moth issues in her apartment  Also her mother was diagnosed with Alzheimer dementia  She ha shad issues with dysphagia and is scheduled for esophageal dilatation with Botox injection by Dr Mendez   She has no prior history of problems with anesthesia      Past Medical History:   Diagnosis Date   • Allergic rhinitis    • Cerebral cavernoma     parietal cavernoma   • Cervical myelopathy (CMS/HCC)    • Cervical radiculopathy    • Cervicogenic headache    • Closed fracture of sesamoid bone of foot    • Cricopharyngeal dysphagia    • Fibroids    • Food allergy    • Fractured toe    • GERD (gastroesophageal reflux disease)    • Lipid disorder    • Nephrolithiasis    • Osteoporosis        Past Surgical History:   Procedure Laterality Date   • ANTERIOR FUSION CERVICAL SPINE      c6-7   • BUNIONECTOMY     • CRICOPHARYNGEAL MYOTOMY     • KNEE SURGERY     • LUMBAR FUSION      L5-S1   • MYOMECTOMY     • THROAT SURGERY     • TIBIAL SESAMOID EXCISION         Family History   Problem Relation Age of Onset   • Hyperlipidemia Biological Mother    • Hypertension Biological Mother    • Pulmonary fibrosis Biological Father    • Heart attack Biological Father    • Colon cancer Maternal Grandfather    • Breast cancer Mother's Sister    • Ovarian cancer Father's Sister        Social History     Socioeconomic History   • Marital status:      Spouse name: Not on file   • Number of children: Not on file   • Years of education: Not on file   • Highest education level: Not on file   Occupational History   • Not on file   Tobacco Use   • Smoking status: Never   • Smokeless tobacco: Never   Vaping Use   • Vaping status: Never Used   Substance and Sexual Activity   • Alcohol use: No   • Drug use: No   • Sexual activity: Not on file   Other Topics Concern   • Not on file    Social History Narrative   • Not on file     Social Determinants of Health     Financial Resource Strain: Not on file   Food Insecurity: No Food Insecurity (1/3/2023)    Hunger Vital Sign    • Worried About Running Out of Food in the Last Year: Never true    • Ran Out of Food in the Last Year: Never true   Transportation Needs: Not on file   Physical Activity: Not on file   Stress: Not on file   Social Connections: Not on file   Intimate Partner Violence: Not on file   Housing Stability: Not on file       Allergies   Allergen Reactions   • Liriano Flavor Other (see comments)   • Shellfish Containing Products Anaphylaxis     Pumpkin seeds   • Adhesive      Steri strips   • Mcalester Other (see comments)   • Banana Other (see comments)     also allergic to almonds, berries   • Erythromycin    • Erythromycin Base Other (see comments)     GI Upset  Other reaction(s): GI side effects   • Iodine Other (see comments)     shell fish allergy   • Latex Other (see comments)     patient not sure   • Levonorgestrel-Ethinyl Estrad    • Naprelan    • Naproxen Other (see comments)     dysphagia throat burning   • Naproxen Sodium      Other reaction(s): lump in throat   • Penicillin G    • Penicillin V Potassium Hives   • Penicillins Hives   • Pineapple Other (see comments)   • Pumpkin Seed      Other reaction(s): throat gets tight   • Shellfish Derived Hives       Current Outpatient Medications   Medication Sig Dispense Refill   • cetirizine (ZyrTEC) 10 mg tablet Take 1 tablet by mouth daily.     • cholecalciferol, vitamin D3, 25 mcg (1,000 unit) capsule take 1 tablet by oral route  every day     • denosumab (PROLIA) 60 mg/mL syringe Inject 60 mg under the skin once.     • fluticasone propionate (FLONASE) 50 mcg/actuation nasal spray USE 1 SPRAY IN EACH NOSTRIL DAILY 48 g 2   • folic acid/multivit,iron, (CENTRUM ORAL) Take by mouth.     • gabapentin (NEURONTIN) 300 mg capsule Take 900 mg by mouth 3 (three) times a day.       •  "hydrochlorothiazide (HYDRODIURIL) 25 mg tablet TAKE 1 TABLET DAILY 90 tablet 1   • meloxicam (MOBIC) 7.5 mg tablet Take 7.5 mg by mouth as needed.       • pantoprazole (PROTONIX) 40 mg EC tablet TAKE 1 TABLET DAILY 90 tablet 3   • potassium chloride (KLOR-CON M) 10 mEq CR tablet TAKE 1 TABLET DAILY 90 tablet 1   • rosuvastatin (CRESTOR) 5 mg tablet TAKE 1 TABLET DAILY (NEEDS MORE REFILLS) 90 tablet 3   • azelastine (ASTELIN) 137 mcg (0.1 %) nasal spray Administer 1 spray into each nostril 2 (two) times a day. Use in each nostril as directed. 30 mL 0     No current facility-administered medications for this visit.       Visit Vitals  Pulse 80   Resp 17   Ht 1.448 m (4' 9\")   Wt 45.8 kg (101 lb)   SpO2 97%   BMI 21.86 kg/m²         The following have been reviewed and updated as appropriate in this visit:   Allergies  Meds  Problems       Review of Systems   Constitutional: Negative for chills, fatigue and fever.   HENT: Positive for trouble swallowing. Negative for ear pain and sore throat.    Eyes: Negative for pain and visual disturbance.   Respiratory: Negative for cough and shortness of breath.    Cardiovascular: Negative for chest pain, palpitations and leg swelling.   Gastrointestinal: Negative for abdominal pain, blood in stool, diarrhea, nausea and vomiting.   Genitourinary: Negative for dysuria and hematuria.   Musculoskeletal: Negative for back pain.   Skin: Negative for rash.   Allergic/Immunologic: Negative for environmental allergies.   Neurological: Negative for dizziness, numbness and headaches.   Hematological: Does not bruise/bleed easily.   Psychiatric/Behavioral: Negative for confusion.       Objective       Physical Exam  Vitals and nursing note reviewed.   Constitutional:       Appearance: She is well-developed.   HENT:      Head: Normocephalic and atraumatic.      Right Ear: External ear normal.      Left Ear: External ear normal.      Nose: Nose normal.   Eyes:      Conjunctiva/sclera: " Conjunctivae normal.      Pupils: Pupils are equal, round, and reactive to light.   Neck:      Thyroid: No thyromegaly.   Cardiovascular:      Rate and Rhythm: Normal rate and regular rhythm.      Heart sounds: Normal heart sounds.   Pulmonary:      Effort: Pulmonary effort is normal. No respiratory distress.      Breath sounds: Normal breath sounds.   Chest:      Chest wall: No tenderness.   Abdominal:      General: Bowel sounds are normal.      Palpations: Abdomen is soft.      Tenderness: There is no abdominal tenderness. There is no guarding.   Musculoskeletal:         General: No tenderness. Normal range of motion.      Cervical back: Normal range of motion and neck supple.   Lymphadenopathy:      Cervical: No cervical adenopathy.   Skin:     General: Skin is warm and dry.      Findings: No rash.      Comments: Healed scar anterior neck   Neurological:      Mental Status: She is alert and oriented to person, place, and time.      Cranial Nerves: No cranial nerve deficit.      Sensory: No sensory deficit.   Psychiatric:         Behavior: Behavior normal.         Assessment/Plan     Pharyngoesophageal dysphagia  Scheduled for esophageal dilatation and botox injection by Dr Mendez  Labs reviewed  She has no prior history of problems with anesthesia  She has no cardiac symptoms and her exercise tolerance is good  She is ok for the procedure    Hyperlipidemia  reveiwed labs  Mild elevation since last time  She will work on her diet and continue rosuvastatin 5 mg daily  Check in 6 months    Cervical myelopathy (CMS/HCC)  Persisting symptoms  She will continue gabapentin 600 mg three times daily  Follow up with neurology Dr Arnett  She sees him in July yearly    Osteoporosis without current pathological fracture  Fair control  She is on prolia every 6 months and sees dr Crow  Discussed calcium and vitamin D supplementation    Vitamin D deficiency  Fair control  Cont vitamin D3,1000 units daily          Maya  MD Brien  5/12/2023  10:20 PM

## 2023-05-13 NOTE — ASSESSMENT & PLAN NOTE
reveiwed labs  Mild elevation since last time  She will work on her diet and continue rosuvastatin 5 mg daily  Check in 6 months

## 2023-05-13 NOTE — ASSESSMENT & PLAN NOTE
Scheduled for esophageal dilatation and botox injection by Dr Mendez  Labs reviewed  She has no prior history of problems with anesthesia  She has no cardiac symptoms and her exercise tolerance is good  She is ok for the procedure

## 2023-05-19 ENCOUNTER — HOSPITAL ENCOUNTER (OUTPATIENT)
Dept: RADIOLOGY | Age: 58
Discharge: HOME | End: 2023-05-19
Attending: UROLOGY
Payer: MEDICARE

## 2023-05-19 DIAGNOSIS — N20.0 CALCULUS OF KIDNEY: ICD-10-CM

## 2023-05-19 PROCEDURE — 76770 US EXAM ABDO BACK WALL COMP: CPT

## 2023-05-19 PROCEDURE — 74018 RADEX ABDOMEN 1 VIEW: CPT

## 2023-06-01 ENCOUNTER — TRANSCRIBE ORDERS (OUTPATIENT)
Dept: SCHEDULING | Age: 58
End: 2023-06-01

## 2023-06-01 DIAGNOSIS — Z01.818 ENCOUNTER FOR OTHER PREPROCEDURAL EXAMINATION: Primary | ICD-10-CM

## 2023-06-05 ENCOUNTER — APPOINTMENT (OUTPATIENT)
Dept: LAB | Facility: CLINIC | Age: 58
End: 2023-06-05
Attending: NURSE PRACTITIONER
Payer: MEDICARE

## 2023-06-05 ENCOUNTER — TELEPHONE (OUTPATIENT)
Dept: INTERNAL MEDICINE | Facility: CLINIC | Age: 58
End: 2023-06-05
Payer: MEDICARE

## 2023-06-05 ENCOUNTER — TRANSCRIBE ORDERS (OUTPATIENT)
Dept: LAB | Facility: CLINIC | Age: 58
End: 2023-06-05

## 2023-06-05 ENCOUNTER — HOSPITAL ENCOUNTER (OUTPATIENT)
Dept: CARDIOLOGY | Facility: CLINIC | Age: 58
Discharge: HOME | End: 2023-06-05
Attending: NURSE PRACTITIONER
Payer: MEDICARE

## 2023-06-05 DIAGNOSIS — Z01.818 ENCOUNTER FOR OTHER PREPROCEDURAL EXAMINATION: ICD-10-CM

## 2023-06-05 DIAGNOSIS — Z01.818 ENCOUNTER FOR OTHER PREPROCEDURAL EXAMINATION: Primary | ICD-10-CM

## 2023-06-05 LAB
ANION GAP SERPL CALC-SCNC: 9 MEQ/L (ref 3–15)
ATRIAL RATE: 65
BUN SERPL-MCNC: 11 MG/DL (ref 8–20)
CALCIUM SERPL-MCNC: 10 MG/DL (ref 8.9–10.3)
CHLORIDE SERPL-SCNC: 102 MEQ/L (ref 98–109)
CO2 SERPL-SCNC: 29 MEQ/L (ref 22–32)
CREAT SERPL-MCNC: 0.7 MG/DL (ref 0.6–1.1)
GFR SERPL CREATININE-BSD FRML MDRD: >60 ML/MIN/1.73M*2
GLUCOSE SERPL-MCNC: 92 MG/DL (ref 70–99)
P AXIS: 52
POTASSIUM SERPL-SCNC: 3.8 MEQ/L (ref 3.6–5.1)
PR INTERVAL: 130
QRS DURATION: 80
QT INTERVAL: 432
QTC CALCULATION(BAZETT): 449
R AXIS: -15
SODIUM SERPL-SCNC: 140 MEQ/L (ref 136–144)
T WAVE AXIS: 23
VENTRICULAR RATE: 65

## 2023-06-05 PROCEDURE — 36415 COLL VENOUS BLD VENIPUNCTURE: CPT

## 2023-06-05 PROCEDURE — 80048 BASIC METABOLIC PNL TOTAL CA: CPT

## 2023-06-05 PROCEDURE — 93005 ELECTROCARDIOGRAM TRACING: CPT

## 2023-06-06 ENCOUNTER — TELEPHONE (OUTPATIENT)
Dept: INTERNAL MEDICINE | Facility: CLINIC | Age: 58
End: 2023-06-06
Payer: MEDICARE

## 2023-07-25 ENCOUNTER — TELEPHONE (OUTPATIENT)
Dept: INTERNAL MEDICINE | Facility: CLINIC | Age: 58
End: 2023-07-25
Payer: MEDICARE

## 2023-07-25 DIAGNOSIS — M54.50 LOW BACK PAIN WITHOUT SCIATICA, UNSPECIFIED BACK PAIN LATERALITY, UNSPECIFIED CHRONICITY: Primary | ICD-10-CM

## 2023-07-25 NOTE — TELEPHONE ENCOUNTER
----- Message from Concetta Lopez RN sent at 7/25/2023  2:05 PM EDT -----  Regarding: FW: Physical therapy prescription  Contact: [phone]    ----- Message -----  From: Rajwinder Wynne  Sent: 7/25/2023   1:38 PM EDT  To: [facility] Clinical Support [facility]  Subject: Physical therapy prescription                    I am in really bad shape all the way around from my lower back down.  I tried to make an appointment with Dr. Colorado for my back/groin issues. I could not make an appointment until September 13.  Can you please fax or email a prescription for physical therapy to my physical therapist Cristiano Baez? Her fax number is [phone].  Her email address is [email or web address]  Please let me know if you do that.  Thank you so much, Rajwinder    Ps: thank you for all that you did with my mother. At least, for now she handed me all of the bottles of medication.    Also, I appreciate you telling her to go to all of these doctor appointments and not to drive.  She has an appointment with Dr. Colorado the same day as I do September 13. But she doesn’t want to go. By then I will make her. Also, I hope I am on the waiting list and get an appointment sooner.

## 2023-09-05 ENCOUNTER — TRANSCRIBE ORDERS (OUTPATIENT)
Dept: SCHEDULING | Age: 58
End: 2023-09-05

## 2023-09-05 DIAGNOSIS — M75.41 IMPINGEMENT SYNDROME OF RIGHT SHOULDER: Primary | ICD-10-CM

## 2023-09-15 ENCOUNTER — HOSPITAL ENCOUNTER (OUTPATIENT)
Dept: RADIOLOGY | Facility: CLINIC | Age: 58
Discharge: HOME | End: 2023-09-15
Attending: ORTHOPAEDIC SURGERY
Payer: MEDICARE

## 2023-09-15 DIAGNOSIS — M75.41 IMPINGEMENT SYNDROME OF RIGHT SHOULDER: ICD-10-CM

## 2023-09-28 ENCOUNTER — TELEPHONE (OUTPATIENT)
Dept: INTERNAL MEDICINE | Facility: CLINIC | Age: 58
End: 2023-09-28
Payer: MEDICARE

## 2023-09-28 NOTE — TELEPHONE ENCOUNTER
Spoke with the patient and she told me that she is scheduled for surgery on the 16th and has finally decided to get it done she will see me in the office on October 3 and will get the EKG done in the office

## 2023-09-28 NOTE — TELEPHONE ENCOUNTER
----- Message from Curtis Concepcion MA sent at 9/27/2023  8:32 AM EDT -----  Regarding: FW: Need pre-surgery appointment   Contact: [phone]  I scheduled her for October 3rd at 11:30  ----- Message -----  From: Concetta Lopez RN  Sent: 9/26/2023  12:18 PM EDT  To: Maya Tesfaye MD; Silvia Doyle; #  Subject: FW: Need pre-surgery appointment                   ----- Message -----  From: Rajwinder Wynne  Sent: 9/26/2023  12:03 PM EDT  To: [facility] Clinical Support [facility]  Subject: Need pre-surgery appointment                     Hi Dr Tesfaye,  I scheduled shoulder surgery today for Monday October 16. I need an appointment with you for medical clearance.  I tried to call your office to book an appointment and was on hold waiting, never talking to anybody.  Can we please schedule an appointment?  Also, they want me to get bloodwork which I will get, but they also want me to get an EKG. Do you do those in your office? I think I needed to get it somewhere else in the past.  I will also want to pick your brain on whether or not to even have this surgery. I will give you all of my explanations when I talk to you.  Thank you,  Rajwinder   [phone]

## 2023-09-29 ENCOUNTER — APPOINTMENT (OUTPATIENT)
Dept: LAB | Facility: CLINIC | Age: 58
End: 2023-09-29
Attending: ORTHOPAEDIC SURGERY
Payer: MEDICARE

## 2023-09-29 ENCOUNTER — TRANSCRIBE ORDERS (OUTPATIENT)
Dept: LAB | Facility: CLINIC | Age: 58
End: 2023-09-29

## 2023-09-29 DIAGNOSIS — Z01.818 ENCOUNTER FOR OTHER PREPROCEDURAL EXAMINATION: ICD-10-CM

## 2023-09-29 DIAGNOSIS — Z01.818 ENCOUNTER FOR OTHER PREPROCEDURAL EXAMINATION: Primary | ICD-10-CM

## 2023-09-29 LAB
ANION GAP SERPL CALC-SCNC: 8 MEQ/L (ref 3–15)
BASOPHILS # BLD: 0.07 K/UL (ref 0.01–0.1)
BASOPHILS NFR BLD: 1 %
BUN SERPL-MCNC: 19 MG/DL (ref 7–25)
CALCIUM SERPL-MCNC: 9.7 MG/DL (ref 8.6–10.3)
CHLORIDE SERPL-SCNC: 100 MEQ/L (ref 98–107)
CO2 SERPL-SCNC: 30 MEQ/L (ref 21–31)
CREAT SERPL-MCNC: 0.6 MG/DL (ref 0.6–1.2)
DIFFERENTIAL METHOD BLD: ABNORMAL
EOSINOPHIL # BLD: 0.23 K/UL (ref 0.04–0.36)
EOSINOPHIL NFR BLD: 3.3 %
ERYTHROCYTE [DISTWIDTH] IN BLOOD BY AUTOMATED COUNT: 13.8 % (ref 11.7–14.4)
GFR SERPL CREATININE-BSD FRML MDRD: >60 ML/MIN/1.73M*2
GLUCOSE SERPL-MCNC: 92 MG/DL (ref 70–99)
HCT VFR BLDCO AUTO: 44.8 % (ref 35–45)
HGB BLD-MCNC: 14.5 G/DL (ref 11.8–15.7)
IMM GRANULOCYTES # BLD AUTO: 0.01 K/UL (ref 0–0.08)
IMM GRANULOCYTES NFR BLD AUTO: 0.1 %
LYMPHOCYTES # BLD: 2.68 K/UL (ref 1.2–3.5)
LYMPHOCYTES NFR BLD: 38.8 %
MCH RBC QN AUTO: 33 PG (ref 28–33.2)
MCHC RBC AUTO-ENTMCNC: 32.4 G/DL (ref 32.2–35.5)
MCV RBC AUTO: 101.8 FL (ref 83–98)
MONOCYTES # BLD: 0.58 K/UL (ref 0.28–0.8)
MONOCYTES NFR BLD: 8.4 %
NEUTROPHILS # BLD: 3.34 K/UL (ref 1.7–7)
NEUTS SEG NFR BLD: 48.4 %
NRBC BLD-RTO: 0 %
PDW BLD AUTO: 12.2 FL (ref 9.4–12.3)
PLATELET # BLD AUTO: 312 K/UL (ref 150–369)
POTASSIUM SERPL-SCNC: 3.7 MEQ/L (ref 3.5–5.1)
RBC # BLD AUTO: 4.4 M/UL (ref 3.93–5.22)
SODIUM SERPL-SCNC: 138 MEQ/L (ref 136–145)
WBC # BLD AUTO: 6.91 K/UL (ref 3.8–10.5)

## 2023-09-29 PROCEDURE — 36415 COLL VENOUS BLD VENIPUNCTURE: CPT

## 2023-09-29 PROCEDURE — 85025 COMPLETE CBC W/AUTO DIFF WBC: CPT

## 2023-09-29 PROCEDURE — 80048 BASIC METABOLIC PNL TOTAL CA: CPT

## 2023-10-03 ENCOUNTER — OFFICE VISIT (OUTPATIENT)
Dept: INTERNAL MEDICINE | Facility: CLINIC | Age: 58
End: 2023-10-03
Payer: MEDICARE

## 2023-10-03 VITALS
BODY MASS INDEX: 21.57 KG/M2 | DIASTOLIC BLOOD PRESSURE: 66 MMHG | HEART RATE: 98 BPM | WEIGHT: 100 LBS | SYSTOLIC BLOOD PRESSURE: 122 MMHG | RESPIRATION RATE: 17 BRPM | OXYGEN SATURATION: 96 % | HEIGHT: 57 IN

## 2023-10-03 DIAGNOSIS — M25.511 RIGHT SHOULDER PAIN, UNSPECIFIED CHRONICITY: Primary | ICD-10-CM

## 2023-10-03 DIAGNOSIS — E55.9 VITAMIN D DEFICIENCY: ICD-10-CM

## 2023-10-03 DIAGNOSIS — E78.5 HYPERLIPIDEMIA, UNSPECIFIED HYPERLIPIDEMIA TYPE: ICD-10-CM

## 2023-10-03 DIAGNOSIS — M81.0 OSTEOPOROSIS WITHOUT CURRENT PATHOLOGICAL FRACTURE, UNSPECIFIED OSTEOPOROSIS TYPE: ICD-10-CM

## 2023-10-03 DIAGNOSIS — K21.9 GASTROESOPHAGEAL REFLUX DISEASE WITHOUT ESOPHAGITIS: ICD-10-CM

## 2023-10-03 DIAGNOSIS — G89.4 CHRONIC PAIN SYNDROME: ICD-10-CM

## 2023-10-03 PROCEDURE — G0008 ADMIN INFLUENZA VIRUS VAC: HCPCS | Performed by: INTERNAL MEDICINE

## 2023-10-03 PROCEDURE — 99214 OFFICE O/P EST MOD 30 MIN: CPT | Mod: 25 | Performed by: INTERNAL MEDICINE

## 2023-10-03 PROCEDURE — 90686 IIV4 VACC NO PRSV 0.5 ML IM: CPT | Performed by: INTERNAL MEDICINE

## 2023-10-03 NOTE — PROGRESS NOTES
Subjective      Patient ID: Rajwinder Wynne is a 57 y.o. female     HPI         Here for pre op physical exam prior to going for right shoulder arthroscopic surgery by Dr. Dio Lyon    She injured her shoulder in 2020 when she was walking her dog and since then has had recurring pain and discomfort more so recently she has failed conservative measures of PT and cortisone injections and was therefore advised surgery right shoulder pain  She has no acute cardiac symptoms  She has no prior history of problems with anesthesia  She has no history of diabetes and no chronic kidney disease      Past Medical History:   Diagnosis Date    Allergic rhinitis     Cerebral cavernoma     parietal cavernoma    Cervical myelopathy (CMS/HCC)     Cervical radiculopathy     Cervicogenic headache     Closed fracture of sesamoid bone of foot     Cricopharyngeal dysphagia     Fibroids     Food allergy     Fractured toe     GERD (gastroesophageal reflux disease)     Lipid disorder     Nephrolithiasis     Osteoporosis        Past Surgical History:   Procedure Laterality Date    ANTERIOR FUSION CERVICAL SPINE      c6-7    BUNIONECTOMY      CRICOPHARYNGEAL MYOTOMY      KNEE SURGERY      LUMBAR FUSION      L5-S1    MYOMECTOMY      THROAT SURGERY      TIBIAL SESAMOID EXCISION         Family History   Problem Relation Age of Onset    Hyperlipidemia Biological Mother     Hypertension Biological Mother     Pulmonary fibrosis Biological Father     Heart attack Biological Father     Colon cancer Maternal Grandfather     Breast cancer Mother's Sister     Ovarian cancer Father's Sister        Social History     Socioeconomic History    Marital status:      Spouse name: Not on file    Number of children: Not on file    Years of education: Not on file    Highest education level: Not on file   Occupational History    Not on file   Tobacco Use    Smoking status: Never    Smokeless tobacco: Never   Vaping  Use    Vaping Use: Never used   Substance and Sexual Activity    Alcohol use: No    Drug use: No    Sexual activity: Not on file   Other Topics Concern    Not on file   Social History Narrative    Not on file     Social Determinants of Health     Financial Resource Strain: Not on file   Food Insecurity: No Food Insecurity (1/3/2023)    Hunger Vital Sign     Worried About Running Out of Food in the Last Year: Never true     Ran Out of Food in the Last Year: Never true   Transportation Needs: Not on file   Physical Activity: Not on file   Stress: Not on file   Social Connections: Not on file   Intimate Partner Violence: Not on file   Housing Stability: Not on file       Allergies   Allergen Reactions    Liriano Flavor Other (see comments)    Shellfish Containing Products Anaphylaxis     Pumpkin seeds    Adhesive      Steri strips    [place] Other (see comments)    Banana Other (see comments)     also allergic to almonds, berries    Erythromycin     Erythromycin Base Other (see comments)     GI Upset  Other reaction(s): GI side effects    Iodine Other (see comments)     shell fish allergy    Latex Other (see comments)     patient not sure    Levonorgestrel-Ethinyl Estrad     Naprelan     Naproxen Other (see comments)     dysphagia throat burning    Naproxen Sodium      Other reaction(s): lump in throat    Penicillin G     Penicillin V Potassium Hives    Penicillins Hives    Pineapple Other (see comments)    Pumpkin Seed      Other reaction(s): throat gets tight    Shellfish Derived Hives       Current Outpatient Medications   Medication Sig Dispense Refill    cholecalciferol, vitamin D3, 25 mcg (1,000 unit) capsule take 1 tablet by oral route  every day      denosumab (PROLIA) 60 mg/mL syringe Inject 60 mg under the skin once.      fluticasone propionate (FLONASE) 50 mcg/actuation nasal spray USE 1 SPRAY IN EACH NOSTRIL DAILY 48 g 2    folic acid/multivit,iron, (CENTRUM ORAL) Take by  "mouth.      gabapentin (NEURONTIN) 300 mg capsule Take 900 mg by mouth 3 (three) times a day.        hydrochlorothiazide (HYDRODIURIL) 25 mg tablet TAKE 1 TABLET DAILY 90 tablet 1    pantoprazole (PROTONIX) 40 mg EC tablet TAKE 1 TABLET DAILY 90 tablet 3    potassium chloride (KLOR-CON M) 10 mEq CR tablet TAKE 1 TABLET DAILY 90 tablet 1    rosuvastatin (CRESTOR) 5 mg tablet TAKE 1 TABLET DAILY (NEEDS MORE REFILLS) 90 tablet 1    azelastine (ASTELIN) 137 mcg (0.1 %) nasal spray Administer 1 spray into each nostril 2 (two) times a day. Use in each nostril as directed. 30 mL 0     No current facility-administered medications for this visit.       Visit Vitals  /66   Pulse 98   Resp 17   Ht 1.448 m (4' 9\")   Wt 45.4 kg (100 lb)   SpO2 96%   BMI 21.64 kg/m²         The following have been reviewed and updated as appropriate in this visit:   Allergies  Meds  Problems       Review of Systems   Constitutional: Negative for chills, fatigue and fever.   HENT: Negative for ear pain and sore throat.    Eyes: Negative for pain and visual disturbance.   Respiratory: Negative for cough and shortness of breath.    Cardiovascular: Negative for chest pain, palpitations and leg swelling.   Gastrointestinal: Negative for abdominal pain, blood in stool, diarrhea, nausea and vomiting.   Genitourinary: Negative for dysuria and hematuria.   Musculoskeletal: Negative for back pain.        Right shoulder pain   Skin: Negative for rash.   Allergic/Immunologic: Negative for environmental allergies.   Neurological: Negative for dizziness, numbness and headaches.   Hematological: Does not bruise/bleed easily.   Psychiatric/Behavioral: Negative for confusion.       Objective       Physical Exam  Vitals and nursing note reviewed.   Constitutional:       Appearance: She is well-developed.   HENT:      Head: Normocephalic and atraumatic.      Right Ear: External ear normal.      Left Ear: External ear normal.      Nose: Nose normal. "   Eyes:      Conjunctiva/sclera: Conjunctivae normal.      Pupils: Pupils are equal, round, and reactive to light.   Neck:      Thyroid: No thyromegaly.   Cardiovascular:      Rate and Rhythm: Normal rate and regular rhythm.      Heart sounds: Normal heart sounds.   Pulmonary:      Effort: Pulmonary effort is normal. No respiratory distress.      Breath sounds: Normal breath sounds.   Chest:      Chest wall: No tenderness.   Abdominal:      General: Bowel sounds are normal.      Palpations: Abdomen is soft.      Tenderness: There is no abdominal tenderness. There is no guarding.   Musculoskeletal:         General: No tenderness. Normal range of motion.      Cervical back: Normal range of motion and neck supple.      Comments: Tenderness right anterior shoulder   Lymphadenopathy:      Cervical: No cervical adenopathy.   Skin:     General: Skin is warm and dry.      Findings: No rash.   Neurological:      Mental Status: She is alert and oriented to person, place, and time.      Cranial Nerves: No cranial nerve deficit.      Sensory: No sensory deficit.   Psychiatric:         Behavior: Behavior normal.         Assessment/Plan     Right shoulder pain  Physical exam done  Scheduled for right shoulder arthroscopic surgery by Dr. Dio Lyon  She has had persisting shoulder pain interfering in her daily activities and failed conservative measures including  PT and cortisone injections   Labs and EKG reviewed  She has no acute cardiac symptoms   she has no prior history of problems with anesthesia  She has no history of diabetes and no chronic kidney disease  She is okay for the procedure      Hyperlipidemia  Fair control  She will continue the rosuvastatin 5 mg p.o. daily    Osteoporosis without current pathological fracture  Fair control  She is on prolia every 6 months and sees dr Crow  Discussed calcium and vitamin D supplementation    GERD (gastroesophageal reflux disease)  Stable  She uses pantoprazole 40 mg as  needed    Chronic pain syndrome  She has chronic pain syndrome secondary to cervical myelopathy and history of cervical discectomies  She follows with neurology Dr. Arnett yearly   she will continue the gabapentin 300 mg 3 times daily    Vitamin D deficiency  Stable  Continue vitamin D3 1000 units daily        Maya Tesfaye MD  10/4/2023  3:56 PM

## 2023-10-03 NOTE — PATIENT INSTRUCTIONS
PLAN    Flu vaccine  EKG is normal  No asa,advil,motrin,ibuprofen and vitamins   Going for right shoulder arthroscopy  Drink lots of fluids  See me in 3 months

## 2023-10-04 PROBLEM — M25.511 RIGHT SHOULDER PAIN: Status: ACTIVE | Noted: 2023-10-04

## 2023-10-04 ASSESSMENT — ENCOUNTER SYMPTOMS
NUMBNESS: 0
HEMATURIA: 0
COUGH: 0
BACK PAIN: 0
SORE THROAT: 0
BLOOD IN STOOL: 0
DIZZINESS: 0
SHORTNESS OF BREATH: 0
BRUISES/BLEEDS EASILY: 0
ABDOMINAL PAIN: 0
FATIGUE: 0
PALPITATIONS: 0
DYSURIA: 0
VOMITING: 0
HEADACHES: 0
NAUSEA: 0
DIARRHEA: 0
EYE PAIN: 0
CHILLS: 0
CONFUSION: 0
FEVER: 0

## 2023-10-04 NOTE — ASSESSMENT & PLAN NOTE
Physical exam done  Scheduled for right shoulder arthroscopic surgery by Dr. Dio Lyon  She has had persisting shoulder pain interfering in her daily activities and failed conservative measures including  PT and cortisone injections   Labs and EKG reviewed  She has no acute cardiac symptoms   she has no prior history of problems with anesthesia  She has no history of diabetes and no chronic kidney disease  She is okay for the procedure

## 2023-10-04 NOTE — ASSESSMENT & PLAN NOTE
She has chronic pain syndrome secondary to cervical myelopathy and history of cervical discectomies  She follows with neurology Dr. Arnett yearly   she will continue the gabapentin 300 mg 3 times daily

## 2023-10-05 ENCOUNTER — TELEPHONE (OUTPATIENT)
Dept: INTERNAL MEDICINE | Facility: CLINIC | Age: 58
End: 2023-10-05
Payer: MEDICARE

## 2023-10-16 ENCOUNTER — TELEPHONE (OUTPATIENT)
Dept: INTERNAL MEDICINE | Facility: CLINIC | Age: 58
End: 2023-10-16
Payer: MEDICARE

## 2023-10-16 RX ORDER — ONDANSETRON 4 MG/1
4 TABLET, ORALLY DISINTEGRATING ORAL EVERY 12 HOURS PRN
Qty: 14 TABLET | Refills: 0 | Status: SHIPPED | OUTPATIENT
Start: 2023-10-16 | End: 2024-08-26

## 2023-10-16 NOTE — TELEPHONE ENCOUNTER
Spoke with the patient and told me  that she has taken Zofran in the past had increased heart rate on the higher dose but tolerated the lower dose ok.  Prescription sent to the pharmacy

## 2023-10-16 NOTE — TELEPHONE ENCOUNTER
----- Message from Concetta Lopez RN sent at 10/16/2023  2:30 PM EDT -----  Regarding: FW: Rajwinder Anti- nausea medicine for shoulder surgery  Contact: [phone]    ----- Message -----  From: Rajwinder Wynne  Sent: 10/16/2023   2:07 PM EDT  To:  [facility] Clinical Support P  Subject: Rajwinder Anti- nausea medicine for shoulder berry#    I am not sure. Is there another one you could give me?

## 2023-10-16 NOTE — TELEPHONE ENCOUNTER
----- Message from Concetta Lopez RN sent at 10/16/2023  9:02 AM EDT -----  Regarding: FW: Rajwinder Anti- nausea medicine for shoulder surgery  Contact: [phone]    ----- Message -----  From: Rajwinder Wynne  Sent: 10/16/2023   6:57 AM EDT  To:  Adult Med [facility] Clinical Support P  Subject: Rajwinder Anti- nausea medicine for shoulder berry#    Good morning Dr Tesfaye,  I am in the preop for my shoulder surgery right now. They prescribed me Percocet to . But Dr. Rubinstein says they will not prescribe anti-nausea medicine. I have no idea why. Very annoying. Especially since I cannot take a Percocet without an anti-nausea medication.    Can you please send an anti-nausea medication to the [facility] at [address] [place] [address].?    Thank you so much,  Rajwinder Wynne

## 2023-11-09 RX ORDER — HYDROCHLOROTHIAZIDE 25 MG/1
TABLET ORAL
Qty: 90 TABLET | Refills: 3 | Status: SHIPPED | OUTPATIENT
Start: 2023-11-09 | End: 2024-11-04

## 2024-02-22 ENCOUNTER — TELEPHONE (OUTPATIENT)
Dept: INTERNAL MEDICINE | Facility: CLINIC | Age: 59
End: 2024-02-22
Payer: MEDICARE

## 2024-02-22 DIAGNOSIS — E55.9 VITAMIN D DEFICIENCY: ICD-10-CM

## 2024-02-22 DIAGNOSIS — R73.9 HYPERGLYCEMIA: ICD-10-CM

## 2024-02-22 DIAGNOSIS — E78.5 HYPERLIPIDEMIA, UNSPECIFIED HYPERLIPIDEMIA TYPE: Primary | ICD-10-CM

## 2024-02-22 NOTE — TELEPHONE ENCOUNTER
Pt called earlier to see if she needs blood work done. He appointment with you is on Monday and she is going in the morning to have it done if she needs it. Please give her a call to let her know if she needs it or not    Thank you

## 2024-02-23 ENCOUNTER — APPOINTMENT (OUTPATIENT)
Dept: LAB | Facility: CLINIC | Age: 59
End: 2024-02-23
Attending: INTERNAL MEDICINE
Payer: MEDICARE

## 2024-02-23 DIAGNOSIS — R73.9 HYPERGLYCEMIA: ICD-10-CM

## 2024-02-23 DIAGNOSIS — E78.5 HYPERLIPIDEMIA, UNSPECIFIED HYPERLIPIDEMIA TYPE: ICD-10-CM

## 2024-02-23 DIAGNOSIS — E55.9 VITAMIN D DEFICIENCY: ICD-10-CM

## 2024-02-23 LAB
25(OH)D3 SERPL-MCNC: 39 NG/ML (ref 30–100)
ALBUMIN SERPL-MCNC: 5 G/DL (ref 3.5–5.7)
ALP SERPL-CCNC: 62 IU/L (ref 34–125)
ALT SERPL-CCNC: 17 IU/L (ref 7–52)
ANION GAP SERPL CALC-SCNC: 9 MEQ/L (ref 3–15)
AST SERPL-CCNC: 17 IU/L (ref 13–39)
BASOPHILS # BLD: 0.05 K/UL (ref 0.01–0.1)
BASOPHILS NFR BLD: 0.5 %
BILIRUB SERPL-MCNC: 0.7 MG/DL (ref 0.3–1.2)
BUN SERPL-MCNC: 20 MG/DL (ref 7–25)
CALCIUM SERPL-MCNC: 11.1 MG/DL (ref 8.6–10.3)
CHLORIDE SERPL-SCNC: 96 MEQ/L (ref 98–107)
CHOLEST SERPL-MCNC: 204 MG/DL
CO2 SERPL-SCNC: 32 MEQ/L (ref 21–31)
CREAT SERPL-MCNC: 0.7 MG/DL (ref 0.6–1.2)
CRP SERPL-MCNC: <1 MG/L
DIFFERENTIAL METHOD BLD: ABNORMAL
EGFRCR SERPLBLD CKD-EPI 2021: >60 ML/MIN/1.73M*2
EOSINOPHIL # BLD: 0.14 K/UL (ref 0.04–0.36)
EOSINOPHIL NFR BLD: 1.3 %
ERYTHROCYTE [DISTWIDTH] IN BLOOD BY AUTOMATED COUNT: 13 % (ref 11.7–14.4)
EST. AVERAGE GLUCOSE BLD GHB EST-MCNC: 105 MG/DL
GLUCOSE SERPL-MCNC: 91 MG/DL (ref 70–99)
HBA1C MFR BLD: 5.3 %
HCT VFR BLD AUTO: 47.1 % (ref 35–45)
HDLC SERPL-MCNC: 79 MG/DL
HDLC SERPL: 2.6 {RATIO}
HGB BLD-MCNC: 15.2 G/DL (ref 11.8–15.7)
IMM GRANULOCYTES # BLD AUTO: 0.07 K/UL (ref 0–0.08)
IMM GRANULOCYTES NFR BLD AUTO: 0.7 %
LDLC SERPL CALC-MCNC: 99 MG/DL
LYMPHOCYTES # BLD: 2.2 K/UL (ref 1.2–3.5)
LYMPHOCYTES NFR BLD: 21.1 %
MCH RBC QN AUTO: 32.5 PG (ref 28–33.2)
MCHC RBC AUTO-ENTMCNC: 32.3 G/DL (ref 32.2–35.5)
MCV RBC AUTO: 100.9 FL (ref 83–98)
MONOCYTES # BLD: 0.91 K/UL (ref 0.28–0.8)
MONOCYTES NFR BLD: 8.7 %
NEUTROPHILS # BLD: 7.04 K/UL (ref 1.7–7)
NEUTS SEG NFR BLD: 67.7 %
NONHDLC SERPL-MCNC: 125 MG/DL
NRBC BLD-RTO: 0 %
PDW BLD AUTO: 12.2 FL (ref 9.4–12.3)
PLATELET # BLD AUTO: 419 K/UL (ref 150–369)
POTASSIUM SERPL-SCNC: 4.7 MEQ/L (ref 3.5–5.1)
PROT SERPL-MCNC: 7.5 G/DL (ref 6–8.2)
RBC # BLD AUTO: 4.67 M/UL (ref 3.93–5.22)
SODIUM SERPL-SCNC: 137 MEQ/L (ref 136–145)
TRIGL SERPL-MCNC: 128 MG/DL
TSH SERPL DL<=0.05 MIU/L-ACNC: 1.92 MIU/L (ref 0.34–5.6)
WBC # BLD AUTO: 10.41 K/UL (ref 3.8–10.5)

## 2024-02-23 PROCEDURE — 80053 COMPREHEN METABOLIC PANEL: CPT

## 2024-02-23 PROCEDURE — 36415 COLL VENOUS BLD VENIPUNCTURE: CPT

## 2024-02-23 PROCEDURE — 85025 COMPLETE CBC W/AUTO DIFF WBC: CPT

## 2024-02-23 PROCEDURE — 83036 HEMOGLOBIN GLYCOSYLATED A1C: CPT

## 2024-02-23 PROCEDURE — 86140 C-REACTIVE PROTEIN: CPT

## 2024-02-23 PROCEDURE — 84443 ASSAY THYROID STIM HORMONE: CPT

## 2024-02-23 PROCEDURE — 80061 LIPID PANEL: CPT

## 2024-02-23 PROCEDURE — 82306 VITAMIN D 25 HYDROXY: CPT

## 2024-02-26 ENCOUNTER — OFFICE VISIT (OUTPATIENT)
Dept: INTERNAL MEDICINE | Facility: CLINIC | Age: 59
End: 2024-02-26
Payer: MEDICARE

## 2024-02-26 VITALS
RESPIRATION RATE: 17 BRPM | SYSTOLIC BLOOD PRESSURE: 128 MMHG | WEIGHT: 99 LBS | OXYGEN SATURATION: 99 % | BODY MASS INDEX: 21.36 KG/M2 | DIASTOLIC BLOOD PRESSURE: 76 MMHG | HEART RATE: 88 BPM | HEIGHT: 57 IN

## 2024-02-26 DIAGNOSIS — M81.0 OSTEOPOROSIS WITHOUT CURRENT PATHOLOGICAL FRACTURE, UNSPECIFIED OSTEOPOROSIS TYPE: ICD-10-CM

## 2024-02-26 DIAGNOSIS — R79.89 ELEVATED PLATELET COUNT: ICD-10-CM

## 2024-02-26 DIAGNOSIS — E78.5 HYPERLIPIDEMIA, UNSPECIFIED HYPERLIPIDEMIA TYPE: ICD-10-CM

## 2024-02-26 DIAGNOSIS — K21.9 GASTROESOPHAGEAL REFLUX DISEASE WITHOUT ESOPHAGITIS: ICD-10-CM

## 2024-02-26 PROCEDURE — 99213 OFFICE O/P EST LOW 20 MIN: CPT | Performed by: INTERNAL MEDICINE

## 2024-02-26 ASSESSMENT — ENCOUNTER SYMPTOMS
SORE THROAT: 0
NUMBNESS: 0
EYE PAIN: 0
HEMATURIA: 0
BACK PAIN: 1
DIZZINESS: 0
ABDOMINAL PAIN: 0
CONFUSION: 0
DIARRHEA: 0
NAUSEA: 0
VOMITING: 0
PALPITATIONS: 0
CHILLS: 0
COUGH: 0
DYSURIA: 0
HEADACHES: 0
BRUISES/BLEEDS EASILY: 0
FEVER: 0
SHORTNESS OF BREATH: 0
FATIGUE: 0
BLOOD IN STOOL: 0

## 2024-02-26 ASSESSMENT — PATIENT HEALTH QUESTIONNAIRE - PHQ9: SUM OF ALL RESPONSES TO PHQ9 QUESTIONS 1 & 2: 0

## 2024-02-26 NOTE — PROGRESS NOTES
Subjective      Patient ID: Rajwinder Wynne is a 58 y.o. female     HPI       Here for follow up on chronic conditions and review test results  Has had back issues and following with Dr Chinchilla  Had cortisone injection in her back by Dr Chinchilla for back pain    and this was  her second injection  Had shoulder surgery and is doing ok   Has not  Been very compliant with diet recenlty    Past Medical History:   Diagnosis Date   • Allergic rhinitis    • Cerebral cavernoma     parietal cavernoma   • Cervical myelopathy (CMS/HCC)    • Cervical radiculopathy    • Cervicogenic headache    • Closed fracture of sesamoid bone of foot    • Cricopharyngeal dysphagia    • Fibroids    • Food allergy    • Fractured toe    • GERD (gastroesophageal reflux disease)    • Lipid disorder    • Nephrolithiasis    • Osteoporosis        Past Surgical History:   Procedure Laterality Date   • ANTERIOR FUSION CERVICAL SPINE      c6-7   • BUNIONECTOMY     • CRICOPHARYNGEAL MYOTOMY     • KNEE SURGERY     • LUMBAR FUSION      L5-S1   • MYOMECTOMY     • THROAT SURGERY     • TIBIAL SESAMOID EXCISION         Family History   Problem Relation Age of Onset   • Hyperlipidemia Biological Mother    • Hypertension Biological Mother    • Pulmonary fibrosis Biological Father    • Heart attack Biological Father    • Colon cancer Maternal Grandfather    • Breast cancer Mother's Sister    • Ovarian cancer Father's Sister        Social History     Socioeconomic History   • Marital status:      Spouse name: Not on file   • Number of children: Not on file   • Years of education: Not on file   • Highest education level: Not on file   Occupational History   • Not on file   Tobacco Use   • Smoking status: Never   • Smokeless tobacco: Never   Vaping Use   • Vaping Use: Never used   Substance and Sexual Activity   • Alcohol use: No   • Drug use: No   • Sexual activity: Not on file   Other Topics Concern   • Not on file   Social History Narrative   • Not on file      Social Determinants of Health     Financial Resource Strain: Not on file   Food Insecurity: No Food Insecurity (1/3/2023)    Hunger Vital Sign    • Worried About Running Out of Food in the Last Year: Never true    • Ran Out of Food in the Last Year: Never true   Transportation Needs: Not on file   Physical Activity: Not on file   Stress: Not on file   Social Connections: Not on file   Intimate Partner Violence: Not on file   Housing Stability: Not on file       Allergies   Allergen Reactions   • Liriano Flavor Other (see comments)   • Shellfish Containing Products Anaphylaxis     Pumpkin seeds   • Adhesive      Steri strips   • [place] Other (see comments)   • Banana Other (see comments)     also allergic to almonds, berries   • Erythromycin    • Erythromycin Base Other (see comments)     GI Upset  Other reaction(s): GI side effects   • Iodine Other (see comments)     shell fish allergy   • Latex Other (see comments)     patient not sure   • Levonorgestrel-Ethinyl Estrad    • Naprelan    • Naproxen Other (see comments)     dysphagia throat burning   • Naproxen Sodium      Other reaction(s): lump in throat   • Penicillin G    • Penicillin V Potassium Hives   • Penicillins Hives   • Pineapple Other (see comments)   • Pumpkin Seed      Other reaction(s): throat gets tight   • Shellfish Derived Hives       Current Outpatient Medications   Medication Sig Dispense Refill   • cholecalciferol, vitamin D3, 25 mcg (1,000 unit) capsule take 1 tablet by oral route  every day     • denosumab (PROLIA) 60 mg/mL syringe Inject 60 mg under the skin once.     • fluticasone propionate (FLONASE) 50 mcg/actuation nasal spray USE 1 SPRAY IN EACH NOSTRIL DAILY 48 g 2   • folic acid/multivit,iron, (CENTRUM ORAL) Take by mouth.     • gabapentin (NEURONTIN) 300 mg capsule Take 900 mg by mouth 3 (three) times a day.       • hydrochlorothiazide (HYDRODIURIL) 25 mg tablet TAKE 1 TABLET DAILY 90 tablet 3   • pantoprazole (PROTONIX) 40 mg  "EC tablet TAKE 1 TABLET DAILY 90 tablet 1   • potassium chloride (KLOR-CON M) 10 mEq CR tablet TAKE 1 TABLET DAILY 90 tablet 1   • rosuvastatin (CRESTOR) 5 mg tablet TAKE 1 TABLET DAILY (NEEDS MORE REFILLS) 90 tablet 1   • azelastine (ASTELIN) 137 mcg (0.1 %) nasal spray Administer 1 spray into each nostril 2 (two) times a day. Use in each nostril as directed. 30 mL 0   • ondansetron ODT (ZOFRAN-ODT) 4 mg disintegrating tablet Take 1 tablet (4 mg total) by mouth every 12 (twelve) hours as needed for nausea or vomiting for up to 7 days. 14 tablet 0     No current facility-administered medications for this visit.       Visit Vitals  /76   Pulse 88   Resp 17   Ht 1.448 m (4' 9\")   Wt 44.9 kg (99 lb)   SpO2 99%   BMI 21.42 kg/m²         The following have been reviewed and updated as appropriate in this visit:   Allergies  Meds  Problems       Review of Systems   Constitutional: Negative for chills, fatigue and fever.   HENT: Negative for ear pain and sore throat.    Eyes: Negative for pain and visual disturbance.   Respiratory: Negative for cough and shortness of breath.    Cardiovascular: Negative for chest pain, palpitations and leg swelling.   Gastrointestinal: Negative for abdominal pain, blood in stool, diarrhea, nausea and vomiting.   Genitourinary: Negative for dysuria and hematuria.   Musculoskeletal: Positive for back pain.   Skin: Negative for rash.   Allergic/Immunologic: Negative for environmental allergies.   Neurological: Negative for dizziness, numbness and headaches.   Hematological: Does not bruise/bleed easily.   Psychiatric/Behavioral: Negative for confusion.       Objective       Physical Exam  Vitals and nursing note reviewed.   Constitutional:       Appearance: She is well-developed.   HENT:      Head: Normocephalic and atraumatic.      Right Ear: External ear normal.      Left Ear: External ear normal.      Nose: Nose normal.   Eyes:      Conjunctiva/sclera: Conjunctivae normal.      " Pupils: Pupils are equal, round, and reactive to light.   Neck:      Thyroid: No thyromegaly.   Cardiovascular:      Rate and Rhythm: Normal rate and regular rhythm.      Heart sounds: Normal heart sounds.   Pulmonary:      Effort: Pulmonary effort is normal. No respiratory distress.      Breath sounds: Normal breath sounds.   Chest:      Chest wall: No tenderness.   Abdominal:      General: Bowel sounds are normal.      Palpations: Abdomen is soft.      Tenderness: There is no abdominal tenderness. There is no guarding.   Musculoskeletal:         General: No tenderness. Normal range of motion.      Cervical back: Normal range of motion and neck supple.      Comments: Tenderness lumbar muscles   Lymphadenopathy:      Cervical: No cervical adenopathy.   Skin:     General: Skin is warm and dry.      Findings: No rash.   Neurological:      Mental Status: She is alert and oriented to person, place, and time.      Cranial Nerves: No cranial nerve deficit.      Sensory: No sensory deficit.   Psychiatric:         Behavior: Behavior normal.         Assessment/Plan     Elevated platelet count  Mild elevation likely due to recent viral illness  Repeat in 2 months    Hyperlipidemia  Fair control  She will continue the rosuvastatin 5 mg p.o. daily  Discussed dietary changes  Increase fluid intake    GERD (gastroesophageal reflux disease)  Stable  She uses pantoprazole 40 mg as needed    Osteoporosis without current pathological fracture  Fair control  She is on prolia every 6 months and sees dr Crow  Discussed calcium and vitamin D supplementation        Maya Tesfaye MD  2/26/2024  9:05 PM

## 2024-02-26 NOTE — PATIENT INSTRUCTIONS
PLAN    Watch the diet  Increase fluids  Watch the calcium pill intake  Blood work in may  Start PT  Cont the meds   See me in 6 months

## 2024-02-27 NOTE — ASSESSMENT & PLAN NOTE
Fair control  She will continue the rosuvastatin 5 mg p.o. daily  Discussed dietary changes  Increase fluid intake

## 2024-03-24 NOTE — PROGRESS NOTES
[facility]     Patient ID: Rajwinder Wynne is a 58 y.o. female.  1965    Chief Complaint: S status post right shoulder arthroscopic rotator cuff repair with Regeneten patch    HPI: She approximately 5 months status post right shoulder arthroscopic surgery.  She has been progressing with therapy.  There are no complications.    Review of Systems:  Review of systems negative except as stated in above HPI.        There were no vitals filed for this visit.  There is no height or weight on file to calculate BMI.    Physical Exam: She has full range of motion passively in all directions and actively lacks a small degree of internal rotation.  Strength is 5-/5 in forward elevation and abduction.  There is no impingement and no instability.  Imaging:    Assessment/Plan: Progressing nicely status post right shoulder rotator cuff repair with Regeneten patch    Spoke about physical therapy versus a home exercise program.  She has a good prognosis overall.      Dio Lyon MD

## 2024-03-27 ENCOUNTER — OFFICE VISIT (OUTPATIENT)
Dept: ORTHOPEDICS | Facility: CLINIC | Age: 59
End: 2024-03-27
Payer: MEDICARE

## 2024-03-27 VITALS — BODY MASS INDEX: 21.57 KG/M2 | RESPIRATION RATE: 18 BRPM | WEIGHT: 100 LBS | HEIGHT: 57 IN

## 2024-03-27 DIAGNOSIS — M75.111 INCOMPLETE TEAR OF RIGHT ROTATOR CUFF, UNSPECIFIED WHETHER TRAUMATIC: Primary | ICD-10-CM

## 2024-03-27 PROCEDURE — 99213 OFFICE O/P EST LOW 20 MIN: CPT | Performed by: ORTHOPAEDIC SURGERY

## 2024-03-27 RX ORDER — MELOXICAM 7.5 MG/1
7.5 TABLET ORAL AS NEEDED
COMMUNITY
Start: 2024-01-15

## 2024-04-29 RX ORDER — PANTOPRAZOLE SODIUM 40 MG/1
TABLET, DELAYED RELEASE ORAL
Qty: 90 TABLET | Refills: 1 | Status: SHIPPED | OUTPATIENT
Start: 2024-04-29 | End: 2024-10-25

## 2024-05-02 ENCOUNTER — APPOINTMENT (OUTPATIENT)
Dept: LAB | Facility: CLINIC | Age: 59
End: 2024-05-02
Attending: INTERNAL MEDICINE
Payer: MEDICARE

## 2024-05-02 DIAGNOSIS — E78.5 HYPERLIPIDEMIA, UNSPECIFIED HYPERLIPIDEMIA TYPE: ICD-10-CM

## 2024-05-02 DIAGNOSIS — R79.89 ELEVATED PLATELET COUNT: ICD-10-CM

## 2024-05-02 LAB
ALBUMIN SERPL-MCNC: 4.6 G/DL (ref 3.5–5.7)
ALP SERPL-CCNC: 50 IU/L (ref 34–125)
ALT SERPL-CCNC: 14 IU/L (ref 7–52)
ANION GAP SERPL CALC-SCNC: 8 MEQ/L (ref 3–15)
AST SERPL-CCNC: 19 IU/L (ref 13–39)
BASOPHILS # BLD: 0.07 K/UL (ref 0.01–0.1)
BASOPHILS NFR BLD: 1 %
BILIRUB SERPL-MCNC: 0.6 MG/DL (ref 0.3–1.2)
BUN SERPL-MCNC: 25 MG/DL (ref 7–25)
CALCIUM SERPL-MCNC: 10.1 MG/DL (ref 8.6–10.3)
CHLORIDE SERPL-SCNC: 102 MEQ/L (ref 98–107)
CO2 SERPL-SCNC: 30 MEQ/L (ref 21–31)
CREAT SERPL-MCNC: 0.7 MG/DL (ref 0.6–1.2)
DIFFERENTIAL METHOD BLD: ABNORMAL
EGFRCR SERPLBLD CKD-EPI 2021: >60 ML/MIN/1.73M*2
EOSINOPHIL # BLD: 0.29 K/UL (ref 0.04–0.36)
EOSINOPHIL NFR BLD: 4.1 %
ERYTHROCYTE [DISTWIDTH] IN BLOOD BY AUTOMATED COUNT: 13 % (ref 11.7–14.4)
GLUCOSE SERPL-MCNC: 87 MG/DL (ref 70–99)
HCT VFR BLD AUTO: 45.3 % (ref 35–45)
HGB BLD-MCNC: 14.7 G/DL (ref 11.8–15.7)
IMM GRANULOCYTES # BLD AUTO: 0.02 K/UL (ref 0–0.08)
IMM GRANULOCYTES NFR BLD AUTO: 0.3 %
LYMPHOCYTES # BLD: 2.4 K/UL (ref 1.2–3.5)
LYMPHOCYTES NFR BLD: 33.7 %
MCH RBC QN AUTO: 33.5 PG (ref 28–33.2)
MCHC RBC AUTO-ENTMCNC: 32.5 G/DL (ref 32.2–35.5)
MCV RBC AUTO: 103.2 FL (ref 83–98)
MONOCYTES # BLD: 0.75 K/UL (ref 0.28–0.8)
MONOCYTES NFR BLD: 10.5 %
NEUTROPHILS # BLD: 3.59 K/UL (ref 1.7–7)
NEUTS SEG NFR BLD: 50.4 %
NRBC BLD-RTO: 0 %
PDW BLD AUTO: 12.5 FL (ref 9.4–12.3)
PLATELET # BLD AUTO: 322 K/UL (ref 150–369)
POTASSIUM SERPL-SCNC: 3.9 MEQ/L (ref 3.5–5.1)
PROT SERPL-MCNC: 6.9 G/DL (ref 6–8.2)
RBC # BLD AUTO: 4.39 M/UL (ref 3.93–5.22)
SODIUM SERPL-SCNC: 140 MEQ/L (ref 136–145)
WBC # BLD AUTO: 7.12 K/UL (ref 3.8–10.5)

## 2024-05-02 PROCEDURE — 80053 COMPREHEN METABOLIC PANEL: CPT

## 2024-05-02 PROCEDURE — 85025 COMPLETE CBC W/AUTO DIFF WBC: CPT

## 2024-05-02 PROCEDURE — 36415 COLL VENOUS BLD VENIPUNCTURE: CPT

## 2024-06-14 RX ORDER — FLUTICASONE PROPIONATE 50 MCG
SPRAY, SUSPENSION (ML) NASAL
Qty: 48 G | Refills: 2 | Status: SHIPPED | OUTPATIENT
Start: 2024-06-14

## 2024-08-05 RX ORDER — ROSUVASTATIN CALCIUM 5 MG/1
5 TABLET, COATED ORAL DAILY
Qty: 90 TABLET | Refills: 1 | Status: SHIPPED | OUTPATIENT
Start: 2024-08-05 | End: 2025-02-07 | Stop reason: SDUPTHER

## 2024-08-05 RX ORDER — POTASSIUM CHLORIDE 750 MG/1
TABLET, EXTENDED RELEASE ORAL
Qty: 90 TABLET | Refills: 1 | Status: SHIPPED | OUTPATIENT
Start: 2024-08-05 | End: 2025-02-03

## 2024-08-23 ENCOUNTER — TELEPHONE (OUTPATIENT)
Dept: INTERNAL MEDICINE | Facility: CLINIC | Age: 59
End: 2024-08-23
Payer: MEDICARE

## 2024-08-23 DIAGNOSIS — E78.5 HYPERLIPIDEMIA, UNSPECIFIED HYPERLIPIDEMIA TYPE: ICD-10-CM

## 2024-08-23 DIAGNOSIS — Z00.00 HEALTH CARE MAINTENANCE: Primary | ICD-10-CM

## 2024-08-23 DIAGNOSIS — E55.9 VITAMIN D DEFICIENCY: ICD-10-CM

## 2024-08-23 DIAGNOSIS — R73.09 ELEVATED GLUCOSE: ICD-10-CM

## 2024-08-23 DIAGNOSIS — R73.9 HYPERGLYCEMIA: ICD-10-CM

## 2024-08-23 NOTE — TELEPHONE ENCOUNTER
Patient is scheduled for Monday 08/26. Patient wanted to confirm if labs were needed for this appointment

## 2024-08-26 ENCOUNTER — OFFICE VISIT (OUTPATIENT)
Dept: INTERNAL MEDICINE | Facility: CLINIC | Age: 59
End: 2024-08-26
Payer: MEDICARE

## 2024-08-26 ENCOUNTER — APPOINTMENT (OUTPATIENT)
Dept: LAB | Facility: CLINIC | Age: 59
End: 2024-08-26
Attending: INTERNAL MEDICINE
Payer: MEDICARE

## 2024-08-26 VITALS
HEART RATE: 73 BPM | OXYGEN SATURATION: 98 % | HEIGHT: 57 IN | DIASTOLIC BLOOD PRESSURE: 62 MMHG | WEIGHT: 98 LBS | BODY MASS INDEX: 21.14 KG/M2 | RESPIRATION RATE: 17 BRPM | SYSTOLIC BLOOD PRESSURE: 118 MMHG

## 2024-08-26 DIAGNOSIS — E55.9 VITAMIN D DEFICIENCY: ICD-10-CM

## 2024-08-26 DIAGNOSIS — G95.9 CERVICAL MYELOPATHY (CMS/HCC): ICD-10-CM

## 2024-08-26 DIAGNOSIS — Z00.00 HEALTH CARE MAINTENANCE: ICD-10-CM

## 2024-08-26 DIAGNOSIS — M81.0 OSTEOPOROSIS WITHOUT CURRENT PATHOLOGICAL FRACTURE, UNSPECIFIED OSTEOPOROSIS TYPE: ICD-10-CM

## 2024-08-26 DIAGNOSIS — R73.09 ELEVATED GLUCOSE: ICD-10-CM

## 2024-08-26 DIAGNOSIS — K21.9 GASTROESOPHAGEAL REFLUX DISEASE WITHOUT ESOPHAGITIS: ICD-10-CM

## 2024-08-26 DIAGNOSIS — R73.9 HYPERGLYCEMIA: ICD-10-CM

## 2024-08-26 DIAGNOSIS — E78.2 MIXED HYPERLIPIDEMIA: Primary | ICD-10-CM

## 2024-08-26 DIAGNOSIS — E78.5 HYPERLIPIDEMIA, UNSPECIFIED HYPERLIPIDEMIA TYPE: ICD-10-CM

## 2024-08-26 LAB
25(OH)D3 SERPL-MCNC: 54 NG/ML (ref 30–100)
ALBUMIN SERPL-MCNC: 4.5 G/DL (ref 3.5–5.7)
ALP SERPL-CCNC: 52 IU/L (ref 34–125)
ALT SERPL-CCNC: 13 IU/L (ref 7–52)
ANION GAP SERPL CALC-SCNC: 8 MEQ/L (ref 3–15)
AST SERPL-CCNC: 18 IU/L (ref 13–39)
BASOPHILS # BLD: 0.07 K/UL (ref 0.01–0.1)
BASOPHILS NFR BLD: 1 %
BILIRUB SERPL-MCNC: 0.5 MG/DL (ref 0.3–1.2)
BUN SERPL-MCNC: 17 MG/DL (ref 7–25)
CALCIUM SERPL-MCNC: 10.2 MG/DL (ref 8.6–10.3)
CHLORIDE SERPL-SCNC: 100 MEQ/L (ref 98–107)
CHOLEST SERPL-MCNC: 150 MG/DL
CO2 SERPL-SCNC: 30 MEQ/L (ref 21–31)
CREAT SERPL-MCNC: 0.6 MG/DL (ref 0.6–1.2)
CRP SERPL-MCNC: 1.4 MG/L
DIFFERENTIAL METHOD BLD: ABNORMAL
EGFRCR SERPLBLD CKD-EPI 2021: >60 ML/MIN/1.73M*2
EOSINOPHIL # BLD: 0.25 K/UL (ref 0.04–0.36)
EOSINOPHIL NFR BLD: 3.7 %
ERYTHROCYTE [DISTWIDTH] IN BLOOD BY AUTOMATED COUNT: 12.5 % (ref 11.7–14.4)
EST. AVERAGE GLUCOSE BLD GHB EST-MCNC: 111 MG/DL
GLUCOSE SERPL-MCNC: 92 MG/DL (ref 70–99)
HBA1C MFR BLD: 5.5 %
HCT VFR BLD AUTO: 42.5 % (ref 35–45)
HDLC SERPL-MCNC: 69 MG/DL
HDLC SERPL: 2.2 {RATIO}
HGB BLD-MCNC: 14.1 G/DL (ref 11.8–15.7)
IMM GRANULOCYTES # BLD AUTO: 0.02 K/UL (ref 0–0.08)
IMM GRANULOCYTES NFR BLD AUTO: 0.3 %
LDLC SERPL CALC-MCNC: 63 MG/DL
LYMPHOCYTES # BLD: 2.16 K/UL (ref 1.2–3.5)
LYMPHOCYTES NFR BLD: 32 %
MCH RBC QN AUTO: 33.3 PG (ref 28–33.2)
MCHC RBC AUTO-ENTMCNC: 33.2 G/DL (ref 32.2–35.5)
MCV RBC AUTO: 100.2 FL (ref 83–98)
MONOCYTES # BLD: 0.53 K/UL (ref 0.28–0.8)
MONOCYTES NFR BLD: 7.9 %
NEUTROPHILS # BLD: 3.72 K/UL (ref 1.7–7)
NEUTS SEG NFR BLD: 55.1 %
NONHDLC SERPL-MCNC: 81 MG/DL
NRBC BLD-RTO: 0 %
PDW BLD AUTO: 12.1 FL (ref 9.4–12.3)
PLATELET # BLD AUTO: 320 K/UL (ref 150–369)
POTASSIUM SERPL-SCNC: 3.7 MEQ/L (ref 3.5–5.1)
PROT SERPL-MCNC: 7 G/DL (ref 6–8.2)
RBC # BLD AUTO: 4.24 M/UL (ref 3.93–5.22)
SODIUM SERPL-SCNC: 138 MEQ/L (ref 136–145)
TRIGL SERPL-MCNC: 89 MG/DL
TSH SERPL DL<=0.05 MIU/L-ACNC: 2.15 MIU/L (ref 0.34–5.6)
WBC # BLD AUTO: 6.75 K/UL (ref 3.8–10.5)

## 2024-08-26 PROCEDURE — 82306 VITAMIN D 25 HYDROXY: CPT

## 2024-08-26 PROCEDURE — 80061 LIPID PANEL: CPT

## 2024-08-26 PROCEDURE — 36415 COLL VENOUS BLD VENIPUNCTURE: CPT

## 2024-08-26 PROCEDURE — 84443 ASSAY THYROID STIM HORMONE: CPT

## 2024-08-26 PROCEDURE — 85025 COMPLETE CBC W/AUTO DIFF WBC: CPT

## 2024-08-26 PROCEDURE — 83036 HEMOGLOBIN GLYCOSYLATED A1C: CPT

## 2024-08-26 PROCEDURE — 80053 COMPREHEN METABOLIC PANEL: CPT

## 2024-08-26 PROCEDURE — 86140 C-REACTIVE PROTEIN: CPT

## 2024-08-26 PROCEDURE — 99213 OFFICE O/P EST LOW 20 MIN: CPT | Performed by: INTERNAL MEDICINE

## 2024-08-26 ASSESSMENT — ENCOUNTER SYMPTOMS
EYE PAIN: 0
FEVER: 0
DYSURIA: 0
BLOOD IN STOOL: 0
SORE THROAT: 0
FATIGUE: 0
VOMITING: 0
HEMATURIA: 0
COUGH: 0
NUMBNESS: 0
DIARRHEA: 0
HEADACHES: 0
NECK PAIN: 1
CONFUSION: 0
SHORTNESS OF BREATH: 0
BACK PAIN: 0
NAUSEA: 0
PALPITATIONS: 0
BRUISES/BLEEDS EASILY: 0
CHILLS: 0
ABDOMINAL PAIN: 0
DIZZINESS: 0

## 2024-08-26 NOTE — PATIENT INSTRUCTIONS
PLAN      Get blood work today  Drink lots of fluids  Saline nasal spray daily  Cont the meds   Follow up with Dr Castellon  Flu vaccine and COVID booster in the fall  See me in 6 months

## 2024-08-26 NOTE — ASSESSMENT & PLAN NOTE
Fair control  She will continue the rosuvastatin 5 mg p.o. daily  Discussed dietary changes  Increase fluid intake  Labs ordered

## 2024-08-26 NOTE — PROGRESS NOTES
Subjective      Patient ID: Rajwinder Wynne is a 58 y.o. female     HPI        Here for follow up on chronic conditions and ongoing issues  Has had neck  pain  radiating down due to overuse   Saw Dr Castellon and had an MRI C spine recently and will follow up with him  Is wearing a cervical collar for support  Also saw Dr. Beck [place] for back and neck pain and had an  MRI of the lumbar spine         Past Medical History:   Diagnosis Date    Allergic rhinitis     Cerebral cavernoma     parietal cavernoma    Cervical myelopathy (CMS/HCC)     Cervical radiculopathy     Cervicogenic headache     Closed fracture of sesamoid bone of foot     Cricopharyngeal dysphagia     Fibroids     Food allergy     Fractured toe     GERD (gastroesophageal reflux disease)     Lipid disorder     Nephrolithiasis     Osteoporosis        Past Surgical History:   Procedure Laterality Date    ANTERIOR FUSION CERVICAL SPINE      c6-7    BUNIONECTOMY      CRICOPHARYNGEAL MYOTOMY      KNEE SURGERY      LUMBAR FUSION      L5-S1    MYOMECTOMY      THROAT SURGERY      TIBIAL SESAMOID EXCISION         Family History   Problem Relation Age of Onset    Hyperlipidemia Biological Mother     Hypertension Biological Mother     Pulmonary fibrosis Biological Father     Heart attack Biological Father     Colon cancer Maternal Grandfather     Breast cancer Mother's Sister     Ovarian cancer Father's Sister        Social History     Socioeconomic History    Marital status:      Spouse name: Not on file    Number of children: Not on file    Years of education: Not on file    Highest education level: Not on file   Occupational History    Not on file   Tobacco Use    Smoking status: Never    Smokeless tobacco: Never   Vaping Use    Vaping Use: Never used   Substance and Sexual Activity    Alcohol use: No    Drug use: No    Sexual activity: Not on file   Other Topics Concern    Not on file   Social History Narrative    Not on file     Social Determinants of  Health     Financial Resource Strain: Not on file   Food Insecurity: No Food Insecurity (1/3/2023)    Hunger Vital Sign     Worried About Running Out of Food in the Last Year: Never true     Ran Out of Food in the Last Year: Never true   Transportation Needs: Not on file   Physical Activity: Not on file   Stress: Not on file   Social Connections: Not on file   Intimate Partner Violence: Not on file   Housing Stability: Not on file       Allergies   Allergen Reactions    Liriano Flavor Other (see comments)    Shellfish Containing Products Anaphylaxis     Pumpkin seeds    Adhesive      Steri strips    [place] Other (see comments)    Banana Other (see comments)     also allergic to almonds, berries    Erythromycin     Erythromycin Base Other (see comments)     GI Upset  Other reaction(s): GI side effects    Iodine Other (see comments)     shell fish allergy    Latex Other (see comments)     patient not sure    Levonorgestrel-Ethinyl Estrad     Naprelan     Naproxen Other (see comments)     dysphagia throat burning    Naproxen Sodium      Other reaction(s): lump in throat    Penicillin G     Penicillin V Potassium Hives    Penicillins Hives    Pineapple Other (see comments)    Pumpkin Seed      Other reaction(s): throat gets tight    Shellfish Derived Hives       Current Outpatient Medications   Medication Sig Dispense Refill    cholecalciferol, vitamin D3, 25 mcg (1,000 unit) capsule Take 1,000 Units by mouth daily.      denosumab (PROLIA) 60 mg/mL syringe Inject 60 mg under the skin once.      fluticasone propionate (FLONASE) 50 mcg/actuation nasal spray USE 1 SPRAY IN EACH NOSTRIL DAILY 48 g 2    folic acid/multivit,iron, (CENTRUM ORAL) Take 1 tablet by mouth daily.      gabapentin (NEURONTIN) 300 mg capsule Take 900 mg by mouth 3 (three) times a day.        hydrochlorothiazide (HYDRODIURIL) 25 mg tablet TAKE 1 TABLET DAILY 90 tablet 3    meloxicam (MOBIC) 7.5 mg tablet Take 7.5 mg by mouth as needed for pain.       "pantoprazole (PROTONIX) 40 mg EC tablet TAKE 1 TABLET DAILY 90 tablet 1    potassium chloride (KLOR-CON M) 10 mEq CR tablet TAKE 1 TABLET DAILY 90 tablet 1    rosuvastatin (CRESTOR) 5 mg tablet Take 1 tablet (5 mg total) by mouth daily. 90 tablet 1    azelastine (ASTELIN) 137 mcg (0.1 %) nasal spray Administer 1 spray into each nostril 2 (two) times a day. Use in each nostril as directed. 30 mL 0    ondansetron ODT (ZOFRAN-ODT) 4 mg disintegrating tablet Take 1 tablet (4 mg total) by mouth every 12 (twelve) hours as needed for nausea or vomiting for up to 7 days. 14 tablet 0     No current facility-administered medications for this visit.       Visit Vitals  /62   Pulse 73   Resp 17   Ht 1.448 m (4' 9\")   Wt 44.5 kg (98 lb)   SpO2 98%   BMI 21.21 kg/m²         The following have been reviewed and updated as appropriate in this visit:   Allergies  Meds  Problems       Review of Systems   Constitutional:  Negative for chills, fatigue and fever.   HENT:  Negative for ear pain and sore throat.    Eyes:  Negative for pain and visual disturbance.   Respiratory:  Negative for cough and shortness of breath.    Cardiovascular:  Negative for chest pain, palpitations and leg swelling.   Gastrointestinal:  Negative for abdominal pain, blood in stool, diarrhea, nausea and vomiting.   Genitourinary:  Negative for dysuria and hematuria.   Musculoskeletal:  Positive for neck pain. Negative for back pain.   Skin:  Negative for rash.   Allergic/Immunologic: Negative for environmental allergies.   Neurological:  Negative for dizziness, numbness and headaches.   Hematological:  Does not bruise/bleed easily.   Psychiatric/Behavioral:  Negative for confusion.        Objective       Physical Exam  Vitals and nursing note reviewed.   Constitutional:       Appearance: She is well-developed.   HENT:      Head: Normocephalic and atraumatic.      Right Ear: External ear normal.      Left Ear: External ear normal.      Nose: Nose " normal.   Eyes:      Conjunctiva/sclera: Conjunctivae normal.      Pupils: Pupils are equal, round, and reactive to light.   Neck:      Thyroid: No thyromegaly.   Cardiovascular:      Rate and Rhythm: Normal rate and regular rhythm.      Heart sounds: Normal heart sounds.   Pulmonary:      Effort: Pulmonary effort is normal. No respiratory distress.      Breath sounds: Normal breath sounds.   Chest:      Chest wall: No tenderness.   Abdominal:      General: Bowel sounds are normal.      Palpations: Abdomen is soft.      Tenderness: There is no abdominal tenderness. There is no guarding.   Musculoskeletal:         General: No tenderness. Normal range of motion.      Cervical back: Normal range of motion and neck supple.      Comments: Wearing soft cervical collar   Lymphadenopathy:      Cervical: No cervical adenopathy.   Skin:     General: Skin is warm and dry.      Findings: No rash.   Neurological:      Mental Status: She is alert and oriented to person, place, and time.      Cranial Nerves: No cranial nerve deficit.      Sensory: No sensory deficit.   Psychiatric:         Behavior: Behavior normal.         Assessment/Plan     Hyperlipidemia  Fair control  She will continue the rosuvastatin 5 mg p.o. daily  Discussed dietary changes  Increase fluid intake  Labs ordered    Cervical myelopathy (CMS/HCC)  Had a flareup recently due to lifting and overuse  She saw Dr. Castellon and had an MRI C spine and will follow up with him  Cont to wear cervical collar for support    GERD (gastroesophageal reflux disease)  Stable  She uses pantoprazole 40 mg as needed    Vitamin D deficiency  Stable  Continue vitamin D3 1000 units daily    Osteoporosis without current pathological fracture  Fair control  She is on prolia every 6 months and sees dr Crow  Discussed calcium and vitamin D supplementation        Maya Tesfaye MD  8/26/2024  7:33 PM

## 2024-08-26 NOTE — ASSESSMENT & PLAN NOTE
Had a flareup recently due to lifting and overuse  She saw Dr. Castellon and had an MRI C spine and will follow up with him  Cont to wear cervical collar for support

## 2024-09-30 ENCOUNTER — TELEPHONE (OUTPATIENT)
Dept: SCHEDULING | Facility: CLINIC | Age: 59
End: 2024-09-30
Payer: MEDICARE

## 2024-09-30 NOTE — TELEPHONE ENCOUNTER
New Patient Appointment Request    Name of caller: Rajwinder    Reason for Visit: Dr. Gaona Pt/Reestablishing with a cardiologist.    Insurance: Medicare    Recent Cardiac Test/Procedures: No    Referred by: Self    Primary Care Physician: Dr. Maya Tesfaye    Previous Cardiologist name and phone number: Dr. Gaona    Best contact number: [phone]     Additional notes/History: Pt is scheduled 4/7/25

## 2024-10-15 ENCOUNTER — CLINICAL SUPPORT (OUTPATIENT)
Dept: INTERNAL MEDICINE | Facility: CLINIC | Age: 59
End: 2024-10-15
Payer: MEDICARE

## 2024-10-15 DIAGNOSIS — Z23 NEED FOR INFLUENZA VACCINATION: Primary | ICD-10-CM

## 2024-10-15 PROCEDURE — 96372 THER/PROPH/DIAG INJ SC/IM: CPT | Performed by: INTERNAL MEDICINE

## 2024-10-15 PROCEDURE — G0008 ADMIN INFLUENZA VIRUS VAC: HCPCS | Performed by: INTERNAL MEDICINE

## 2024-10-15 PROCEDURE — 90656 IIV3 VACC NO PRSV 0.5 ML IM: CPT | Performed by: INTERNAL MEDICINE

## 2024-10-25 RX ORDER — PANTOPRAZOLE SODIUM 40 MG/1
TABLET, DELAYED RELEASE ORAL
Qty: 90 TABLET | Refills: 3 | Status: SHIPPED | OUTPATIENT
Start: 2024-10-25

## 2024-11-04 RX ORDER — HYDROCHLOROTHIAZIDE 25 MG/1
TABLET ORAL
Qty: 90 TABLET | Refills: 3 | Status: SHIPPED | OUTPATIENT
Start: 2024-11-04

## 2024-12-19 ENCOUNTER — TRANSCRIBE ORDERS (OUTPATIENT)
Dept: SCHEDULING | Age: 59
End: 2024-12-19

## 2024-12-19 DIAGNOSIS — M81.0 AGE-RELATED OSTEOPOROSIS WITHOUT CURRENT PATHOLOGICAL FRACTURE: Primary | ICD-10-CM

## 2025-02-03 RX ORDER — POTASSIUM CHLORIDE 750 MG/1
TABLET, EXTENDED RELEASE ORAL
Qty: 90 TABLET | Refills: 1 | Status: SHIPPED | OUTPATIENT
Start: 2025-02-03

## 2025-02-07 ENCOUNTER — TELEPHONE (OUTPATIENT)
Dept: INTERNAL MEDICINE | Facility: CLINIC | Age: 60
End: 2025-02-07
Payer: MEDICARE

## 2025-02-07 RX ORDER — ROSUVASTATIN CALCIUM 5 MG/1
5 TABLET, COATED ORAL DAILY
Qty: 90 TABLET | Refills: 1 | Status: SHIPPED | OUTPATIENT
Start: 2025-02-07 | End: 2025-05-08

## 2025-02-25 PROCEDURE — 99490 CHRNC CARE MGMT STAFF 1ST 20: CPT

## 2025-02-28 ENCOUNTER — OFFICE VISIT (OUTPATIENT)
Dept: INTERNAL MEDICINE | Facility: CLINIC | Age: 60
End: 2025-02-28
Payer: MEDICARE

## 2025-02-28 VITALS
HEIGHT: 57 IN | WEIGHT: 91 LBS | DIASTOLIC BLOOD PRESSURE: 68 MMHG | RESPIRATION RATE: 17 BRPM | HEART RATE: 83 BPM | BODY MASS INDEX: 19.63 KG/M2 | OXYGEN SATURATION: 98 % | SYSTOLIC BLOOD PRESSURE: 98 MMHG

## 2025-02-28 DIAGNOSIS — J06.9 VIRAL URI: Primary | ICD-10-CM

## 2025-02-28 DIAGNOSIS — G95.9 CERVICAL MYELOPATHY (CMS/HCC): ICD-10-CM

## 2025-02-28 PROCEDURE — 99213 OFFICE O/P EST LOW 20 MIN: CPT | Performed by: INTERNAL MEDICINE

## 2025-03-03 ENCOUNTER — HOSPITAL ENCOUNTER (OUTPATIENT)
Dept: RADIOLOGY | Facility: CLINIC | Age: 60
Discharge: HOME | End: 2025-03-03
Attending: INTERNAL MEDICINE
Payer: MEDICARE

## 2025-03-03 DIAGNOSIS — M81.0 AGE-RELATED OSTEOPOROSIS WITHOUT CURRENT PATHOLOGICAL FRACTURE: ICD-10-CM

## 2025-03-03 PROCEDURE — 77080 DXA BONE DENSITY AXIAL: CPT

## 2025-03-04 PROCEDURE — 99490 CHRNC CARE MGMT STAFF 1ST 20: CPT

## 2025-03-14 ENCOUNTER — TELEPHONE (OUTPATIENT)
Dept: INTERNAL MEDICINE | Facility: CLINIC | Age: 60
End: 2025-03-14

## 2025-03-14 NOTE — TELEPHONE ENCOUNTER
Patient called in requesting a call back anytime after 2 pm. She is at her mother's  right now but states if a nurse could give her a brief call before the end of the day as she is experiencing some SOB. I told patient we typically advise the ER for symptoms like that but she states she can't go today because of the . Please advise.

## 2025-03-15 ENCOUNTER — APPOINTMENT (OUTPATIENT)
Dept: RADIOLOGY | Age: 60
End: 2025-03-15
Attending: NURSE PRACTITIONER
Payer: MEDICARE

## 2025-03-15 ENCOUNTER — HOSPITAL ENCOUNTER (OUTPATIENT)
Facility: CLINIC | Age: 60
Discharge: HOME | End: 2025-03-15
Attending: FAMILY MEDICINE
Payer: MEDICARE

## 2025-03-15 VITALS
DIASTOLIC BLOOD PRESSURE: 80 MMHG | OXYGEN SATURATION: 99 % | TEMPERATURE: 99.3 F | RESPIRATION RATE: 16 BRPM | SYSTOLIC BLOOD PRESSURE: 132 MMHG | HEART RATE: 94 BPM

## 2025-03-15 DIAGNOSIS — J20.9 ACUTE BRONCHITIS, UNSPECIFIED ORGANISM: Primary | ICD-10-CM

## 2025-03-15 RX ORDER — BENZONATATE 100 MG/1
100 CAPSULE ORAL
Qty: 21 CAPSULE | Refills: 0 | Status: SHIPPED | OUTPATIENT
Start: 2025-03-15 | End: 2025-03-25

## 2025-03-15 RX ORDER — PREDNISONE 20 MG/1
20 TABLET ORAL DAILY
Qty: 5 TABLET | Refills: 0 | Status: SHIPPED | OUTPATIENT
Start: 2025-03-15 | End: 2025-03-20

## 2025-03-15 RX ORDER — ALBUTEROL SULFATE 90 UG/1
2 INHALANT RESPIRATORY (INHALATION) EVERY 4 HOURS PRN
Qty: 1 G | Refills: 0 | Status: SHIPPED | OUTPATIENT
Start: 2025-03-15 | End: 2025-04-14

## 2025-03-15 ASSESSMENT — ENCOUNTER SYMPTOMS
NAUSEA: 0
COUGH: 1
EYE ITCHING: 0
MYALGIAS: 0
CHILLS: 0
BACK PAIN: 1
SHORTNESS OF BREATH: 0
EYE DISCHARGE: 0
FATIGUE: 1
VOMITING: 0
SORE THROAT: 1
EYE REDNESS: 0
FEVER: 0
DIARRHEA: 1
HEADACHES: 1
WHEEZING: 0

## 2025-03-16 NOTE — ED ATTESTATION NOTE
I was immediately available to provide supervision and direction for the care of the patient. I agree with the plan based on the documented history and physical findings.       Dio Tolliver MD  03/16/25 0803

## 2025-03-18 ENCOUNTER — OFFICE VISIT (OUTPATIENT)
Dept: INTERNAL MEDICINE | Facility: CLINIC | Age: 60
End: 2025-03-18
Payer: MEDICARE

## 2025-03-18 VITALS
TEMPERATURE: 98.4 F | SYSTOLIC BLOOD PRESSURE: 126 MMHG | DIASTOLIC BLOOD PRESSURE: 82 MMHG | HEIGHT: 59 IN | HEART RATE: 84 BPM | BODY MASS INDEX: 18.39 KG/M2 | RESPIRATION RATE: 17 BRPM | OXYGEN SATURATION: 97 % | WEIGHT: 91.2 LBS

## 2025-03-18 DIAGNOSIS — R05.8 POST-VIRAL COUGH SYNDROME: Primary | ICD-10-CM

## 2025-03-18 PROCEDURE — 99213 OFFICE O/P EST LOW 20 MIN: CPT | Performed by: INTERNAL MEDICINE

## 2025-03-18 NOTE — PROGRESS NOTES
SUBJECTIVE:   59 y.o. female for a same day visit    URI/cough: I saw the patient on 2/28 for a URI with cough which did not resolve after about two weeks and was accompanied by some intermittent sob, this prompted her to go  on 3/15 for cxr which was also negative she was diagnosed presumed acute bronchitis with neg cxr and recommended she start tessalon perles and prednisone x 5 days however she did nto start this as she was not sure if she needed it.  Today she generally feels well and her shortness of breath has completely resolved however she continues to have a frequent dry cough that is triggered by a tickle in her throat.  On ongoing fevers.  Not much benefit from albuterol inhaler, no hx of asthma but does tend to get lingering cough.    Past Medical History:   Diagnosis Date    Allergic rhinitis     Cerebral cavernoma     parietal cavernoma    Cervical myelopathy (CMS/HCC)     Cervical radiculopathy     Cervicogenic headache     Closed fracture of sesamoid bone of foot     Cricopharyngeal dysphagia     Fibroids     Food allergy     Fractured toe     GERD (gastroesophageal reflux disease)     Lipid disorder     Nephrolithiasis     Osteoporosis      Patient Active Problem List   Diagnosis    Cricopharyngeal dysphagia    Hyperglycemia    Vitamin D deficiency    Medicare annual wellness visit, subsequent    Osteoporosis without current pathological fracture    Elevated blood pressure reading in office without diagnosis of hypertension    Cerebral cavernoma    Otalgia of left ear    Acute sinusitis    Hyperlipidemia    Cervical myelopathy (CMS/HCC)    Allergy to shellfish    History of URI (upper respiratory infection)    Pain in both knees    Left foot pain    Allergy history, drug    Cervicogenic headache    Chronic pain syndrome    Chronic post-traumatic headache    Dizziness and giddiness    Food allergy    Hoarseness    Nephrolithiasis    Leiomyoma of uterus    Neck pain    Papanicolaou smear of cervix  with low grade squamous intraepithelial lesion (LGSIL)    Spinal cord disorder (CMS/HCC)    Mouth sores    Family history of atherosclerosis    Pain of toe of right foot    Nasal congestion    Ear congestion    Sinusitis    Muscle cramps    Hypercalcemia    GERD (gastroesophageal reflux disease)    Pharyngoesophageal dysphagia    Right shoulder pain    Elevated platelet count     Social History     Tobacco Use    Smoking status: Never    Smokeless tobacco: Never   Vaping Use    Vaping status: Never Used   Substance Use Topics    Alcohol use: No    Drug use: No       Current Outpatient Medications:     albuterol HFA 90 mcg/actuation inhaler, Inhale 2 puffs every 4 (four) hours as needed for wheezing., Disp: 1 g, Rfl: 0    benzonatate (TESSALON) 100 mg capsule, Take 1 capsule (100 mg total) by mouth every 8 (eight) hours for 10 days., Disp: 21 capsule, Rfl: 0    cholecalciferol, vitamin D3, 25 mcg (1,000 unit) capsule, Take 1,000 Units by mouth daily., Disp: , Rfl:     denosumab (PROLIA) 60 mg/mL syringe, Inject 60 mg under the skin once., Disp: , Rfl:     folic acid/multivit,iron, (CENTRUM ORAL), Take 1 tablet by mouth daily., Disp: , Rfl:     gabapentin (NEURONTIN) 300 mg capsule, Take 900 mg by mouth 3 (three) times a day.  , Disp: , Rfl:     hydrochlorothiazide (HYDRODIURIL) 25 mg tablet, TAKE 1 TABLET DAILY, Disp: 90 tablet, Rfl: 3    meloxicam (MOBIC) 7.5 mg tablet, Take 7.5 mg by mouth as needed for pain., Disp: , Rfl:     pantoprazole (PROTONIX) 40 mg EC tablet, TAKE 1 TABLET DAILY, Disp: 90 tablet, Rfl: 3    potassium chloride (KLOR-CON M) 10 mEq CR tablet, TAKE 1 TABLET DAILY, Disp: 90 tablet, Rfl: 1    rosuvastatin (CRESTOR) 5 mg tablet, Take 1 tablet (5 mg total) by mouth daily., Disp: 90 tablet, Rfl: 1    azelastine (ASTELIN) 137 mcg (0.1 %) nasal spray, Administer 1 spray into each nostril 2 (two) times a day. Use in each nostril as directed. (Patient not taking: Reported on 3/18/2025), Disp: 30 mL,  "Rfl: 0    fluticasone propionate (FLONASE) 50 mcg/actuation nasal spray, USE 1 SPRAY IN EACH NOSTRIL DAILY (Patient not taking: Reported on 3/18/2025), Disp: 48 g, Rfl: 2    predniSONE (DELTASONE) 20 mg tablet, Take 1 tablet (20 mg total) by mouth daily for 5 days. (Patient not taking: Reported on 3/18/2025), Disp: 5 tablet, Rfl: 0   Allergies   Allergen Reactions    Liriano Flavor Other (see comments)    Shellfish Containing Products Anaphylaxis     Pumpkin seeds    Adhesive      Steri strips    [place] Other (see comments)    Banana Other (see comments)     also allergic to almonds, berries    Erythromycin     Erythromycin Base Other (see comments)     GI Upset  Other reaction(s): GI side effects    Iodine Other (see comments)     shell fish allergy    Latex Other (see comments)     patient not sure    Levonorgestrel-Ethinyl Estrad     Naprelan     Naproxen Other (see comments)     dysphagia throat burning    Naproxen Sodium      Other reaction(s): lump in throat    Penicillin G     Penicillin V Potassium Hives    Penicillins Hives    Pineapple Other (see comments)    Pumpkin Seed      Other reaction(s): throat gets tight    Shellfish Derived Hives       ROS negative unless otherwise specified    OBJECTIVE:  Visit Vitals  /82 (BP Location: Left upper arm, Patient Position: Sitting)   Pulse 84   Temp 36.9 °C (98.4 °F) (Oral)   Resp 17   Ht 1.499 m (4' 11\")   Wt 41.4 kg (91 lb 3.2 oz)   SpO2 97%   BMI 18.42 kg/m²      Gait:  Normal  Alert and well appearing   Awake and oriented with normal affect and appropriate behavior   HEENT: Normocephalic and atraumatic, pupils equal, round, OP clear with moist mucous membranes, mild nasal congestion on L, deviated septum to the R  Lungs: Normal breath sounds, lungs CTA with no RRW  Heart:Regular rate rhythm with no murmurs, gallops or rubs   Skin:  No lesions on exposed skin    Assessment/Plan     1. Post-viral cough syndrome (Primary)  Advised supportive care, tea with " honey good hydration and otc cough medicine, if not improving by end of next week it would be reasonable to try her steroids as 4 weeks is generally longer than would be expected with post viral cough or bronchitis

## 2025-03-25 ENCOUNTER — HOSPITAL ENCOUNTER (EMERGENCY)
Facility: HOSPITAL | Age: 60
Discharge: HOME | End: 2025-03-25
Attending: EMERGENCY MEDICINE | Admitting: EMERGENCY MEDICINE
Payer: MEDICARE

## 2025-03-25 ENCOUNTER — APPOINTMENT (EMERGENCY)
Dept: RADIOLOGY | Facility: HOSPITAL | Age: 60
End: 2025-03-25
Payer: MEDICARE

## 2025-03-25 VITALS
RESPIRATION RATE: 18 BRPM | BODY MASS INDEX: 19.63 KG/M2 | SYSTOLIC BLOOD PRESSURE: 145 MMHG | HEIGHT: 57 IN | TEMPERATURE: 97.6 F | DIASTOLIC BLOOD PRESSURE: 88 MMHG | WEIGHT: 91 LBS | OXYGEN SATURATION: 99 % | HEART RATE: 64 BPM
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
## 2025-03-25 DIAGNOSIS — S62.101A CLOSED FRACTURE OF RIGHT WRIST, INITIAL ENCOUNTER: ICD-10-CM

## 2025-03-25 PROCEDURE — 25600 CLTX DST RDL FX/EPHYS SEP WO: CPT | Mod: LT

## 2025-03-25 PROCEDURE — 73110 X-RAY EXAM OF WRIST: CPT | Mod: RT

## 2025-03-25 PROCEDURE — 99283 EMERGENCY DEPT VISIT LOW MDM: CPT | Mod: 25

## 2025-03-25 PROCEDURE — 2W38X1Z IMMOBILIZATION OF RIGHT UPPER EXTREMITY USING SPLINT: ICD-10-PCS | Performed by: EMERGENCY MEDICINE

## 2025-03-25 RX ORDER — ONDANSETRON 4 MG/1
4 TABLET, ORALLY DISINTEGRATING ORAL EVERY 8 HOURS PRN
Qty: 12 TABLET | Refills: 0 | Status: SHIPPED | OUTPATIENT
Start: 2025-03-25

## 2025-03-25 RX ORDER — ACETAMINOPHEN 325 MG/1
650 TABLET ORAL ONCE
Status: DISCONTINUED | OUTPATIENT
Start: 2025-03-25 | End: 2025-03-25 | Stop reason: HOSPADM

## 2025-03-25 RX ORDER — HYDROCODONE BITARTRATE AND ACETAMINOPHEN 5; 325 MG/1; MG/1
1 TABLET ORAL EVERY 6 HOURS PRN
Qty: 10 TABLET | Refills: 0 | Status: SHIPPED | OUTPATIENT
Start: 2025-03-25

## 2025-03-25 ASSESSMENT — ENCOUNTER SYMPTOMS
HEADACHES: 0
CHEST TIGHTNESS: 0
JOINT SWELLING: 1
WOUND: 0
NUMBNESS: 0
DIZZINESS: 0
ARTHRALGIAS: 1
FEVER: 0
FATIGUE: 0
SHORTNESS OF BREATH: 0

## 2025-03-25 NOTE — ED ATTESTATION NOTE
Procedures  Physical Exam  Review of Systems  Medical Decision Making  Risk  OTC drugs.        3/25/00158:42 AM  I have personally seen and examined the patient. I was involved in the care and medical decision making for this patient.    I reviewed and agree with the PA/NP/Resident's assessment and plan of care, with any exceptions as documented below.    My focused history, examination, assessment, and plan of care of Rajwinder Wynne is as follows:  The patient presents with right wrist pain status post a fall.  Exam: Minimal swelling to the right distal wrist and forearm  Impression/Plan/Medical Decision Making: X-ray, splint, follow-up with hand/Ortho    Vital Signs Review: Vital signs have been reviewed. The oxygen saturation is  SpO2: 99 % which is normal.    I was physically present for the key/critical portions of the following procedures: None    This document was created using Dragon dictation software.  There might be some typographical errors due to this technology.    We are in a period of time with increased volumes and decreased capacity.      Marcellus Cruz MD  03/25/25 0156

## 2025-03-25 NOTE — ED PROVIDER NOTES
Emergency Medicine Note  HPI   HISTORY OF PRESENT ILLNESS     59-year-old female presents to the emergency department stating she tripped and fell backwards while walking her dog injuring her right wrist.  She denies striking her head.  She has chronic neck and back pain and is scheduled for back surgery next month but states this pain is the same as she always has.  She drove herself to the emergency department.  She took a Mobic prior to arrival.  She denies numbness or tingling to the fingertips.      History provided by:  Patient   used: No    Trauma  Mechanism of injury: Fall  Injury location: shoulder/arm  Injury location detail: R wrist  Incident location: home  Time since incident: Just prior to arrival.  Arrived directly from scene: yes     Current symptoms:       Associated symptoms:             Denies headache.         Patient History   PAST HISTORY     Reviewed from Nursing Triage:       Past Medical History:   Diagnosis Date    Allergic rhinitis     Cerebral cavernoma     parietal cavernoma    Cervical myelopathy (CMS/HCC)     Cervical radiculopathy     Cervicogenic headache     Closed fracture of sesamoid bone of foot     Cricopharyngeal dysphagia     Fibroids     Food allergy     Fractured toe     GERD (gastroesophageal reflux disease)     Lipid disorder     Nephrolithiasis     Osteoporosis        Past Surgical History   Procedure Laterality Date    Anterior fusion cervical spine      c6-7    Bunionectomy      Cricopharyngeal myotomy      DIAGNOSTIC UPPER GASTROINTESTINAL ENDOSCOPY WITH COLLECTION OF SPECIMEN BY WASHING N/A 2/5/2021    Performed by [place] Ordaz [place] [facility]    FLEXIBLE COLONOSCOPY PROXIMAL TO SPLENIC FLEXURE WITH REMOVAL OF TUMOR USING SNARE N/A 2/5/2021    Performed by [facility]az [place] [facility]    Knee surgery      Lumbar fusion      L5-S1    Myomectomy      Throat surgery      Tibial sesamoid excision         Family History   Problem  Relation Name Age of Onset    Hyperlipidemia Biological Mother      Hypertension Biological Mother      Pulmonary fibrosis Biological Father      Heart attack Biological Father      Colon cancer Maternal Grandfather      Breast cancer Mother's Sister      Ovarian cancer Father's Sister         Social History     Tobacco Use    Smoking status: Never    Smokeless tobacco: Never   Vaping Use    Vaping status: Never Used   Substance Use Topics    Alcohol use: No    Drug use: No         Review of Systems   REVIEW OF SYSTEMS     Review of Systems   Constitutional:  Negative for fatigue and fever.   Respiratory:  Negative for chest tightness and shortness of breath.    Musculoskeletal:  Positive for arthralgias and joint swelling.   Skin:  Negative for rash and wound.   Neurological:  Negative for dizziness, numbness and headaches.   All other systems reviewed and are negative.        VITALS     ED Vitals      Date/Time Temp Pulse Resp BP SpO2 [facility]   03/25/25 0822 36.4 °C (97.6 °F) 64 18 145/88 99 % MMV          Pulse Ox %: 99 % (03/25/25 0831)  Pulse Ox Interpretation: Normal (03/25/25 0831)           Physical Exam   PHYSICAL EXAM     Physical Exam  Vitals and nursing note reviewed.   Constitutional:       Appearance: Normal appearance.   HENT:      Head: Normocephalic and atraumatic.   Cardiovascular:      Rate and Rhythm: Normal rate and regular rhythm.      Pulses: Normal pulses.      Heart sounds: Normal heart sounds.   Pulmonary:      Effort: Pulmonary effort is normal.      Breath sounds: Normal breath sounds.   Musculoskeletal:         General: Tenderness present.      Comments: Tenderness on palpation of the right wrist.  Mild swelling noted.  No deformity.  No pain on palpation of the elbow or the shoulder.  Normal peripheral pulses and normal sensation.   Skin:     General: Skin is warm and dry.      Capillary Refill: Capillary refill takes less than 2 seconds.      Findings: No erythema or rash.   Neurological:       General: No focal deficit present.      Mental Status: She is alert and oriented to person, place, and time.   Psychiatric:         Mood and Affect: Mood normal.           PROCEDURES     Procedures     DATA     Results       None            Imaging Results              X-RAY WRIST RIGHT 3+ VIEWS (Final result)  Result time 03/25/25 08:59:14      Final result                   Impression:    IMPRESSION:    Comminuted mildly disc dorsally angulated distal radial fracture.               Narrative:    CLINICAL HISTORY: Status post fall.    COMMENT: 4 views of the right wrist are performed. Study is compare with prior  examination performed on January 17, 2020.    Diffuse osteopenia is noted. Comminuted mildly displaced dorsally angulated  distal radial fracture is seen. No other fracture is identified.                                      No orders to display       Scoring tools                                  ED Course & MDM   MDM / ED COURSE / CLINICAL IMPRESSION / DISPO     Medical Decision Making  59-year-old female who presents to the emergency department after falling and injuring her right wrist.  X-rays showing a comminuted fracture of the distal radius.  She was placed in a fiberglass volar splint for immobilization, neurovascularly intact.  Shoulder sling given.  Advise follow-up with hand surgery.  Patient prescribed Zofran and Vicodin.  Return to the ER for any worsening symptoms.  Patient comfortable with plan.    Problems Addressed:  Closed fracture of right wrist, initial encounter: acute illness or injury  Fall, initial encounter: acute illness or injury    Amount and/or Complexity of Data Reviewed  Radiology: ordered and independent interpretation performed. Decision-making details documented in ED Course.    Risk  OTC drugs.  Prescription drug management.        ED Course as of 03/25/25 0945   Tue Mar 25, 2025   0902 X-RAY WRIST RIGHT 3+ VIEWS  IMPRESSION:     Comminuted mildly disc dorsally  angulated distal radial fracture.   [CB]      ED Course User Index  [CB] Padma Israel PA     Clinical Impression      Fall, initial encounter   Closed fracture of right wrist, initial encounter     _________________       ED Disposition   Discharge                       Padma Israel PA  03/25/25 0981

## 2025-03-25 NOTE — DISCHARGE INSTRUCTIONS
Your x-rays showing a distal radius fracture.  You were placed in a splint.  Do not get this wet.  Leave the splint in place until seen in follow-up by the hand doctor.  Call their office to schedule an appointment.  Return to the emergency department for any worsening symptoms.  Keep arm elevated and you can apply ice for 15 minutes at a time.

## 2025-04-09 ENCOUNTER — TELEPHONE (OUTPATIENT)
Dept: INTERNAL MEDICINE | Facility: CLINIC | Age: 60
End: 2025-04-09
Payer: MEDICARE

## 2025-04-11 PROCEDURE — 99490 CHRNC CARE MGMT STAFF 1ST 20: CPT

## 2025-05-15 ENCOUNTER — TELEPHONE (OUTPATIENT)
Dept: INTERNAL MEDICINE | Facility: CLINIC | Age: 60
End: 2025-05-15
Payer: MEDICARE

## 2025-07-01 ENCOUNTER — APPOINTMENT (OUTPATIENT)
Dept: LAB | Facility: CLINIC | Age: 60
End: 2025-07-01
Attending: INTERNAL MEDICINE
Payer: MEDICARE

## 2025-07-01 DIAGNOSIS — M46.1 SACROILIITIS, NOT ELSEWHERE CLASSIFIED (CMS/HCC): ICD-10-CM

## 2025-07-01 DIAGNOSIS — M48.061 SPINAL STENOSIS, LUMBAR REGION, WITHOUT NEUROGENIC CLAUDICATION: ICD-10-CM

## 2025-07-01 DIAGNOSIS — M81.0 SENILE OSTEOPOROSIS: ICD-10-CM

## 2025-07-01 DIAGNOSIS — E78.5 HYPERLIPIDEMIA: ICD-10-CM

## 2025-07-01 DIAGNOSIS — M15.9 GENERALIZED OSTEOARTHROSIS, INVOLVING MULTIPLE SITES: ICD-10-CM

## 2025-07-01 DIAGNOSIS — R53.83 FATIGUE: ICD-10-CM

## 2025-07-01 LAB
25(OH)D3 SERPL-MCNC: 63 NG/ML (ref 30–100)
ALBUMIN SERPL-MCNC: 4.3 G/DL (ref 3.5–5.7)
ALP SERPL-CCNC: 60 IU/L (ref 34–125)
ALT SERPL-CCNC: 11 IU/L (ref 7–52)
ANION GAP SERPL CALC-SCNC: 6 MEQ/L (ref 3–15)
AST SERPL-CCNC: 17 IU/L (ref 13–39)
BASOPHILS # BLD: 0.07 K/UL (ref 0.01–0.1)
BASOPHILS NFR BLD: 1.1 %
BILIRUB SERPL-MCNC: 0.5 MG/DL (ref 0.3–1.2)
BUN SERPL-MCNC: 20 MG/DL (ref 7–25)
CALCIUM SERPL-MCNC: 9.9 MG/DL (ref 8.6–10.3)
CHLORIDE SERPL-SCNC: 99 MEQ/L (ref 98–107)
CHOLEST SERPL-MCNC: 158 MG/DL
CO2 SERPL-SCNC: 32 MEQ/L (ref 21–31)
CREAT SERPL-MCNC: 0.6 MG/DL (ref 0.6–1.2)
DIFFERENTIAL METHOD BLD: ABNORMAL
EGFRCR SERPLBLD CKD-EPI 2021: >60 ML/MIN/1.73M*2
EOSINOPHIL # BLD: 0.29 K/UL (ref 0.04–0.36)
EOSINOPHIL NFR BLD: 4.4 %
ERYTHROCYTE [DISTWIDTH] IN BLOOD BY AUTOMATED COUNT: 12.9 % (ref 11.7–14.4)
GLUCOSE SERPL-MCNC: 84 MG/DL (ref 70–99)
HCT VFR BLD AUTO: 43.6 % (ref 35–45)
HDLC SERPL-MCNC: 70 MG/DL
HDLC SERPL: 2.3 {RATIO}
HGB BLD-MCNC: 14.1 G/DL (ref 11.8–15.7)
IMM GRANULOCYTES # BLD AUTO: 0.01 K/UL (ref 0–0.08)
IMM GRANULOCYTES NFR BLD AUTO: 0.2 %
LDLC SERPL CALC-MCNC: 73 MG/DL
LYMPHOCYTES # BLD: 2.5 K/UL (ref 1.2–3.5)
LYMPHOCYTES NFR BLD: 37.9 %
MCH RBC QN AUTO: 32.8 PG (ref 28–33.2)
MCHC RBC AUTO-ENTMCNC: 32.3 G/DL (ref 32.2–35.5)
MCV RBC AUTO: 101.4 FL (ref 83–98)
MONOCYTES # BLD: 0.61 K/UL (ref 0.28–0.8)
MONOCYTES NFR BLD: 9.2 %
NEUTROPHILS # BLD: 3.12 K/UL (ref 1.7–7)
NEUTS SEG NFR BLD: 47.2 %
NONHDLC SERPL-MCNC: 88 MG/DL
NRBC BLD-RTO: 0 %
PLATELET # BLD AUTO: 371 K/UL (ref 150–369)
PMV BLD AUTO: 12.1 FL (ref 9.4–12.3)
POTASSIUM SERPL-SCNC: 3.9 MEQ/L (ref 3.5–5.1)
PROT SERPL-MCNC: 6.6 G/DL (ref 6–8.2)
RBC # BLD AUTO: 4.3 M/UL (ref 3.93–5.22)
SODIUM SERPL-SCNC: 137 MEQ/L (ref 136–145)
TRIGL SERPL-MCNC: 76 MG/DL
TSH SERPL DL<=0.05 MIU/L-ACNC: 2.72 MIU/L (ref 0.34–5.6)
WBC # BLD AUTO: 6.6 K/UL (ref 3.8–10.5)

## 2025-07-01 PROCEDURE — 82306 VITAMIN D 25 HYDROXY: CPT

## 2025-07-01 PROCEDURE — 85025 COMPLETE CBC W/AUTO DIFF WBC: CPT

## 2025-07-01 PROCEDURE — 80053 COMPREHEN METABOLIC PANEL: CPT

## 2025-07-01 PROCEDURE — 84443 ASSAY THYROID STIM HORMONE: CPT

## 2025-07-01 PROCEDURE — 36415 COLL VENOUS BLD VENIPUNCTURE: CPT

## 2025-07-01 PROCEDURE — 80061 LIPID PANEL: CPT

## 2025-07-10 ENCOUNTER — TELEPHONE (OUTPATIENT)
Dept: INTERNAL MEDICINE | Facility: CLINIC | Age: 60
End: 2025-07-10

## 2025-07-10 ENCOUNTER — OFFICE VISIT (OUTPATIENT)
Dept: INTERNAL MEDICINE | Facility: CLINIC | Age: 60
End: 2025-07-10
Payer: MEDICARE

## 2025-07-10 VITALS
WEIGHT: 94 LBS | BODY MASS INDEX: 18.95 KG/M2 | TEMPERATURE: 97.3 F | DIASTOLIC BLOOD PRESSURE: 88 MMHG | OXYGEN SATURATION: 99 % | HEIGHT: 59 IN | HEART RATE: 83 BPM | SYSTOLIC BLOOD PRESSURE: 138 MMHG

## 2025-07-10 DIAGNOSIS — K21.9 GASTROESOPHAGEAL REFLUX DISEASE WITHOUT ESOPHAGITIS: ICD-10-CM

## 2025-07-10 DIAGNOSIS — R03.0 ELEVATED BLOOD PRESSURE READING: ICD-10-CM

## 2025-07-10 DIAGNOSIS — Z00.00 MEDICARE ANNUAL WELLNESS VISIT, SUBSEQUENT: Primary | ICD-10-CM

## 2025-07-10 DIAGNOSIS — M81.0 OSTEOPOROSIS WITHOUT CURRENT PATHOLOGICAL FRACTURE, UNSPECIFIED OSTEOPOROSIS TYPE: ICD-10-CM

## 2025-07-10 DIAGNOSIS — E78.2 MIXED HYPERLIPIDEMIA: ICD-10-CM

## 2025-07-10 PROCEDURE — G0439 PPPS, SUBSEQ VISIT: HCPCS | Performed by: INTERNAL MEDICINE

## 2025-07-10 RX ORDER — ROSUVASTATIN CALCIUM 5 MG/1
5 TABLET, COATED ORAL DAILY
Qty: 90 TABLET | Refills: 1 | Status: SHIPPED | OUTPATIENT
Start: 2025-07-10 | End: 2025-10-08

## 2025-07-10 ASSESSMENT — VISUAL ACUITY
OD_CC: 20/25
OS_CC: 20/25

## 2025-07-10 ASSESSMENT — PATIENT HEALTH QUESTIONNAIRE - PHQ9: SUM OF ALL RESPONSES TO PHQ9 QUESTIONS 1 & 2: 0

## 2025-07-10 NOTE — PATIENT INSTRUCTIONS
Your Personalized Prevention Plan Services (PPPS)    Preventive Services Checklist (Assumes Average Risk Unless Otherwise Noted):    Health Maintenance Topics with due status: Overdue       Topic Date Due    Depression Screening Never done    HIV Screening Never done    Hepatitis B Vaccines Never done    Cervical Cancer Screening Never done    Zoster Vaccine Never done    Pneumococcal (50 years of age and older) Never done    Medicare Annual Wellness Visit 10/22/2022    COVID-19 Vaccine 09/01/2024     Health Maintenance Topics with due status: Not Due       Topic Last Completion Date    DTaP, Tdap, and Td Vaccines 10/12/2015    Colorectal Cancer Screening 02/05/2021    Influenza Vaccine 10/15/2024    Breast Cancer Screening 01/23/2025    RSV Vaccine Not Due     Health Maintenance Topics with due status: Completed       Topic Last Completion Date    Hepatitis C Screening 12/18/2017     Health Maintenance Topics with due status: Aged Out       Topic Date Due    Meningococcal ACWY Aged Out    RSV <20 months Aged Out    HIB Vaccines Aged Out    IPV Vaccines Aged Out    Meningococcal B Aged Out    HPV Vaccines Aged Out       You May Be Eligible for These Additional Preventive Services   (Assumes Average Risk Unless Otherwise Noted)  Diabetes Screening Any 1 risk factor: hypertension, dyslipidemia, obesity, high glucose; or Any 2 risk factors: >=66yo, overweight, family history diabetes (covered every 6 months)   Hepatitis C Screening Any 1 risk factor: 1) blood transfusion before 1992,   2) current or past injection drug use (annually for high risk; if born between 6225-7690, see above for status).   Vaccine: Hepatitis B As necessary if at-risk: hemophilia, ESRD, diabetes, living with individual infected with hep B, healthcare worker with frequent contact with blood/bodily fluids (series covered once)   Sexually Transmitted Diseases (STDs) As necessary chlamydia, gonorrhea, syphilis, hepatitis B  (covered annually)  HIV if any 1 risk factor present: 1) <14yo or >64yo and at increased risk or 2) 15-64yo and ask for it (covered annually)   Lung Cancer Screening Low dose chest CT if all three risk factors: 1) 50-76yo, 2) smoker or quit within last 15y, 3) >=20 pack years (covered annually).  No results found for this or any previous visit.       Cholesterol Screening Both risk factors: 1) >=21yo and 2)  increased risk coronary artery disease (covered every 5 years).   Breast Cancer Screening Covered once 35-40yo, annually >=39yo (if >=51yo, see above for status).       Health Risk Factors with Personalized Education:  ----------------------------------------------------------------------------------------------------------------------  Controlling Your Blood Pressure  Maintain a normal weight (body mass index between 18.5 and 24.9).  Eat more fruit, vegetables and low-fat dairy.  Eat less saturated fat and total fat.  Lower your sodium (salt) intake.  Try to stay under 1500 mg per day, but if you cannot get your intake to be that low, at least lower it by 1000 mg.  Stay active.  Try to get at least 90 to 150 minutes of exercise per week.  Try brisk walking, swimming, bicycling or dancing.  Limit alcohol intake.  When you do consume alcohol, drink no more than 1 drink per day.  If you have been prescribed medication, take it regularly and exactly as prescribed.  Let your PCP know if you have any problems or questions about your medication.  Check your blood pressure at home or at the store.  Write down your readings and share them with your PCP  ----------------------------------------------------------------------------------------------------------------------  Controlling Your Cholesterol  Reduce the amount of saturated and trans fat in your diet.  Limit intake of red meat.  Consume only low-fat or non-fat/skim dairy.  Limit fried food.  Cook with vegetable oils.  Reduce your intake of sugary foods, sugary  drinks and alcohol.  Eat a diet high in fruit, vegetables and whole grains.  Get protein from fish, poultry and a small portion of nuts.  Stay active.  Try to get at least 90 to 150 minutes of exercise per week.  Try brisk walking, swimming, bicycling or dancing.  Maintain a healthy weight by balancing your diet and exercise.  If you have been prescribed medication, take it regularly and exactly as prescribed. Let your PCP know if you have any problems or questions about your medication.  It’s important to know your cholesterol numbers.  When recommended by your PCP, get the cholesterol blood test.  ---------------------------------------------------------------------------------------------------------------------   Controlling Your Osteoporosis, Strengthening Your Bones  Try to get at least 90 to 150 minutes of weight-bearing exercise per week.  Ensure intake of at least 1200mg of calcium per day.  Eat foods high in calcium like milk and other dairy, green vegetables, fruit, canned fish with soft and edible bones, nuts, calcium-set tofu.  Some foods are calcium-fortified, like bread, cereal, fruit juices and mineral water.  Help your body make vitamin D by getting 10-15 minutes per day of sunlight.    Ensure intake of at least 600IU of vitamin D per day.  Eat foods high in vitamin D like oily fish (salmon, sardines, mackerel) and eggs.  Some foods are fortified with vitamin D, like dairy and cereals.  Avoid high amounts of caffeine and salt, since they can cause the body to loose calcium.  Limit alcohol intake, since it is associated with weaker bones and is associated with falls and fractures.  Limit intake of fizzy drinks.  If you have been prescribed medication, take it regularly and exactly as prescribed.  Let your PCP know if you have any problems or questions about your medication          PLAN      Get Tdap     Prevnar 20   Flu vaccine end of oct  Colonoscopy after at age 60  Cont the meds  Watch the  BP

## 2025-07-10 NOTE — PROGRESS NOTES
Luis Eduardo Wynne is a 59 y.o. female who presents for a subsequent annual wellness visit.     Patient Care Team:  Maya Tesfaye MD as PCP - General  Dr Tristin Kyle        Here for wellness exam and discuss   Health maintenance  Current on mammogram  Current on DEXA scan  Colonoscopy due in 2/2026    Still grieving as her mother passed away few months ago  Dealing with selling her house and has been very stressed  Is getting SI joint surgery by Dr Castellon in November  Follows with Dr Crow and is on prolia injections  Follows with Dr Arnett for cervical radiculopathy/stenosis              Comprehensive Medical and Social History  Patient Active Problem List   Diagnosis    Cricopharyngeal dysphagia    Hyperglycemia    Vitamin D deficiency    Medicare annual wellness visit, subsequent    Osteoporosis without current pathological fracture    Elevated blood pressure reading in office without diagnosis of hypertension    Cerebral cavernoma    Otalgia of left ear    Acute sinusitis    Hyperlipidemia    Cervical myelopathy (CMS/HCC)    Allergy to shellfish    History of URI (upper respiratory infection)    Pain in both knees    Left foot pain    Allergy history, drug    Cervicogenic headache    Chronic pain syndrome    Chronic post-traumatic headache    Dizziness and giddiness    Food allergy    Hoarseness    Nephrolithiasis    Leiomyoma of uterus    Neck pain    Papanicolaou smear of cervix with low grade squamous intraepithelial lesion (LGSIL)    Spinal cord disorder (CMS/HCC)    Mouth sores    Family history of atherosclerosis    Pain of toe of right foot    Nasal congestion    Ear congestion    Sinusitis    Muscle cramps    Hypercalcemia    GERD (gastroesophageal reflux disease)    Pharyngoesophageal dysphagia    Right shoulder pain    Elevated platelet count    Elevated blood pressure reading     Past Medical History:   Diagnosis Date    Allergic rhinitis      Cerebral cavernoma     parietal cavernoma    Cervical myelopathy (CMS/HCC)     Cervical radiculopathy     Cervicogenic headache     Closed fracture of sesamoid bone of foot     Cricopharyngeal dysphagia     Fibroids     Food allergy     Fractured toe     GERD (gastroesophageal reflux disease)     Lipid disorder     Nephrolithiasis     Osteoporosis      Past Surgical History   Procedure Laterality Date    Anterior fusion cervical spine      c6-7    Bunionectomy      Cricopharyngeal myotomy      DIAGNOSTIC UPPER GASTROINTESTINAL ENDOSCOPY WITH COLLECTION OF SPECIMEN BY WASHING N/A 2/5/2021    [facility]formed by [facility]az [place] [facility]    FLEXIBLE COLONOSCOPY PROXIMAL TO SPLENIC FLEXURE WITH REMOVAL OF TUMOR USING SNARE N/A 2/5/2021    [facility]formed by [facility]az [place] [facility]    Knee surgery      Lumbar fusion      L5-S1    Myomectomy      Throat surgery      Tibial sesamoid excision       Allergies   Allergen Reactions    Liriano Flavor Other (see comments)    Shellfish Containing Products Anaphylaxis     Pumpkin seeds    Adhesive      Steri strips    Albert Other (see comments)    Banana Other (see comments)     also allergic to almonds, berries    Erythromycin     Erythromycin Base Other (see comments)     GI Upset  Other reaction(s): GI side effects    Iodine Other (see comments)     shell fish allergy    Latex Other (see comments)     patient not sure    Levonorgestrel-Ethinyl Estrad     Naprelan     Naproxen Other (see comments)     dysphagia throat burning    Naproxen Sodium      Other reaction(s): lump in throat    Penicillin G     Penicillin V Potassium Hives    Penicillins Hives    Pineapple Other (see comments)    Pumpkin Seed      Other reaction(s): throat gets tight    Shellfish Derived Hives     Current Outpatient Medications   Medication Sig Dispense Refill    cholecalciferol, vitamin D3, 25 mcg (1,000 unit) capsule Take 1,000 Units by mouth daily.      denosumab (PROLIA) 60 mg/mL syringe  "Inject 60 mg under the skin once.      folic acid/multivit,iron, (CENTRUM ORAL) Take 1 tablet by mouth daily.      gabapentin (NEURONTIN) 300 mg capsule Take 900 mg by mouth 3 (three) times a day.        hydrochlorothiazide (HYDRODIURIL) 25 mg tablet TAKE 1 TABLET DAILY 90 tablet 3    meloxicam (MOBIC) 7.5 mg tablet Take 7.5 mg by mouth as needed for pain.      pantoprazole (PROTONIX) 40 mg EC tablet TAKE 1 TABLET DAILY 90 tablet 3    rosuvastatin (CRESTOR) 5 mg tablet Take 1 tablet (5 mg total) by mouth daily. 90 tablet 1    HYDROcodone-acetaminophen (NORCO) 5-325 mg per tablet Take 1 tablet by mouth every 6 (six) hours as needed for severe pain for up to 10 doses. (Patient not taking: Reported on 7/10/2025) 10 tablet 0    ondansetron ODT (ZOFRAN-ODT) 4 mg disintegrating tablet Take 1 tablet (4 mg total) by mouth every 8 (eight) hours as needed for nausea for up to 12 doses. (Patient not taking: Reported on 7/10/2025) 12 tablet 0    potassium chloride (KLOR-CON M) 10 mEq CR tablet TAKE 1 TABLET DAILY 90 tablet 1     No current facility-administered medications for this visit.     Social History     Tobacco Use    Smoking status: Never    Smokeless tobacco: Never   Vaping Use    Vaping status: Never Used   Substance Use Topics    Alcohol use: No    Drug use: No     Family History   Problem Relation Name Age of Onset    Hyperlipidemia Biological Mother      Hypertension Biological Mother      Pulmonary fibrosis Biological Father      Heart attack Biological Father      Colon cancer Maternal Grandfather      Breast cancer Mother's Sister      Ovarian cancer Father's Sister         Objective   Vitals  Vitals:    07/10/25 1508 07/10/25 1548   BP: (!) 140/90 138/88   BP Location: Left upper arm    Patient Position: Sitting    Pulse: 83    Temp: 36.3 °C (97.3 °F)    TempSrc: Temporal    SpO2: 99%    Weight: 42.6 kg (94 lb)    Height: 1.499 m (4' 11\")      Body mass index is 18.99 kg/m².    Advanced Care Plan  Does " patient have advance directive?: No                                     PHQ  Will the patient answer the depression questions?: Yes   Little interest or pleasure in doing things: Not at all   Feeling down, depressed, or hopeless: Not at all   Depression Risk: 0                                             Mini Cog  Completed:  (deferred)      Get Up and Go  Result: Pass    STEADI Falls Risk  One or more falls in the last year: No           Has trouble stepping up onto a curb: No   Advised to use a cane or walker to get around safely: No   Often has to rush to the toilet: No   Feels unsteady when walking: No   Has lost some feeling in feet: No   Often feels sad or depressed: No   Steadies self on furniture while walking at home: No   Takes medication that makes him/her feel lightheaded or more tired than usual: No   Worried about falling: No   Takes medicine to sleep or improve mood: No   Needs to push with hands when rising from a chair: No   Falls screen completed: Yes     Hearing and Vision Screening  Vision Screening    Right eye Left eye Both eyes   Without correction      With correction 20/25 20/25 20/20   Hearing Screening - Comments:: Hearing is normal  See HRA for relevant hearing screening response.    Diet and Exercise  Do you exercise regularly?: Yes   Do you feel that your diet is healthy?: Yes     Assessment/Plan   Diagnoses and all orders for this visit:    Medicare annual wellness visit, subsequent (Primary)  Assessment & Plan:  Physical exam done  Health maintenance measures reviewed  Labs reviewed in detail   she is current on GYN exam    current on mammogram  Colonoscopy is current in 2021 and will be due in 2026  Current on DEXA scan  Current on Flu,COVID vaccine'  Due for Tdap and will get it at the pharmacy  Discussed shingles vaccine  Eye and dental exams advised  Discussed healthy diet and regular exercise  Safety precautions reviewed          Mixed hyperlipidemia  Assessment &  Plan:  Controlled  Reviewed labs and LDL is 73  Continue rosuvastatin 5 mg daily      Osteoporosis without current pathological fracture, unspecified osteoporosis type  Assessment & Plan:  Fair control  She is on prolia every 6 months and sees dr Crow  Discussed calcium and vitamin D supplementation      Gastroesophageal reflux disease without esophagitis  Assessment & Plan:  Stable  She uses pantoprazole 40 mg as needed      Elevated blood pressure reading  Assessment & Plan:  BP was elevated initially but improved on repeat check  She is on HCTZ for history of kidney stones  Cont to monitor BP      Other orders  -     rosuvastatin (CRESTOR) 5 mg tablet; Take 1 tablet (5 mg total) by mouth daily.        See Patient Instructions (the written plan) which was given to the patient for PPPS and health risk factors with interventions.

## 2025-07-11 NOTE — ASSESSMENT & PLAN NOTE
Physical exam done  Health maintenance measures reviewed  Labs reviewed in detail   she is current on GYN exam    current on mammogram  Colonoscopy is current in 2021 and will be due in 2026  Current on DEXA scan  Current on Flu,COVID vaccine'  Due for Tdap and will get it at the pharmacy  Discussed shingles vaccine  Eye and dental exams advised  Discussed healthy diet and regular exercise  Safety precautions reviewed

## 2025-07-11 NOTE — ASSESSMENT & PLAN NOTE
BP was elevated initially but improved on repeat check  She is on HCTZ for history of kidney stones  Cont to monitor BP

## 2025-07-14 RX ORDER — ROSUVASTATIN CALCIUM 5 MG/1
5 TABLET, COATED ORAL DAILY
Qty: 90 TABLET | Refills: 3 | Status: SHIPPED | OUTPATIENT
Start: 2025-07-14

## 2025-07-30 RX ORDER — POTASSIUM CHLORIDE 750 MG/1
10 TABLET, EXTENDED RELEASE ORAL DAILY
Qty: 90 TABLET | Refills: 3 | Status: SHIPPED | OUTPATIENT
Start: 2025-07-30

## (undated) DEVICE — SNARE HEXAGONAL MINI SOFT WIRE 7FR/240CM